# Patient Record
Sex: MALE | Race: OTHER | HISPANIC OR LATINO | Employment: FULL TIME | URBAN - METROPOLITAN AREA
[De-identification: names, ages, dates, MRNs, and addresses within clinical notes are randomized per-mention and may not be internally consistent; named-entity substitution may affect disease eponyms.]

---

## 2019-03-28 ENCOUNTER — EH NON-PROVIDER (OUTPATIENT)
Dept: OCCUPATIONAL MEDICINE | Facility: CLINIC | Age: 44
End: 2019-03-28

## 2019-03-28 ENCOUNTER — HOSPITAL ENCOUNTER (OUTPATIENT)
Facility: MEDICAL CENTER | Age: 44
End: 2019-03-28
Attending: FAMILY MEDICINE
Payer: COMMERCIAL

## 2019-03-28 ENCOUNTER — EMPLOYEE HEALTH (OUTPATIENT)
Dept: OCCUPATIONAL MEDICINE | Facility: CLINIC | Age: 44
End: 2019-03-28

## 2019-03-28 VITALS
SYSTOLIC BLOOD PRESSURE: 160 MMHG | OXYGEN SATURATION: 96 % | TEMPERATURE: 98.5 F | RESPIRATION RATE: 16 BRPM | BODY MASS INDEX: 34.07 KG/M2 | WEIGHT: 230 LBS | HEART RATE: 78 BPM | DIASTOLIC BLOOD PRESSURE: 96 MMHG | HEIGHT: 69 IN

## 2019-03-28 VITALS
DIASTOLIC BLOOD PRESSURE: 96 MMHG | RESPIRATION RATE: 16 BRPM | SYSTOLIC BLOOD PRESSURE: 160 MMHG | OXYGEN SATURATION: 96 % | HEART RATE: 78 BPM | BODY MASS INDEX: 34.07 KG/M2 | WEIGHT: 230 LBS | HEIGHT: 69 IN | TEMPERATURE: 98.5 F

## 2019-03-28 DIAGNOSIS — Z02.89 ENCOUNTER FOR OCCUPATIONAL HEALTH EXAMINATION: Primary | ICD-10-CM

## 2019-03-28 DIAGNOSIS — Z02.89 ENCOUNTER FOR OCCUPATIONAL HEALTH EXAMINATION: ICD-10-CM

## 2019-03-28 DIAGNOSIS — Z02.1 PRE-EMPLOYMENT DRUG SCREENING: ICD-10-CM

## 2019-03-28 LAB
AMP AMPHETAMINE: NORMAL
BAR BARBITURATES: NORMAL
BZO BENZODIAZEPINES: NORMAL
COC COCAINE: NORMAL
INT CON NEG: NORMAL
INT CON POS: NORMAL
MDMA ECSTASY: NORMAL
MET METHAMPHETAMINES: NORMAL
MTD METHADONE: NORMAL
OPI OPIATES: NORMAL
OXY OXYCODONE: NORMAL
PCP PHENCYCLIDINE: NORMAL
POC URINE DRUG SCREEN OCDRS: NEGATIVE
THC: NORMAL

## 2019-03-28 PROCEDURE — 8915 PR COMPREHENSIVE PHYSICAL: Performed by: FAMILY MEDICINE

## 2019-03-28 PROCEDURE — 90707 MMR VACCINE SC: CPT | Performed by: PREVENTIVE MEDICINE

## 2019-03-28 PROCEDURE — 80305 DRUG TEST PRSMV DIR OPT OBS: CPT | Performed by: FAMILY MEDICINE

## 2019-03-28 PROCEDURE — 86787 VARICELLA-ZOSTER ANTIBODY: CPT | Performed by: FAMILY MEDICINE

## 2019-03-28 PROCEDURE — 86480 TB TEST CELL IMMUN MEASURE: CPT | Performed by: FAMILY MEDICINE

## 2019-03-28 RX ORDER — ATORVASTATIN CALCIUM 40 MG/1
40 TABLET, FILM COATED ORAL NIGHTLY
COMMUNITY
End: 2019-07-11

## 2019-03-28 RX ORDER — LANSOPRAZOLE 30 MG/1
30 CAPSULE, DELAYED RELEASE ORAL DAILY
COMMUNITY
End: 2019-11-18 | Stop reason: SDUPTHER

## 2019-03-28 RX ORDER — FEXOFENADINE HCL 180 MG/1
180 TABLET ORAL DAILY
COMMUNITY

## 2019-03-28 RX ORDER — CLOPIDOGREL BISULFATE 75 MG/1
75 TABLET ORAL DAILY
COMMUNITY
End: 2019-11-18 | Stop reason: SDUPTHER

## 2019-03-28 NOTE — PROGRESS NOTES
See scanned preemployment history and physical examination.  BP elevated today and patient with past medical history of coronary artery stent.  He notes that blood pressure has been stable until today and will recheck on his own to follow-up with primary care if remains elevated

## 2019-03-29 LAB — VZV IGG SER IA-ACNC: 0.45

## 2019-03-30 LAB
GAMMA INTERFERON BACKGROUND BLD IA-ACNC: 0.08 IU/ML
M TB IFN-G BLD-IMP: NEGATIVE
M TB IFN-G CD4+ BCKGRND COR BLD-ACNC: -0.02 IU/ML
MITOGEN IGNF BCKGRD COR BLD-ACNC: >10 IU/ML
QFT TB2 - NIL TBQ2: -0.04 IU/ML

## 2019-04-08 ENCOUNTER — TELEPHONE (OUTPATIENT)
Dept: OCCUPATIONAL MEDICINE | Facility: CLINIC | Age: 44
End: 2019-04-08

## 2019-04-08 NOTE — TELEPHONE ENCOUNTER
Pt called to inform me that he will be receiving his first varicella vaccination on 4/9/19 in [place]. I provided the pt with my e-mail address in order to send me his proof of vaccination. Once I have received that, I will call to schedule his VZV #2 and clear him for work at [employer].

## 2019-04-17 ENCOUNTER — EH NON-PROVIDER (OUTPATIENT)
Dept: OCCUPATIONAL MEDICINE | Facility: CLINIC | Age: 44
End: 2019-04-17
Payer: COMMERCIAL

## 2019-04-17 DIAGNOSIS — Z71.85 IMMUNIZATION COUNSELING: ICD-10-CM

## 2019-04-17 NOTE — PROGRESS NOTES
Pre-employment medical surveillance reviewed and signed. Varicella vaccine series required. VZV #1 administered on 4/12/19. VZV #2 scheduled for 5/10/19. Quantiferon result documented in immunizations. Document returned to monthly assigned MA.

## 2019-05-10 ENCOUNTER — EH NON-PROVIDER (OUTPATIENT)
Dept: OCCUPATIONAL MEDICINE | Facility: CLINIC | Age: 44
End: 2019-05-10

## 2019-05-10 DIAGNOSIS — Z23 NEED FOR VARICELLA VACCINE: ICD-10-CM

## 2019-05-10 PROCEDURE — 90716 VAR VACCINE LIVE SUBQ: CPT | Performed by: PREVENTIVE MEDICINE

## 2019-05-16 ENCOUNTER — TELEPHONE (OUTPATIENT)
Dept: SCHEDULING | Facility: IMAGING CENTER | Age: 44
End: 2019-05-16

## 2019-06-25 ENCOUNTER — TELEPHONE (OUTPATIENT)
Dept: CARDIOLOGY | Facility: MEDICAL CENTER | Age: 44
End: 2019-06-25

## 2019-06-27 ENCOUNTER — TELEPHONE (OUTPATIENT)
Dept: CARDIOLOGY | Facility: MEDICAL CENTER | Age: 44
End: 2019-06-27

## 2019-06-27 NOTE — TELEPHONE ENCOUNTER
Received MR's from [facility] Records   Phone: [phone]  Fax: [phone]    Sent Records regional MR's to get scanned into chart.   Before apt. On 7/11/2019 with Dr. Power cardiologist

## 2019-07-06 ENCOUNTER — HOSPITAL ENCOUNTER (OUTPATIENT)
Dept: LAB | Facility: MEDICAL CENTER | Age: 44
End: 2019-07-06
Payer: COMMERCIAL

## 2019-07-06 LAB
BDY FAT % MEASURED: 25.4 %
BP DIAS: 71 MMHG
BP SYS: 142 MMHG
CHOLEST SERPL-MCNC: 140 MG/DL (ref 100–199)
DIABETES HTDIA: NO
EVENT NAME HTEVT: NORMAL
FASTING HTFAS: YES
GLUCOSE SERPL-MCNC: 117 MG/DL (ref 65–99)
HDLC SERPL-MCNC: 35 MG/DL
HYPERTENSION HTHYP: YES
LDLC SERPL CALC-MCNC: 83 MG/DL
SCREENING LOC CITY HTCIT: NORMAL
SCREENING LOC STATE HTSTA: NORMAL
SCREENING LOCATION HTLOC: NORMAL
SMOKING HTSMO: NO
SUBSCRIBER ID HTSID: NORMAL
TRIGL SERPL-MCNC: 108 MG/DL (ref 0–149)

## 2019-07-06 PROCEDURE — 36415 COLL VENOUS BLD VENIPUNCTURE: CPT

## 2019-07-06 PROCEDURE — 82947 ASSAY GLUCOSE BLOOD QUANT: CPT

## 2019-07-06 PROCEDURE — 80061 LIPID PANEL: CPT

## 2019-07-06 PROCEDURE — S5190 WELLNESS ASSESSMENT BY NONPH: HCPCS

## 2019-07-11 ENCOUNTER — OFFICE VISIT (OUTPATIENT)
Dept: CARDIOLOGY | Facility: MEDICAL CENTER | Age: 44
End: 2019-07-11
Payer: COMMERCIAL

## 2019-07-11 VITALS
HEART RATE: 76 BPM | SYSTOLIC BLOOD PRESSURE: 148 MMHG | WEIGHT: 235 LBS | HEIGHT: 69 IN | DIASTOLIC BLOOD PRESSURE: 84 MMHG | BODY MASS INDEX: 34.8 KG/M2 | OXYGEN SATURATION: 95 %

## 2019-07-11 DIAGNOSIS — I25.10 CORONARY ARTERY DISEASE INVOLVING NATIVE CORONARY ARTERY OF NATIVE HEART WITHOUT ANGINA PECTORIS: ICD-10-CM

## 2019-07-11 DIAGNOSIS — E78.00 PURE HYPERCHOLESTEROLEMIA: ICD-10-CM

## 2019-07-11 DIAGNOSIS — R03.0 PRE-HYPERTENSION: ICD-10-CM

## 2019-07-11 DIAGNOSIS — E78.6 LOW HDL (UNDER 40): ICD-10-CM

## 2019-07-11 PROCEDURE — 99204 OFFICE O/P NEW MOD 45 MIN: CPT | Performed by: INTERNAL MEDICINE

## 2019-07-11 RX ORDER — ATORVASTATIN CALCIUM 80 MG/1
80 TABLET, FILM COATED ORAL EVERY EVENING
Qty: 90 TAB | Refills: 3 | Status: SHIPPED | OUTPATIENT
Start: 2019-07-11 | End: 2019-11-18 | Stop reason: SDUPTHER

## 2019-07-11 ASSESSMENT — ENCOUNTER SYMPTOMS
FEVER: 0
CHILLS: 0
DIZZINESS: 1
PALPITATIONS: 0
BRUISES/BLEEDS EASILY: 0
DEPRESSION: 0
PND: 0
EYE DISCHARGE: 0
MYALGIAS: 0
COUGH: 0
HEADACHES: 0
HEARTBURN: 0
NERVOUS/ANXIOUS: 0
SHORTNESS OF BREATH: 0
NAUSEA: 0
BLURRED VISION: 0

## 2019-07-11 NOTE — PROGRESS NOTES
Chief Complaint   Patient presents with   • Coronary Artery Disease       Subjective:   Giuseppe Bhandari is a 43 y.o. male who presents today self-referred for follow-up of coronary artery    The patient had significant chest discomfort on before and on October 18, 2018.  He had mild elevation of troponins.  Coronary arteriography demonstrated a very tight stenosis of LAD that was treated with a 4.5 diameter drug-eluting stent.  There is minimal disease elsewhere.  Echocardiogram demonstrated a normal left ventricular systolic function with an LVEF of 60.  His pretreatment LDL was 131.  HDL tends to be 30.    His only complaint now is that he gets orthostatic lightheadedness and has a sense of soreness in the left parasternal region as mentioned by his cardiologist before he moved here.  He was in [place] at the time of the small non-ST elevation myocardial infarction.    He is doing heavy weightlifting.  He has no other specific complaints today.    Most recent blood pressure during health screening was 142 systolic.  Some home blood pressures in the last 6 months were discussed.  About a third of the blood pressures are greater than 130 systolic.  Most recent lipid panel demonstrates an LDL of 83 and a HDL of 30.  Triglycerides normal.    Family history: Father's history unknown.  Mother has recently diagnosed diabetes mellitus.  No coronary artery disease.    Social history: He has a girlfriend.  He is post divorce.  He has 3 daughters in town.  No tobacco ever.  No alcohol abuse.  No illicit drugs.    Records from [place] reviewed in detail.    His last EKG in December 2018 was normal.    History reviewed. No pertinent past medical history.  History reviewed. No pertinent surgical history.  History reviewed. No pertinent family history.  Social History     Social History   • Marital status: Single     Spouse name: N/A   • Number of children: N/A   • Years of education: N/A     Occupational History   • Not on  file.     Social History Main Topics   • Smoking status: Never Smoker   • Smokeless tobacco: Never Used   • Alcohol use Yes      Comment: occasional   • Drug use: No   • Sexual activity: Not on file     Other Topics Concern   • Not on file     Social History Narrative   • No narrative on file     Allergies   Allergen Reactions   • Iodine Rash and Itching     Pt. States he broke out from IV contrast    • Latex Swelling     Pt. States he also gets blisters     Outpatient Encounter Prescriptions as of 7/11/2019   Medication Sig Dispense Refill   • Multiple Vitamins-Minerals (MULTIVITAMIN ADULT PO) Take  by mouth.     • atorvastatin (LIPITOR) 80 MG tablet Take 1 Tab by mouth every evening. 90 Tab 3   • clopidogrel (PLAVIX) 75 MG Tab Take 75 mg by mouth every day.     • ADULT ASPIRIN LOW STRENGTH PO Take 81 mg by mouth.     • lansoprazole (PREVACID) 30 MG CAPSULE DELAYED RELEASE Take 30 mg by mouth every day.     • fexofenadine (ALLEGRA) 180 MG tablet Take 180 mg by mouth every day.     • [DISCONTINUED] atorvastatin (LIPITOR) 40 MG Tab Take 40 mg by mouth every evening.       No facility-administered encounter medications on file as of 7/11/2019.      Review of Systems   Constitutional: Negative for chills, fever and malaise/fatigue.   Eyes: Negative for blurred vision and discharge.   Respiratory: Negative for cough and shortness of breath.    Cardiovascular: Positive for chest pain. Negative for palpitations, leg swelling and PND.   Gastrointestinal: Negative for heartburn and nausea.   Genitourinary: Negative for dysuria and urgency.   Musculoskeletal: Negative for myalgias.   Skin: Negative for itching and rash.   Neurological: Positive for dizziness. Negative for headaches.   Endo/Heme/Allergies: Negative for environmental allergies. Does not bruise/bleed easily.   Psychiatric/Behavioral: Negative for depression. The patient is not nervous/anxious.         Objective:   /84 (BP Location: Left arm, Patient  "Position: Sitting, BP Cuff Size: Large adult)   Pulse 76   Ht 1.753 m (5' 9\")   Wt 106.6 kg (235 lb)   SpO2 95%   BMI 34.70 kg/m²     Physical Exam   Constitutional: He is oriented to person, place, and time.   Pleasant man    Neck: No JVD present.   Cardiovascular: Normal rate and regular rhythm.  Exam reveals no gallop and no friction rub.    No murmur heard.  Abdominal: Soft. There is no tenderness.   Musculoskeletal: He exhibits no edema.   Neurological: He is alert and oriented to person, place, and time.   Skin: Skin is warm and dry.       Assessment:     1. Coronary artery disease involving native coronary artery of native heart without angina pectoris     2. Pure hypercholesterolemia  Comp Metabolic Panel    LIPID PANEL    HEMOGLOBIN A1C (Glycohemoglobin GHB Total/A1C with MBG Estimate)   3. Low HDL (under 40)  Comp Metabolic Panel    LIPID PANEL    HEMOGLOBIN A1C (Glycohemoglobin GHB Total/A1C with MBG Estimate)   4. Pre-hypertension         Medical Decision Making:  Today's Assessment / Status / Plan:   Chronic complaint of orthostatic hypotension noted.  Recommend increase fluid intake.  Chronic complaint of left chest soreness noncardiac in etiology and may be related to his weight lifting program.  With respect to the possibility of hypertension, home blood pressures over the next month and return here with blood pressure record and home blood pressure device.  Increase Lipitor to 80 mg/day.  Lab work and return in 8 weeks.  Include glycohemoglobin because of a recent glucose of 117 and may have been fasting.  Follow-up with cardiologist in October November.  "

## 2019-08-16 ENCOUNTER — OFFICE VISIT (OUTPATIENT)
Dept: MEDICAL GROUP | Facility: MEDICAL CENTER | Age: 44
End: 2019-08-16
Payer: COMMERCIAL

## 2019-08-16 VITALS
SYSTOLIC BLOOD PRESSURE: 134 MMHG | WEIGHT: 238 LBS | OXYGEN SATURATION: 95 % | DIASTOLIC BLOOD PRESSURE: 78 MMHG | HEART RATE: 65 BPM | TEMPERATURE: 97.7 F | HEIGHT: 69 IN | BODY MASS INDEX: 35.25 KG/M2

## 2019-08-16 DIAGNOSIS — E78.00 PURE HYPERCHOLESTEROLEMIA: ICD-10-CM

## 2019-08-16 DIAGNOSIS — I25.10 CORONARY ARTERY DISEASE INVOLVING NATIVE CORONARY ARTERY OF NATIVE HEART WITHOUT ANGINA PECTORIS: ICD-10-CM

## 2019-08-16 DIAGNOSIS — E78.6 LOW HDL (UNDER 40): ICD-10-CM

## 2019-08-16 DIAGNOSIS — Z00.00 ANNUAL PHYSICAL EXAM: ICD-10-CM

## 2019-08-16 DIAGNOSIS — R73.01 ELEVATED FASTING BLOOD SUGAR: ICD-10-CM

## 2019-08-16 DIAGNOSIS — E66.9 OBESITY (BMI 30-39.9): ICD-10-CM

## 2019-08-16 DIAGNOSIS — K21.9 GASTROESOPHAGEAL REFLUX DISEASE, ESOPHAGITIS PRESENCE NOT SPECIFIED: ICD-10-CM

## 2019-08-16 PROCEDURE — 99386 PREV VISIT NEW AGE 40-64: CPT | Performed by: FAMILY MEDICINE

## 2019-08-16 SDOH — HEALTH STABILITY: MENTAL HEALTH: HOW MANY STANDARD DRINKS CONTAINING ALCOHOL DO YOU HAVE ON A TYPICAL DAY?: 1 OR 2

## 2019-08-16 SDOH — HEALTH STABILITY: MENTAL HEALTH: HOW OFTEN DO YOU HAVE 6 OR MORE DRINKS ON ONE OCCASION?: NEVER

## 2019-08-16 SDOH — HEALTH STABILITY: MENTAL HEALTH: HOW OFTEN DO YOU HAVE A DRINK CONTAINING ALCOHOL?: MONTHLY OR LESS

## 2019-08-16 ASSESSMENT — PATIENT HEALTH QUESTIONNAIRE - PHQ9: CLINICAL INTERPRETATION OF PHQ2 SCORE: 0

## 2019-08-16 NOTE — ASSESSMENT & PLAN NOTE
Diagnosed with GERD since age 19. Prevacid 30 mg daily is the only one that works well, has tried Nexium, Zantac, Prilosec without good results.

## 2019-08-16 NOTE — PROGRESS NOTES
Giuseppe Bhandari is a 43 y.o. male here for annual  HPI: Patient just moved from [place] and just  from his wife, going through divorce.    Patient had proximal LAD stent in October 2018, he was told that he had 99% stenosis at the time of stent. No known family history of heart disease but he does not know his father. He used to be morbidly obese, but has been exercising and eating healthier to lose weight. Being followed by cardiology and Atorvastatin was increased 1 month ago from 40 mg daily to now 80 mg daily.  He is also on Plavix and aspirin.    GERD (gastroesophageal reflux disease)  Diagnosed with GERD since age 19. Prevacid 30 mg daily is the only one that works well, has tried Nexium, Zantac, Prilosec without good results.    Current medicines (including changes today)  Current Outpatient Medications   Medication Sig Dispense Refill   • Multiple Vitamins-Minerals (MULTIVITAMIN ADULT PO) Take  by mouth.     • atorvastatin (LIPITOR) 80 MG tablet Take 1 Tab by mouth every evening. 90 Tab 3   • clopidogrel (PLAVIX) 75 MG Tab Take 75 mg by mouth every day.     • ADULT ASPIRIN LOW STRENGTH PO Take 81 mg by mouth.     • lansoprazole (PREVACID) 30 MG CAPSULE DELAYED RELEASE Take 30 mg by mouth every day.     • fexofenadine (ALLEGRA) 180 MG tablet Take 180 mg by mouth every day.       No current facility-administered medications for this visit.      He  has no past medical history on file.  He  has a past surgical history that includes lumbar fusion posterior (2004) and vasectomy (2007).  Social History     Tobacco Use   • Smoking status: Never Smoker   • Smokeless tobacco: Never Used   Substance Use Topics   • Alcohol use: Yes     Frequency: Monthly or less     Drinks per session: 1 or 2     Binge frequency: Never     Comment: occasional   • Drug use: No     Social History     Social History Narrative   • Not on file     Family History   Problem Relation Age of Onset   • Diabetes Mother      Family  "Status   Relation Name Status   • Mo  Alive   • Fa  Other        Unknown to patient   • Sis  Alive   • Bro     • Child  Alive   • Child  Alive   • Child  Alive   • Child  Alive   • Child  Alive         ROS  No chest pain.  All other systems reviewed and are negative     Objective:     /78 (BP Location: Right arm, Patient Position: Sitting)   Pulse 65   Temp 36.5 °C (97.7 °F)   Ht 1.753 m (5' 9\")   Wt 108 kg (238 lb)   SpO2 95%  Body mass index is 35.15 kg/m².  Physical Exam:    Constitutional: Alert, no distress.  Skin: Warm, dry, good turgor, no rashes in visible areas.  Eye: Equal, round and reactive, conjunctiva clear, lids normal.  ENMT: Lips without lesions, good dentition, oropharynx clear.  TMs pearly gray bilaterally.  Neck: Trachea midline, no masses, no thyromegaly. No cervical or supraclavicular lymphadenopathy.  Respiratory: Unlabored respiratory effort, lungs clear to auscultation, no wheezes, no ronchi.  Cardiovascular: Normal S1, S2, no murmur, no edema.  Abdomen: Soft, non-tender, no masses, no hepatosplenomegaly.  Psych: Alert and oriented x3, normal affect and mood.        Assessment and Plan:   The following treatment plan was discussed    1. Annual physical exam  Advised healthy lifestyle.  Check labs and call with results.  - Comp Metabolic Panel; Future  - HEMOGLOBIN A1C; Future  - CBC WITH DIFFERENTIAL; Future  - Lipid Profile; Future  - TSH WITH REFLEX TO FT4; Future    2. Coronary artery disease involving native coronary artery of native heart without angina pectoris  Strongly encouraged healthy lifestyle and to be compliant with medication.  Check labs and call with results.  Follow-up with cardiology as scheduled.    3. Low HDL (under 40)  Strongly encouraged healthy lifestyle and to be compliant with medication.  Check labs and call with results    4. Pure hypercholesterolemia  Strongly encouraged healthy lifestyle and to be compliant with medication.  Check labs and " call with results    5. Obesity (BMI 30-39.9)  - Patient identified as having weight management issue.  Appropriate orders and counseling given.    6. Elevated fasting blood sugar  Check A1c and call with results.    7. Gastroesophageal reflux disease, esophagitis presence not specified  Controlled.  Continue Prevacid.      Records requested.  Followup: Return in about 1 year (around 8/16/2020) for Annual.

## 2019-09-07 ENCOUNTER — HOSPITAL ENCOUNTER (OUTPATIENT)
Dept: LAB | Facility: MEDICAL CENTER | Age: 44
End: 2019-09-07
Attending: FAMILY MEDICINE
Payer: COMMERCIAL

## 2019-09-07 DIAGNOSIS — Z00.00 ANNUAL PHYSICAL EXAM: ICD-10-CM

## 2019-09-07 LAB
ALBUMIN SERPL BCP-MCNC: 4.6 G/DL (ref 3.2–4.9)
ALBUMIN/GLOB SERPL: 1.6 G/DL
ALP SERPL-CCNC: 74 U/L (ref 30–99)
ALT SERPL-CCNC: 49 U/L (ref 2–50)
ANION GAP SERPL CALC-SCNC: 11 MMOL/L (ref 0–11.9)
AST SERPL-CCNC: 38 U/L (ref 12–45)
BASOPHILS # BLD AUTO: 0.7 % (ref 0–1.8)
BASOPHILS # BLD: 0.06 K/UL (ref 0–0.12)
BILIRUB SERPL-MCNC: 0.8 MG/DL (ref 0.1–1.5)
BUN SERPL-MCNC: 12 MG/DL (ref 8–22)
CALCIUM SERPL-MCNC: 9.5 MG/DL (ref 8.5–10.5)
CHLORIDE SERPL-SCNC: 104 MMOL/L (ref 96–112)
CHOLEST SERPL-MCNC: 122 MG/DL (ref 100–199)
CO2 SERPL-SCNC: 26 MMOL/L (ref 20–33)
CREAT SERPL-MCNC: 0.98 MG/DL (ref 0.5–1.4)
EOSINOPHIL # BLD AUTO: 0.07 K/UL (ref 0–0.51)
EOSINOPHIL NFR BLD: 0.8 % (ref 0–6.9)
ERYTHROCYTE [DISTWIDTH] IN BLOOD BY AUTOMATED COUNT: 40.9 FL (ref 35.9–50)
EST. AVERAGE GLUCOSE BLD GHB EST-MCNC: 128 MG/DL
GLOBULIN SER CALC-MCNC: 2.8 G/DL (ref 1.9–3.5)
GLUCOSE SERPL-MCNC: 108 MG/DL (ref 65–99)
HBA1C MFR BLD: 6.1 % (ref 0–5.6)
HCT VFR BLD AUTO: 48 % (ref 42–52)
HDLC SERPL-MCNC: 39 MG/DL
HGB BLD-MCNC: 15.4 G/DL (ref 14–18)
IMM GRANULOCYTES # BLD AUTO: 0.03 K/UL (ref 0–0.11)
IMM GRANULOCYTES NFR BLD AUTO: 0.3 % (ref 0–0.9)
LDLC SERPL CALC-MCNC: 60 MG/DL
LYMPHOCYTES # BLD AUTO: 1.8 K/UL (ref 1–4.8)
LYMPHOCYTES NFR BLD: 20.5 % (ref 22–41)
MCH RBC QN AUTO: 28.7 PG (ref 27–33)
MCHC RBC AUTO-ENTMCNC: 32.1 G/DL (ref 33.7–35.3)
MCV RBC AUTO: 89.6 FL (ref 81.4–97.8)
MONOCYTES # BLD AUTO: 0.64 K/UL (ref 0–0.85)
MONOCYTES NFR BLD AUTO: 7.3 % (ref 0–13.4)
NEUTROPHILS # BLD AUTO: 6.17 K/UL (ref 1.82–7.42)
NEUTROPHILS NFR BLD: 70.4 % (ref 44–72)
NRBC # BLD AUTO: 0 K/UL
NRBC BLD-RTO: 0 /100 WBC
PLATELET # BLD AUTO: 217 K/UL (ref 164–446)
PMV BLD AUTO: 10.5 FL (ref 9–12.9)
POTASSIUM SERPL-SCNC: 4.1 MMOL/L (ref 3.6–5.5)
PROT SERPL-MCNC: 7.4 G/DL (ref 6–8.2)
RBC # BLD AUTO: 5.36 M/UL (ref 4.7–6.1)
SODIUM SERPL-SCNC: 141 MMOL/L (ref 135–145)
T4 FREE SERPL-MCNC: 0.83 NG/DL (ref 0.53–1.43)
TRIGL SERPL-MCNC: 116 MG/DL (ref 0–149)
TSH SERPL DL<=0.005 MIU/L-ACNC: 5.54 UIU/ML (ref 0.38–5.33)
WBC # BLD AUTO: 8.8 K/UL (ref 4.8–10.8)

## 2019-09-07 PROCEDURE — 80053 COMPREHEN METABOLIC PANEL: CPT

## 2019-09-07 PROCEDURE — 36415 COLL VENOUS BLD VENIPUNCTURE: CPT

## 2019-09-07 PROCEDURE — 80061 LIPID PANEL: CPT

## 2019-09-07 PROCEDURE — 84443 ASSAY THYROID STIM HORMONE: CPT

## 2019-09-07 PROCEDURE — 83036 HEMOGLOBIN GLYCOSYLATED A1C: CPT

## 2019-09-07 PROCEDURE — 84439 ASSAY OF FREE THYROXINE: CPT

## 2019-09-07 PROCEDURE — 85025 COMPLETE CBC W/AUTO DIFF WBC: CPT

## 2019-09-09 ENCOUNTER — TELEPHONE (OUTPATIENT)
Dept: MEDICAL GROUP | Facility: MEDICAL CENTER | Age: 44
End: 2019-09-09

## 2019-09-09 NOTE — TELEPHONE ENCOUNTER
----- Message from Mathieu Mccracken M.D. sent at 9/9/2019  7:33 AM PDT -----  Please have patient schedule appointment to discuss recent lab results. Not urgent or critical.  He is prediabetic and his thyroid function is borderline low functioning.  Mathieu Mccracken MD

## 2019-09-11 ENCOUNTER — OFFICE VISIT (OUTPATIENT)
Dept: MEDICAL GROUP | Facility: MEDICAL CENTER | Age: 44
End: 2019-09-11
Payer: COMMERCIAL

## 2019-09-11 VITALS
HEART RATE: 66 BPM | BODY MASS INDEX: 35.25 KG/M2 | WEIGHT: 238 LBS | OXYGEN SATURATION: 93 % | TEMPERATURE: 97.7 F | SYSTOLIC BLOOD PRESSURE: 134 MMHG | DIASTOLIC BLOOD PRESSURE: 80 MMHG | HEIGHT: 69 IN

## 2019-09-11 DIAGNOSIS — R73.03 PREDIABETES: ICD-10-CM

## 2019-09-11 DIAGNOSIS — E03.8 SUBCLINICAL HYPOTHYROIDISM: ICD-10-CM

## 2019-09-11 DIAGNOSIS — E78.00 PURE HYPERCHOLESTEROLEMIA: ICD-10-CM

## 2019-09-11 PROCEDURE — 99214 OFFICE O/P EST MOD 30 MIN: CPT | Performed by: FAMILY MEDICINE

## 2019-09-11 NOTE — ASSESSMENT & PLAN NOTE
Has never had thyroid problems in the past with previous test. Was doing a lot of working out, decreased food intake and energy drinks 1-2 weeks before labs.  No hypothyroid symptoms.    Results for SHELLY DUGAN (MRN [id number]) as of 9/11/2019 08:20   Ref. Range 9/7/2019 09:58   TSH Latest Ref Range: 0.380 - 5.330 uIU/mL 5.540 (H)   Free T-4 Latest Ref Range: 0.53 - 1.43 ng/dL 0.83

## 2019-09-11 NOTE — ASSESSMENT & PLAN NOTE
A1c of 6.1. He is exercising regularly. He has tried to increase vegetables.  Was eating out a lot because he did not have his house for the last 3 months, now eating much healthier.

## 2019-09-11 NOTE — PROGRESS NOTES
Subjective:   Giuseppe Dugan is a 43 y.o. male here today for prediabetes, hypercholesterolemia, abnormal thyroid labs.    Subclinical hypothyroidism  Has never had thyroid problems in the past with previous test. Was doing a lot of working out, decreased food intake and energy drinks 1-2 weeks before labs.  No hypothyroid symptoms.    Results for GIUSEPPE DUGAN (MRN [id number]) as of 9/11/2019 08:20   Ref. Range 9/7/2019 09:58   TSH Latest Ref Range: 0.380 - 5.330 uIU/mL 5.540 (H)   Free T-4 Latest Ref Range: 0.53 - 1.43 ng/dL 0.83       Prediabetes  A1c of 6.1. He is exercising regularly. He has tried to increase vegetables.  Was eating out a lot because he did not have his house for the last 3 months, now eating much healthier.    Pure hypercholesterolemia  Tolerating atorvastatin 80 mg daily, increased from 40 mg daily 2 months ago.    Results for GIUSEPPE DUGAN (MRN [id number]) as of 9/11/2019 08:20   Ref. Range 7/6/2019 07:39 9/7/2019 09:58   Cholesterol,Tot Latest Ref Range: 100 - 199 mg/dL 140 122   Triglycerides Latest Ref Range: 0 - 149 mg/dL 108 116   HDL Latest Ref Range: >=40 mg/dL 35 (A) 39 (A)   LDL Latest Ref Range: <100 mg/dL 83 60          Current medicines (including changes today)  Current Outpatient Medications   Medication Sig Dispense Refill   • Multiple Vitamins-Minerals (MULTIVITAMIN ADULT PO) Take  by mouth.     • atorvastatin (LIPITOR) 80 MG tablet Take 1 Tab by mouth every evening. 90 Tab 3   • clopidogrel (PLAVIX) 75 MG Tab Take 75 mg by mouth every day.     • ADULT ASPIRIN LOW STRENGTH PO Take 81 mg by mouth.     • lansoprazole (PREVACID) 30 MG CAPSULE DELAYED RELEASE Take 30 mg by mouth every day.     • fexofenadine (ALLEGRA) 180 MG tablet Take 180 mg by mouth every day.       No current facility-administered medications for this visit.      He  has no past medical history on file.    ROS   No chest pain, no shortness of breath       Objective:     /80 (BP Location:  "Right arm, Patient Position: Sitting)   Pulse 66   Temp 36.5 °C (97.7 °F)   Ht 1.753 m (5' 9\")   Wt 108 kg (238 lb)   SpO2 93%  Body mass index is 35.15 kg/m².   Physical Exam:  Constitutional: Alert, no distress.  Skin: Warm, dry, good turgor, no rashes in visible areas.  Eye: Equal, round and reactive, conjunctiva clear, lids normal.  Psych: Alert and oriented x3, normal affect and mood.        Assessment and Plan:   The following treatment plan was discussed    1. Subclinical hypothyroidism  Asymptomatic. Continue to monitor.    2. Prediabetes  Encourage patient continue on track with his healthy diet and regular exercise.  Follow up labs in 1 year for annual.    3. Pure hypercholesterolemia  Controlled LDL.  Continue exercising to try to improve HDL.  Continue atorvastatin 80 mg daily.      Followup: Return if symptoms worsen or fail to improve.         "

## 2019-09-11 NOTE — ASSESSMENT & PLAN NOTE
Tolerating atorvastatin 80 mg daily, increased from 40 mg daily 2 months ago.    Results for RITCHIE SHELLY STEELE (MRN [id number]) as of 9/11/2019 08:20   Ref. Range 7/6/2019 07:39 9/7/2019 09:58   Cholesterol,Tot Latest Ref Range: 100 - 199 mg/dL 140 122   Triglycerides Latest Ref Range: 0 - 149 mg/dL 108 116   HDL Latest Ref Range: >=40 mg/dL 35 (A) 39 (A)   LDL Latest Ref Range: <100 mg/dL 83 60

## 2019-09-19 ENCOUNTER — OFFICE VISIT (OUTPATIENT)
Dept: CARDIOLOGY | Facility: MEDICAL CENTER | Age: 44
End: 2019-09-19
Payer: COMMERCIAL

## 2019-09-19 VITALS
WEIGHT: 239 LBS | HEIGHT: 69 IN | DIASTOLIC BLOOD PRESSURE: 72 MMHG | BODY MASS INDEX: 35.4 KG/M2 | HEART RATE: 86 BPM | OXYGEN SATURATION: 96 % | SYSTOLIC BLOOD PRESSURE: 142 MMHG

## 2019-09-19 DIAGNOSIS — I20.89 STABLE ANGINA PECTORIS: ICD-10-CM

## 2019-09-19 DIAGNOSIS — I25.10 CORONARY ARTERY DISEASE INVOLVING NATIVE CORONARY ARTERY OF NATIVE HEART WITHOUT ANGINA PECTORIS: ICD-10-CM

## 2019-09-19 DIAGNOSIS — E78.6 LOW HDL (UNDER 40): ICD-10-CM

## 2019-09-19 DIAGNOSIS — E78.00 PURE HYPERCHOLESTEROLEMIA: ICD-10-CM

## 2019-09-19 DIAGNOSIS — R03.0 PRE-HYPERTENSION: ICD-10-CM

## 2019-09-19 PROCEDURE — 99214 OFFICE O/P EST MOD 30 MIN: CPT | Performed by: NURSE PRACTITIONER

## 2019-09-19 ASSESSMENT — ENCOUNTER SYMPTOMS
CHILLS: 0
DIZZINESS: 0
ORTHOPNEA: 0
HEADACHES: 0
COUGH: 0
SHORTNESS OF BREATH: 0
SPUTUM PRODUCTION: 0
NAUSEA: 0
PALPITATIONS: 0
FEVER: 0
HEMOPTYSIS: 0
PND: 0
CLAUDICATION: 0
WHEEZING: 0
VOMITING: 0

## 2019-09-19 NOTE — PROGRESS NOTES
No chief complaint on file.      Subjective:   Giuseppe Bhandari is a 44 y.o. male who presents today for CAD.  Patient had NSTEMI in which a 4.5 x 18 mm resolute Angelo GARRET was placed to the pLAD.  It was noted there was minimal disease elsewhere.  Echocardiogram showed normal LV function with EF 60%.  Patient's LDL was 131 and HDL 30 prior to treatment.    Recent labs indicate CBC relatively within normal limits, TSH 5.540, A1c 6.1, CMP within normal limits except for glucose of 108, and lipid panel indicates , , HDL 39, LDL 60.    Patient is compliant with atorvastatin 80 mg every evening, clopidogrel 75 mg daily, ASA 81 mg daily.    Patient reports blood pressures ranging from 117-140s/60s-80s.  He states he was not placed on a beta-blocker at the time of his stent placement due to lower blood pressures.    Patient reports having a sharp pain that comes on occasionally at his shoulder that goes down his left arm to his elbow and also radiates down his back to just below his scapula.  He lives weights on a regular basis and has had some massage therapy, which she states helps a little.  This pain is been going on for 1 year.  He is in the middle of a divorce and has a great deal of stress in his life currently.  Will obtain MRA of the chest to look for aortic dissection.      No past medical history on file.  Past Surgical History:   Procedure Laterality Date   • VASECTOMY  2007   • LUMBAR FUSION POSTERIOR  2004    L4-S1     Family History   Problem Relation Age of Onset   • Diabetes Mother      Social History     Socioeconomic History   • Marital status:      Spouse name: Not on file   • Number of children: Not on file   • Years of education: Not on file   • Highest education level: Not on file   Occupational History   • Not on file   Social Needs   • Financial resource strain: Not on file   • Food insecurity:     Worry: Not on file     Inability: Not on file   • Transportation needs:      Medical: Not on file     Non-medical: Not on file   Tobacco Use   • Smoking status: Never Smoker   • Smokeless tobacco: Never Used   Substance and Sexual Activity   • Alcohol use: Yes     Frequency: Monthly or less     Drinks per session: 1 or 2     Binge frequency: Never     Comment: occasional   • Drug use: No   • Sexual activity: Not Currently     Partners: Female   Lifestyle   • Physical activity:     Days per week: Not on file     Minutes per session: Not on file   • Stress: Not on file   Relationships   • Social connections:     Talks on phone: Not on file     Gets together: Not on file     Attends Synagogue service: Not on file     Active member of club or organization: Not on file     Attends meetings of clubs or organizations: Not on file     Relationship status: Not on file   • Intimate partner violence:     Fear of current or ex partner: Not on file     Emotionally abused: Not on file     Physically abused: Not on file     Forced sexual activity: Not on file   Other Topics Concern   • Not on file   Social History Narrative   • Not on file     Allergies   Allergen Reactions   • Iodine Rash and Itching     Pt. States he broke out from IV contrast    • Latex Swelling     Pt. States he also gets blisters   • Pineapple    • Shellfish Allergy      Outpatient Encounter Medications as of 9/19/2019   Medication Sig Dispense Refill   • Multiple Vitamins-Minerals (MULTIVITAMIN ADULT PO) Take  by mouth.     • atorvastatin (LIPITOR) 80 MG tablet Take 1 Tab by mouth every evening. 90 Tab 3   • clopidogrel (PLAVIX) 75 MG Tab Take 75 mg by mouth every day.     • ADULT ASPIRIN LOW STRENGTH PO Take 81 mg by mouth.     • lansoprazole (PREVACID) 30 MG CAPSULE DELAYED RELEASE Take 30 mg by mouth every day.     • fexofenadine (ALLEGRA) 180 MG tablet Take 180 mg by mouth every day.       No facility-administered encounter medications on file as of 9/19/2019.      Review of Systems   Constitutional: Negative for chills and  fever.   Respiratory: Negative for cough, hemoptysis, sputum production, shortness of breath and wheezing.    Cardiovascular: Negative for chest pain, palpitations, orthopnea, claudication, leg swelling and PND.   Gastrointestinal: Negative for nausea and vomiting.   Neurological: Negative for dizziness and headaches.   All other systems reviewed and are negative.       Objective:   There were no vitals taken for this visit.    Physical Exam   Constitutional: He appears well-developed and well-nourished.   Eyes: EOM are normal.   Neck: Neck supple. No JVD present.   Cardiovascular: Normal rate, regular rhythm and normal heart sounds.   No murmur heard.  Pulmonary/Chest: Effort normal and breath sounds normal.   Abdominal: Soft.   Neurological:   CN II-XII grossly intact   Skin: Skin is warm and dry.   Psychiatric: He has a normal mood and affect. His behavior is normal. Judgment and thought content normal.   Nursing note and vitals reviewed.      Assessment:     1. Coronary artery disease involving native coronary artery of native heart without angina pectoris     2. Low HDL (under 40)     3. Pure hypercholesterolemia     4. Pre-hypertension         Medical Decision Making:  Today's Assessment / Status / Plan:   1. CAD  -Continue ASA 81 mg daily, atorvastatin 80 mg every evening, clopidogrel 75 mg daily  - Patient having shoulder and left arm pain will obtain MRA to rule out dissection    2.  Low HDL  -Most recent HDL 39  -On max atorvastatin    3.  Pure hypercholesterolemia  -Continue ASA and atorvastatin    4.  Pre-hypertension  - Patient is to obtain blood pressure cuff and record BP and HR    Collaborating MD is Dr. Gee.  Follow-up visit with Dr. Gee.  In October as already scheduled    Please note that this dictation was created using voice recognition software.  I have made every reasonable attempt to correct obvious errors, but it is possible there are errors of grammar or possibly content that I  did not discover before finalizing the note.

## 2019-10-15 ENCOUNTER — HOSPITAL ENCOUNTER (OUTPATIENT)
Dept: RADIOLOGY | Facility: MEDICAL CENTER | Age: 44
End: 2019-10-15
Attending: NURSE PRACTITIONER
Payer: COMMERCIAL

## 2019-10-15 DIAGNOSIS — I20.89 STABLE ANGINA PECTORIS: ICD-10-CM

## 2019-10-15 PROCEDURE — 700117 HCHG RX CONTRAST REV CODE 255: Performed by: NURSE PRACTITIONER

## 2019-10-15 PROCEDURE — A9576 INJ PROHANCE MULTIPACK: HCPCS | Performed by: NURSE PRACTITIONER

## 2019-10-15 PROCEDURE — C8911 MRA W/O FOL W/CONT, CHEST: HCPCS

## 2019-10-15 RX ADMIN — GADOTERIDOL 15 ML: 279.3 INJECTION, SOLUTION INTRAVENOUS at 15:33

## 2019-10-15 RX ADMIN — GADOTERIDOL 7 ML: 279.3 INJECTION, SOLUTION INTRAVENOUS at 15:34

## 2019-11-01 ENCOUNTER — TELEPHONE (OUTPATIENT)
Dept: CARDIOLOGY | Facility: MEDICAL CENTER | Age: 44
End: 2019-11-01

## 2019-11-01 NOTE — TELEPHONE ENCOUNTER
Called and left message on 11/01/2019 for patient to remind him that he has an upcoming appointment with Jovanna Gee on 11/18/2019 at 9:00AM and he needs to do his labs before his appointment.

## 2019-11-08 ENCOUNTER — NON-PROVIDER VISIT (OUTPATIENT)
Dept: URGENT CARE | Facility: PHYSICIAN GROUP | Age: 44
End: 2019-11-08

## 2019-11-08 DIAGNOSIS — Z23 FLU VACCINE NEED: Primary | ICD-10-CM

## 2019-11-08 PROCEDURE — 90686 IIV4 VACC NO PRSV 0.5 ML IM: CPT | Performed by: NURSE PRACTITIONER

## 2019-11-09 NOTE — NON-PROVIDER
"Giuseppe Bhandari is a 44 y.o. male here for a non-provider visit for:   FLU    Reason for immunization: Annual Flu Vaccine  Immunization records indicate need for vaccine: Yes, confirmed with Epic  Minimum interval has been met for this vaccine: Yes  ABN completed: Not Indicated    Order and dose verified by: Monica ROMERO Dated  8/15/19 was given to patient: Yes  All IAC Questionnaire questions were answered \"No.\"    Patient tolerated injection and no adverse effects were observed or reported: Yes    Pt scheduled for next dose in series: Not Indicated     "

## 2019-11-17 ENCOUNTER — PATIENT MESSAGE (OUTPATIENT)
Dept: MEDICAL GROUP | Facility: MEDICAL CENTER | Age: 44
End: 2019-11-17

## 2019-11-18 ENCOUNTER — OFFICE VISIT (OUTPATIENT)
Dept: CARDIOLOGY | Facility: MEDICAL CENTER | Age: 44
End: 2019-11-18
Payer: COMMERCIAL

## 2019-11-18 VITALS
HEART RATE: 70 BPM | DIASTOLIC BLOOD PRESSURE: 80 MMHG | OXYGEN SATURATION: 96 % | BODY MASS INDEX: 35.84 KG/M2 | SYSTOLIC BLOOD PRESSURE: 124 MMHG | WEIGHT: 241.96 LBS | HEIGHT: 69 IN

## 2019-11-18 DIAGNOSIS — I25.10 CORONARY ARTERY DISEASE INVOLVING NATIVE CORONARY ARTERY OF NATIVE HEART WITHOUT ANGINA PECTORIS: ICD-10-CM

## 2019-11-18 DIAGNOSIS — Z95.5 STENTED CORONARY ARTERY: ICD-10-CM

## 2019-11-18 DIAGNOSIS — E78.2 MIXED HYPERLIPIDEMIA: ICD-10-CM

## 2019-11-18 DIAGNOSIS — R07.89 OTHER CHEST PAIN: ICD-10-CM

## 2019-11-18 DIAGNOSIS — K21.9 GASTROESOPHAGEAL REFLUX DISEASE WITHOUT ESOPHAGITIS: ICD-10-CM

## 2019-11-18 DIAGNOSIS — R03.0 PRE-HYPERTENSION: ICD-10-CM

## 2019-11-18 DIAGNOSIS — R73.01 ELEVATED FASTING BLOOD SUGAR: ICD-10-CM

## 2019-11-18 DIAGNOSIS — R73.03 PREDIABETES: ICD-10-CM

## 2019-11-18 PROCEDURE — 99214 OFFICE O/P EST MOD 30 MIN: CPT | Performed by: INTERNAL MEDICINE

## 2019-11-18 RX ORDER — ATORVASTATIN CALCIUM 80 MG/1
80 TABLET, FILM COATED ORAL EVERY EVENING
Qty: 90 TAB | Refills: 3 | Status: SHIPPED | OUTPATIENT
Start: 2019-11-18 | End: 2020-08-21 | Stop reason: SDUPTHER

## 2019-11-18 RX ORDER — CLOPIDOGREL BISULFATE 75 MG/1
75 TABLET ORAL DAILY
Qty: 90 TAB | Refills: 3 | Status: SHIPPED | OUTPATIENT
Start: 2019-11-18 | End: 2019-11-18

## 2019-11-18 RX ORDER — LANSOPRAZOLE 30 MG/1
30 CAPSULE, DELAYED RELEASE ORAL DAILY
Qty: 90 CAP | Refills: 3 | Status: SHIPPED | OUTPATIENT
Start: 2019-11-18 | End: 2020-08-21 | Stop reason: SDUPTHER

## 2019-11-18 NOTE — PROGRESS NOTES
Subjective:   Chief Complaint:   Chief Complaint   Patient presents with   • Coronary Artery Disease       Giuseppe Bhandari is a 44 y.o. male who returns for CAD.  He was working out in the gym, had been having chest discomfort he thought was related to lifting weights, would get tired, lightheaded, fatigued, nauseous.     The month before his MI, he thought he had the flu. Was visiting here from [place].  His cardio was being limiting by sign shortness of breath. Was at the gym, struggling, thought he was going to dry home but somehow drove to the ED, he does not really recall making the decision to go to the ED, vaguely recalls being in the ED, they reported he was sweating heavily.    Had NSTEMI October 3, 2018 in [place], [facility] 95% prox LAD that was treated with a 4.5 x 18 mm drug-eluting stent.  There is minimal disease elsewhere.    Echocardiogram demonstrated a normal left ventricular systolic function with an LVEF of 60. Sounds like he may have had a Nuc or CT scan.  Had hives.  DAPT for one year.  His pretreatment LDL was 131.      Has HLP, on lipitor 80 mg, ldl 60, hdl low.  Has IFG.    Had orthostatic lightheadedness so no BB.    He is not limited by chest pain, pressure or tightness with activity.   He does get some resting CP at times which is different. Does not last long.  Reports stress from divorce.  Pain behind left shoulder blade, had MRA to r/o TAA because of friend's spouse who  from this.    Reports mild dyspnea on exertion with cardio exercise.  No orthopnea or lower extremity swelling.   No significant palpitations.  Has had lightheaded spells but not prolonged.  No  presyncope/syncope.   No symptoms of leg claudication.   No stroke/TIA like symptoms.    Fathers HX not known.  No family history of premature coronary artery disease on mother's side.  No prior smoking history.  No history of hypertension.  No history of autoimmune disease such as lupus or rheumatoid arthritis.  No chronic kidney  disease.    DATA REVIEWED by me:  ECG 10-2-18 [place]  Sinus, 98     Echo 10-3-18 [place]  EF 60%, normal wall motion    LHC 10-3-18 [place]  95% prox LAD, Resolute Angelo GARRET, 4.5x18 mm, LVEDP 15 mmHg    MRA chest 10-15-19  MRA of the thoracic aorta within normal limits. No aneurysmal dilatation or evidence of dissection.    Most recent labs:     9-7-19 h 15.4, p 217, ha 141, k 4.1, cr 0.98, lfts normal, a1c 6.1. ldl 60, hdl 39, tg 116, tc 122    History reviewed. No pertinent past medical history.  Past Surgical History:   Procedure Laterality Date   • VASECTOMY  2007   • LUMBAR FUSION POSTERIOR  2004    L4-S1     Family History   Problem Relation Age of Onset   • Diabetes Mother    • No Known Problems Father      Social History     Socioeconomic History   • Marital status:      Spouse name: Not on file   • Number of children: Not on file   • Years of education: Not on file   • Highest education level: Not on file   Occupational History   • Not on file   Social Needs   • Financial resource strain: Not on file   • Food insecurity:     Worry: Not on file     Inability: Not on file   • Transportation needs:     Medical: Not on file     Non-medical: Not on file   Tobacco Use   • Smoking status: Never Smoker   • Smokeless tobacco: Never Used   Substance and Sexual Activity   • Alcohol use: Yes     Frequency: Monthly or less     Drinks per session: 1 or 2     Binge frequency: Never     Comment: occasional, rare   • Drug use: No   • Sexual activity: Not Currently     Partners: Female   Lifestyle   • Physical activity:     Days per week: Not on file     Minutes per session: Not on file   • Stress: Not on file   Relationships   • Social connections:     Talks on phone: Not on file     Gets together: Not on file     Attends Alevism service: Not on file     Active member of club or organization: Not on file     Attends meetings of clubs or organizations: Not on file     Relationship status: Not on file   • Intimate partner  "violence:     Fear of current or ex partner: Not on file     Emotionally abused: Not on file     Physically abused: Not on file     Forced sexual activity: Not on file   Other Topics Concern   • Not on file   Social History Narrative   • Not on file     Allergies   Allergen Reactions   • Iodine Rash and Itching     Pt. States he broke out from IV contrast    • Latex Swelling     Pt. States he also gets blisters   • Pineapple    • Shellfish Allergy        Current Outpatient Medications   Medication Sig Dispense Refill   • atorvastatin (LIPITOR) 80 MG tablet Take 1 Tab by mouth every evening. 90 Tab 3   • lansoprazole (PREVACID) 30 MG CAPSULE DELAYED RELEASE Take 1 Cap by mouth every day. 90 Cap 3   • Multiple Vitamins-Minerals (MULTIVITAMIN ADULT PO) Take  by mouth.     • ADULT ASPIRIN LOW STRENGTH PO Take 81 mg by mouth.     • fexofenadine (ALLEGRA) 180 MG tablet Take 180 mg by mouth every day.       No current facility-administered medications for this visit.        ROS  All others systems reviewed and negative.     Objective:     /80 (BP Location: Left arm, Patient Position: Sitting, BP Cuff Size: Adult)   Pulse 70   Ht 1.753 m (5' 9\")   Wt 109.8 kg (241 lb 15.3 oz)   SpO2 96%  Body mass index is 35.73 kg/m².    Physical Exam   General: No acute distress. Well nourished.  HEENT: EOM grossly intact, no scleral icterus, no pharyngeal erythema.   Neck:  No JVD, no bruits, trachea midline  CVS: RRR. Normal S1, S2. No M/R/G. No LE edema.  2+ radial pulses, 2+ PT pulses  Resp: CTAB. No wheezing or crackles/rhonchi. Normal respiratory effort.  Abdomen: Soft, NT, no solo hepatomegaly.  MSK/Ext: No clubbing or cyanosis.  Skin: Warm and dry, no rashes.  Neurological: CN III-XII grossly intact. No focal deficits.   Psych: A&O x 3, appropriate affect, good judgement      Assessment:     1. Coronary artery disease involving native coronary artery of native heart without angina pectoris     2. Pre-hypertension     3. " Prediabetes     4. Gastroesophageal reflux disease without esophagitis     5. Elevated fasting blood sugar     6. Stented coronary artery     7. Mixed hyperlipidemia     8. Other chest pain         Medical Decision Making:  Today's Assessment / Status / Plan:     -Cont aggressive secondary prevention.  -Consider retrial with BB if BP ever is elevated.  -His current CP is different and atypical  -We discussed nitro PRN to test symptoms, he is not sure, will have available at any time  -cont to exercise  -Working with dietician now  -He likes to lift weights, I discussed that is not ideal, focus on cardio, lighter weights, more reps  -RTC 6 months, then yearly, blood work 1 year with us or PCP    Return in about 6 months (around 5/18/2020).    It is my pleasure to participate in the care of Mr. Bhandari.  Please do not hesitate to contact me with questions or concerns.    Jovanna Gee MD, [facility]  Cardiologist [facility]    Please note that this dictation was created using voice recognition software. I have made every reasonable attempt to correct obvious errors, but it is possible there are errors of grammar and possibly content that I did not discover before finalizing the note.

## 2019-11-18 NOTE — TELEPHONE ENCOUNTER
From: Giuseppe Bhandari  To: Mathieu Mccracken M.D.  Sent: 2019 7:48 PM PST  Subject: Prescription Question    My Rx from my previous PCP from [place] has  and I need a refill from Dr. Mccracken now since he is my PCP now. I am completely out and I thought I had another refill.

## 2019-11-18 NOTE — LETTER
[facility]   [address]  [place] [address]  Phone: [phone]  Fax: [phone]              Giuseppe Bhandari  1975    Encounter Date: 2019    Jovanna Gee M.D.          PROGRESS NOTE:  Subjective:   Chief Complaint:   Chief Complaint   Patient presents with   • Coronary Artery Disease       Giuseppe Bhandari is a 44 y.o. male who returns for CAD.  He was working out in the gym, had been having chest discomfort he thought was related to lifting weights, would get tired, lightheaded, fatigued, nauseous.     The month before his MI, he thought he had the flu. Was visiting here from [place].  His cardio was being limiting by sign shortness of breath. Was at the gym, struggling, thought he was going to dry home but somehow drove to the ED, he does not really recall making the decision to go to the ED, vaguely recalls being in the ED, they reported he was sweating heavily.    Had NSTEMI October 3, 2018 in [place], [facility] 95% prox LAD that was treated with a 4.5 x 18 mm drug-eluting stent.  There is minimal disease elsewhere.    Echocardiogram demonstrated a normal left ventricular systolic function with an LVEF of 60. Sounds like he may have had a Nuc or CT scan.  Had hives.  DAPT for one year.  His pretreatment LDL was 131.      Has HLP, on lipitor 80 mg, ldl 60, hdl low.  Has IFG.    Had orthostatic lightheadedness so no BB.    He is not limited by chest pain, pressure or tightness with activity.   He does get some resting CP at times which is different. Does not last long.  Reports stress from divorce.  Pain behind left shoulder blade, had MRA to r/o TAA because of friend's spouse who  from this.    Reports mild dyspnea on exertion with cardio exercise.  No orthopnea or lower extremity swelling.   No significant palpitations.  Has had lightheaded spells but not prolonged.  No  presyncope/syncope.   No symptoms of leg claudication.   No  stroke/TIA like symptoms.    Fathers HX not known.  No family history of premature coronary artery disease on mother's side.  No prior smoking history.  No history of hypertension.  No history of autoimmune disease such as lupus or rheumatoid arthritis.  No chronic kidney disease.    DATA REVIEWED by me:  ECG 10-2-18 [place]  Sinus, 98     [place]cho 10-3-18 [place]  EF 60%, normal wall motion    LHC 10-3-18 [place]  95% prox LAD, Resolute Angelo GARRET, 4.5x18 mm, LVEDP 15 mmHg    MRA chest 10-15-19  MRA of the thoracic aorta within normal limits. No aneurysmal dilatation or evidence of dissection.    Most recent labs:     9-7-19 h 15.4, p 217, ha 141, k 4.1, cr 0.98, lfts normal, a1c 6.1. ldl 60, hdl 39, tg 116, tc 122    History reviewed. No pertinent past medical history.  Past Surgical History:   Procedure Laterality Date   • VASECTOMY  2007   • LUMBAR FUSION POSTERIOR  2004    L4-S1     Family History   Problem Relation Age of Onset   • Diabetes Mother    • No Known Problems Father      Social History     Socioeconomic History   • Marital status:      Spouse name: Not on file   • Number of children: Not on file   • Years of education: Not on file   • Highest education level: Not on file   Occupational History   • Not on file   Social Needs   • Financial resource strain: Not on file   • Food insecurity:     Worry: Not on file     Inability: Not on file   • Transportation needs:     Medical: Not on file     Non-medical: Not on file   Tobacco Use   • Smoking status: Never Smoker   • Smokeless tobacco: Never Used   Substance and Sexual Activity   • Alcohol use: Yes     Frequency: Monthly or less     Drinks per session: 1 or 2     Binge frequency: Never     Comment: occasional, rare   • Drug use: No   • Sexual activity: Not Currently     Partners: Female   Lifestyle   • Physical activity:     Days per week: Not on file     Minutes per session: Not on file   • Stress: Not on file   Relationships   • Social connections:     Talks  "on phone: Not on file     Gets together: Not on file     Attends Scientology service: Not on file     Active member of club or organization: Not on file     Attends meetings of clubs or organizations: Not on file     Relationship status: Not on file   • Intimate partner violence:     Fear of current or ex partner: Not on file     Emotionally abused: Not on file     Physically abused: Not on file     Forced sexual activity: Not on file   Other Topics Concern   • Not on file   Social History Narrative   • Not on file     Allergies   Allergen Reactions   • Iodine Rash and Itching     Pt. States he broke out from IV contrast    • Latex Swelling     Pt. States he also gets blisters   • Pineapple    • Shellfish Allergy        Current Outpatient Medications   Medication Sig Dispense Refill   • atorvastatin (LIPITOR) 80 MG tablet Take 1 Tab by mouth every evening. 90 Tab 3   • lansoprazole (PREVACID) 30 MG CAPSULE DELAYED RELEASE Take 1 Cap by mouth every day. 90 Cap 3   • Multiple Vitamins-Minerals (MULTIVITAMIN ADULT PO) Take  by mouth.     • ADULT ASPIRIN LOW STRENGTH PO Take 81 mg by mouth.     • fexofenadine (ALLEGRA) 180 MG tablet Take 180 mg by mouth every day.       No current facility-administered medications for this visit.        ROS  All others systems reviewed and negative.     Objective:     /80 (BP Location: Left arm, Patient Position: Sitting, BP Cuff Size: Adult)   Pulse 70   Ht 1.753 m (5' 9\")   Wt 109.8 kg (241 lb 15.3 oz)   SpO2 96%  Body mass index is 35.73 kg/m².    Physical Exam   General: No acute distress. Well nourished.  HEENT: EOM grossly intact, no scleral icterus, no pharyngeal erythema.   Neck:  No JVD, no bruits, trachea midline  CVS: RRR. Normal S1, S2. No M/R/G. No LE edema.  2+ radial pulses, 2+ PT pulses  Resp: CTAB. No wheezing or crackles/rhonchi. Normal respiratory effort.  Abdomen: Soft, NT, no solo hepatomegaly.  MSK/Ext: No clubbing or cyanosis.  Skin: Warm and dry, no " sadi.  Neurological: CN III-XII grossly intact. No focal deficits.   Psych: A&O x 3, appropriate affect, good judgement      Assessment:     1. Coronary artery disease involving native coronary artery of native heart without angina pectoris     2. Pre-hypertension     3. Prediabetes     4. Gastroesophageal reflux disease without esophagitis     5. Elevated fasting blood sugar     6. Stented coronary artery     7. Mixed hyperlipidemia     8. Other chest pain         Medical Decision Making:  Today's Assessment / Status / Plan:     -Cont aggressive secondary prevention.  -Consider retrial with BB if BP ever is elevated.  -His current CP is different and atypical  -We discussed nitro PRN to test symptoms, he is not sure, will have available at any time  -cont to exercise  -Working with dietician now  -He likes to lift weights, I discussed that is not ideal, focus on cardio, lighter weights, more reps  -RTC 6 months, then yearly, blood work 1 year with us or PCP    Return in about 6 months (around 5/18/2020).    It is my pleasure to participate in the care of Mr. Bhandari.  Please do not hesitate to contact me with questions or concerns.    Jovanna Gee MD, [facility]  Cardiologist [facility]    Please note that this dictation was created using voice recognition software. I have made every reasonable attempt to correct obvious errors, but it is possible there are errors of grammar and possibly content that I did not discover before finalizing the note.      Mathieu Mccracken M.D.  [id number]  [id number]  [facility] [address]  VIA In Basket

## 2020-01-17 ENCOUNTER — HOSPITAL ENCOUNTER (OUTPATIENT)
Dept: RADIOLOGY | Facility: MEDICAL CENTER | Age: 45
End: 2020-01-17
Attending: FAMILY MEDICINE
Payer: COMMERCIAL

## 2020-01-17 DIAGNOSIS — R07.89 CHEST DISCOMFORT: ICD-10-CM

## 2020-01-17 PROCEDURE — 71046 X-RAY EXAM CHEST 2 VIEWS: CPT

## 2020-03-04 ENCOUNTER — OFFICE VISIT (OUTPATIENT)
Dept: URGENT CARE | Facility: PHYSICIAN GROUP | Age: 45
End: 2020-03-04
Payer: COMMERCIAL

## 2020-03-04 VITALS
WEIGHT: 240.2 LBS | OXYGEN SATURATION: 96 % | HEIGHT: 69 IN | SYSTOLIC BLOOD PRESSURE: 162 MMHG | HEART RATE: 76 BPM | RESPIRATION RATE: 16 BRPM | TEMPERATURE: 98 F | BODY MASS INDEX: 35.58 KG/M2 | DIASTOLIC BLOOD PRESSURE: 94 MMHG

## 2020-03-04 DIAGNOSIS — B34.9 ACUTE VIRAL SYNDROME: ICD-10-CM

## 2020-03-04 DIAGNOSIS — R03.0 ELEVATED BLOOD PRESSURE READING IN OFFICE WITHOUT DIAGNOSIS OF HYPERTENSION: ICD-10-CM

## 2020-03-04 DIAGNOSIS — H66.003 ACUTE SUPPURATIVE OTITIS MEDIA OF BOTH EARS WITHOUT SPONTANEOUS RUPTURE OF TYMPANIC MEMBRANES, RECURRENCE NOT SPECIFIED: ICD-10-CM

## 2020-03-04 LAB
FLUAV+FLUBV AG SPEC QL IA: NORMAL
INT CON NEG: NEGATIVE
INT CON POS: POSITIVE

## 2020-03-04 PROCEDURE — 99214 OFFICE O/P EST MOD 30 MIN: CPT | Performed by: PHYSICIAN ASSISTANT

## 2020-03-04 PROCEDURE — 87804 INFLUENZA ASSAY W/OPTIC: CPT | Performed by: PHYSICIAN ASSISTANT

## 2020-03-04 RX ORDER — AMOXICILLIN AND CLAVULANATE POTASSIUM 875; 125 MG/1; MG/1
1 TABLET, FILM COATED ORAL 2 TIMES DAILY
Qty: 20 TAB | Refills: 0 | Status: SHIPPED | OUTPATIENT
Start: 2020-03-04 | End: 2020-03-14

## 2020-03-04 RX ORDER — BENZONATATE 100 MG/1
200 CAPSULE ORAL 3 TIMES DAILY PRN
Qty: 60 CAP | Refills: 0 | Status: SHIPPED | OUTPATIENT
Start: 2020-03-04 | End: 2020-06-04

## 2020-03-04 ASSESSMENT — ENCOUNTER SYMPTOMS
SHORTNESS OF BREATH: 0
MYALGIAS: 1
COUGH: 0
SORE THROAT: 1
SPUTUM PRODUCTION: 0
HEADACHES: 1
CHILLS: 1
FEVER: 0

## 2020-03-04 ASSESSMENT — FIBROSIS 4 INDEX: FIB4 SCORE: 1.1

## 2020-03-04 NOTE — LETTER
March 4, 2020         Patient: Giuseppe Bhandari   YOB: 1975   Date of Visit: 3/4/2020           To Whom it May Concern:    Giuseppe Bhandari was seen in my clinic on 3/4/2020. He may return to work on 3/7/2020 or next regular previously arranged work day..    If you have any questions or concerns, please don't hesitate to call.        Sincerely,           Myranda Navarro P.A.-C.  Electronically Signed

## 2020-03-04 NOTE — PROGRESS NOTES
Subjective:   Giuseppe Bhandari  is a 44 y.o. male who presents for Sinusitis (sinus pressure,headache,runny nose,congestion,lungs hurt,ribs hurt from sneezing,started yesterday)    This is a new problem.  Patient presents to urgent care with 2-day history of nasal congestion with facial pain and pressure, headache, runny nose, lungs hurting and ribs hurting from sneezing however he has had no cough or shortness of breath.  Patient denies any fever.  He does admit to some generalized body aches and chills.  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    Patient has been taking over-the-counter multisymptom cold medication including decongestants and believes this is likely the cause of his elevated blood pressure in clinic today.  Patient has diagnosis of pre-hypertension but no diagnosis of hypertension.      Sinusitis   Associated symptoms include chills, congestion, ear pain, headaches and a sore throat. Pertinent negatives include no coughing or shortness of breath.     Review of Systems   Constitutional: Positive for chills and malaise/fatigue. Negative for fever.   HENT: Positive for congestion, ear pain and sore throat.    Respiratory: Negative for cough, sputum production and shortness of breath.    Musculoskeletal: Positive for myalgias.   Neurological: Positive for headaches.   All other systems reviewed and are negative.    Allergies   Allergen Reactions   • Iodine Rash and Itching     Pt. States he broke out from IV contrast    • Latex Swelling     Pt. States he also gets blisters   • Pineapple    • Shellfish Allergy      Reviewed past medical, surgical , social and family history.  Reviewed prescription and over-the-counter medications with patient and electronic health record today.     Objective:   /92 (BP Location: Left arm, Patient Position: Sitting, BP Cuff Size: Adult)   Pulse 76   Temp 36.7 °C (98 °F) (Temporal)    "Resp 16   Ht 1.753 m (5' 9\")   Wt 109 kg (240 lb 3.2 oz)   SpO2 96%   BMI 35.47 kg/m²   Physical Exam  Vitals signs reviewed.   Constitutional:       Appearance: He is well-developed. He is ill-appearing. He is not toxic-appearing.   HENT:      Head: Normocephalic and atraumatic.      Right Ear: Ear canal and external ear normal. A middle ear effusion is present. Tympanic membrane is erythematous. Tympanic membrane is not bulging.      Left Ear: Ear canal and external ear normal. A middle ear effusion is present. Tympanic membrane is erythematous. Tympanic membrane is not bulging.      Nose: Mucosal edema, congestion and rhinorrhea present.      Right Turbinates: Swollen.      Left Turbinates: Swollen.      Right Sinus: Maxillary sinus tenderness and frontal sinus tenderness present.      Left Sinus: Maxillary sinus tenderness and frontal sinus tenderness present.      Mouth/Throat:      Lips: Pink.      Mouth: Mucous membranes are moist.      Pharynx: Uvula midline. Posterior oropharyngeal erythema present. No oropharyngeal exudate.      Tonsils: No tonsillar exudate. 1+ on the right. 1+ on the left.   Eyes:      Conjunctiva/sclera: Conjunctivae normal.      Pupils: Pupils are equal, round, and reactive to light.   Neck:      Musculoskeletal: Normal range of motion and neck supple.   Cardiovascular:      Rate and Rhythm: Normal rate and regular rhythm.      Heart sounds: Normal heart sounds. No murmur. No friction rub.   Pulmonary:      Effort: Pulmonary effort is normal. No respiratory distress.      Breath sounds: Normal breath sounds.   Abdominal:      General: Bowel sounds are normal.      Palpations: Abdomen is soft.      Tenderness: There is no abdominal tenderness.   Musculoskeletal: Normal range of motion.   Lymphadenopathy:      Head:      Right side of head: No submental, submandibular or tonsillar adenopathy.      Left side of head: No submental, submandibular or tonsillar adenopathy.      Cervical: " No cervical adenopathy.      Upper Body:      Right upper body: No supraclavicular adenopathy.      Left upper body: No supraclavicular adenopathy.   Skin:     General: Skin is warm and dry.      Findings: No rash.   Neurological:      Mental Status: He is alert and oriented to person, place, and time.      Cranial Nerves: Cranial nerves are intact. No cranial nerve deficit.      Sensory: Sensation is intact. No sensory deficit.      Motor: Motor function is intact.      Coordination: Coordination is intact. Coordination normal.      Gait: Gait is intact.   Psychiatric:         Attention and Perception: Attention normal.         Mood and Affect: Mood normal.         Speech: Speech normal.         Behavior: Behavior normal.         Thought Content: Thought content normal.         Judgment: Judgment normal.           Assessment/Plan:   1. Acute viral syndrome  - POCT Influenza A/B  - benzonatate (TESSALON) 100 MG Cap; Take 2 Caps by mouth 3 times a day as needed.  Dispense: 60 Cap; Refill: 0    2. Acute suppurative otitis media of both ears without spontaneous rupture of tympanic membranes, recurrence not specified  - amoxicillin-clavulanate (AUGMENTIN) 875-125 MG Tab; Take 1 Tab by mouth 2 times a day for 10 days.  Dispense: 20 Tab; Refill: 0    3. Elevated blood pressure reading in office without diagnosis of hypertension    Results for orders placed or performed in visit on 03/04/20   POCT Influenza A/B   Result Value Ref Range    Rapid Influenza A-B Negtaive     Internal Control Positive Positive     Internal Control Negative Negative        Patient will be treated with Augmentin for bilateral otitis media.  He is also given Tessalon Perles as needed for cough.  Symptomatic, supportive care.  Increase fluids, rest.  Viral symptom relief handout is given.  Recommend avoidance of medications containing decongestants as this can certainly elevate blood pressure.  Encouraged nasal saline rinse and over-the-counter  Flonase nasal spray for congestion.    Differential diagnosis, natural history, supportive care, and indications for immediate follow-up discussed.     Red flag warning symptoms and strict ER/follow-up precautions given.  The patient demonstrated a good understanding and agreed with the treatment plan.  Please note that this note was created using voice recognition speech to text software. Every effort has been made to correct obvious errors.  However, I expect there are errors of grammar and possibly context that were not discovered prior to finalizing the note  CANDICE Navarro PA-C

## 2020-03-20 ENCOUNTER — EH NON-PROVIDER (OUTPATIENT)
Dept: OCCUPATIONAL MEDICINE | Facility: CLINIC | Age: 45
End: 2020-03-20

## 2020-03-20 DIAGNOSIS — Z02.89 ENCOUNTER FOR OCCUPATIONAL HEALTH EXAMINATION INVOLVING RESPIRATOR: ICD-10-CM

## 2020-03-20 PROCEDURE — 94375 RESPIRATORY FLOW VOLUME LOOP: CPT | Performed by: NURSE PRACTITIONER

## 2020-03-26 ENCOUNTER — TELEPHONE (OUTPATIENT)
Dept: CARDIOLOGY | Facility: MEDICAL CENTER | Age: 45
End: 2020-03-26

## 2020-05-31 NOTE — PROGRESS NOTES
Subjective:   Chief Complaint:   Chief Complaint   Patient presents with   • Coronary Artery Disease     Giuseppe Bhandari is a 44 y.o. male who returns for CAD/MI/stent.      He was working out in the gym, had been having chest discomfort he thought was related to lifting weights, would get tired, lightheaded, fatigued, nauseous.     The month before his MI, he thought he had the flu. Was visiting here from [place].  His cardio was being limiting by sign shortness of breath. Was at the gym, struggling, thought he was going to dry home but somehow drove to the ED, he does not really recall making the decision to go to the ED, vaguely recalls being in the ED, they reported he was sweating heavily.    Had NSTEMI October 3, 2018 in [place], [facility] 95% prox LAD that was treated with a 4.5 x 18 mm drug-eluting stent.  There is minimal disease elsewhere.      Echocardiogram demonstrated a normal left ventricular systolic function with an LVEF of 60. Sounds like he may have had a Nuc or CT scan.  Had hives.  DAPT for one year.  His pretreatment LDL was 131.      Has HLP, on lipitor 80 mg, ldl 60, hdl low.  Has IFG.    Borderline HTN, has been on meds in the past. Reports cough on lisinopril.    Had orthostatic lightheadedness so no BB.    He is not limited by chest pain, pressure or tightness with activity.   He does get some resting CP at times which is different. Does not last long. This went away.  Reports stress from divorce.  Pain behind left shoulder blade, had MRA to r/o TAA because of friend's spouse who  from this.    Reports mild dyspnea on exertion with cardio exercise.  No orthopnea or lower extremity swelling.   No significant palpitations.  Has had lightheaded spells but not prolonged.  No  presyncope/syncope.   No symptoms of leg claudication.   No stroke/TIA like symptoms.    Fathers HX not known.  No family history of premature coronary artery disease on mother's side.  No prior smoking history.  No history of  hypertension.  No history of autoimmune disease such as lupus or rheumatoid arthritis.  No chronic kidney disease.    Recently , has 3 kids with her.  Works as MA at [employer], [place]    DATA REVIEWED by me:  ECG 10-2-18 [place]  Sinus, 98     Echo 10-3-18 [place]  EF 60%, normal wall motion    LHC 10-3-18 [place]  95% prox LAD, Resolute Angelo GARRET, 4.5x18 mm, LVEDP 15 mmHg    MRA chest 10-15-19  MRA of the thoracic aorta within normal limits. No aneurysmal dilatation or evidence of dissection.    Most recent labs:     Lab Results   Component Value Date/Time    HEMOGLOBIN 15.4 09/07/2019 09:58 AM    HEMATOCRIT 48.0 09/07/2019 09:58 AM    MCV 89.6 09/07/2019 09:58 AM    INR 0.93 01/26/2006 01:23 PM      Lab Results   Component Value Date/Time    SODIUM 141 09/07/2019 09:58 AM    POTASSIUM 4.1 09/07/2019 09:58 AM    CHLORIDE 104 09/07/2019 09:58 AM    CO2 26 09/07/2019 09:58 AM    GLUCOSE 108 (H) 09/07/2019 09:58 AM    BUN 12 09/07/2019 09:58 AM    CREATININE 0.98 09/07/2019 09:58 AM    CREATININE 1.0 01/26/2006 01:23 PM      Lab Results   Component Value Date/Time    ASTSGOT 38 09/07/2019 09:58 AM    ALTSGPT 49 09/07/2019 09:58 AM    ALBUMIN 4.6 09/07/2019 09:58 AM      Lab Results   Component Value Date/Time    CHOLSTRLTOT 122 09/07/2019 09:58 AM    LDL 60 09/07/2019 09:58 AM    HDL 39 (A) 09/07/2019 09:58 AM    TRIGLYCERIDE 116 09/07/2019 09:58 AM       9-7-19 h 15.4, p 217, ha 141, k 4.1, cr 0.98, lfts normal, a1c 6.1. ldl 60, hdl 39, tg 116, tc 122    History reviewed. No pertinent past medical history.  Past Surgical History:   Procedure Laterality Date   • VASECTOMY  2007   • LUMBAR FUSION POSTERIOR  2004    L4-S1     Family History   Problem Relation Age of Onset   • Diabetes Mother    • No Known Problems Father      Social History     Socioeconomic History   • Marital status:      Spouse name: Not on file   • Number of children: Not on file   • Years of education: Not on file   • Highest  education level: Not on file   Occupational History   • Not on file   Social Needs   • Financial resource strain: Not on file   • Food insecurity     Worry: Not on file     Inability: Not on file   • Transportation needs     Medical: Not on file     Non-medical: Not on file   Tobacco Use   • Smoking status: Never Smoker   • Smokeless tobacco: Never Used   Substance and Sexual Activity   • Alcohol use: Yes     Frequency: Monthly or less     Drinks per session: 1 or 2     Binge frequency: Never     Comment: occasional, rare   • Drug use: No   • Sexual activity: Not Currently     Partners: Female   Lifestyle   • Physical activity     Days per week: Not on file     Minutes per session: Not on file   • Stress: Not on file   Relationships   • Social connections     Talks on phone: Not on file     Gets together: Not on file     Attends Advent service: Not on file     Active member of club or organization: Not on file     Attends meetings of clubs or organizations: Not on file     Relationship status: Not on file   • Intimate partner violence     Fear of current or ex partner: Not on file     Emotionally abused: Not on file     Physically abused: Not on file     Forced sexual activity: Not on file   Other Topics Concern   • Not on file   Social History Narrative   • Not on file     Allergies   Allergen Reactions   • Iodine Rash and Itching     Pt. States he broke out from IV contrast    • Latex Swelling     Pt. States he also gets blisters   • Lisinopril      Cough   • Pineapple    • Shellfish Allergy        Current Outpatient Medications   Medication Sig Dispense Refill   • metoprolol SR (TOPROL XL) 25 MG TABLET SR 24 HR Take 0.5 Tabs by mouth every day. 50 Tab 3   • atorvastatin (LIPITOR) 80 MG tablet Take 1 Tab by mouth every evening. 90 Tab 3   • lansoprazole (PREVACID) 30 MG CAPSULE DELAYED RELEASE Take 1 Cap by mouth every day. 90 Cap 3   • Multiple Vitamins-Minerals (MULTIVITAMIN ADULT PO) Take  by mouth.     •  "ADULT ASPIRIN LOW STRENGTH PO Take 81 mg by mouth.     • fexofenadine (ALLEGRA) 180 MG tablet Take 180 mg by mouth every day.       No current facility-administered medications for this visit.        ROS  All others systems reviewed and negative.     Objective:     /90 (BP Location: Left arm, Patient Position: Sitting, BP Cuff Size: Adult)   Pulse 65   Resp 12   Ht 1.753 m (5' 9\")   Wt 110.9 kg (244 lb 7.8 oz)   SpO2 95%  Body mass index is 36.1 kg/m².    Physical Exam   General: No acute distress. Well nourished.  HEENT: EOM grossly intact, no scleral icterus, no pharyngeal erythema.   Neck:  No JVD, no bruits, trachea midline  CVS: RRR. Normal S1, S2. No M/R/G. No LE edema.  2+ radial pulses, 2+ PT pulses  Resp: CTAB. No wheezing or crackles/rhonchi. Normal respiratory effort.  Abdomen: Soft, NT, no solo hepatomegaly.  MSK/Ext: No clubbing or cyanosis.  Skin: Warm and dry, no rashes.  Neurological: CN III-XII grossly intact. No focal deficits.   Psych: A&O x 3, appropriate affect, good judgement    Physical exam performed today and unchanged, except what is noted, compared to 11-18-19        Assessment:     1. Coronary artery disease involving native coronary artery of native heart without angina pectoris     2. Pre-hypertension     3. Prediabetes     4. Gastroesophageal reflux disease without esophagitis     5. Elevated fasting blood sugar     6. Stented coronary artery     7. Mixed hyperlipidemia     8. Other chest pain     9. Stable angina pectoris (HCC)         Medical Decision Making:  Today's Assessment / Status / Plan:     -Cont aggressive secondary prevention.  -Retrial BB, very low dose.  -His current CP is different and atypical  -We discussed nitro PRN to test symptoms, he is not sure, will have available at any time  -cont to exercise  -Trying to lose weight, could not work out with HIP due to schedule.  -He likes to lift weights, I discussed that is not ideal, focus on cardio, lighter " weights, more reps  -RTC 1 year    Written instructions given today:    -Start metoprolol succinate 12.5 mg once daily, call for any side effects.    Please look into the following diets:    Mediterranean diet.  [facility]  DASH DIET - American Heart Association (particularly for hypertension)  Ornish Diet   Vegan diet (proven to reverse vascular disease)    Resources to learn more:  American Heart Association   Www.Cardiosmart.org, sponsored by the American College of cardiology.    Dr. Pillo Baltazar for weight loss      Return in about 1 year (around 6/4/2021).    It is my pleasure to participate in the care of Mr. Bhandari.  Please do not hesitate to contact me with questions or concerns.    Jovanna Gee MD, [facility]  Cardiologist [facility]    Please note that this dictation was created using voice recognition software. I have made every reasonable attempt to correct obvious errors, but it is possible there are errors of grammar and possibly content that I did not discover before finalizing the note.

## 2020-06-04 ENCOUNTER — OFFICE VISIT (OUTPATIENT)
Dept: CARDIOLOGY | Facility: MEDICAL CENTER | Age: 45
End: 2020-06-04
Payer: COMMERCIAL

## 2020-06-04 VITALS
HEIGHT: 69 IN | BODY MASS INDEX: 36.21 KG/M2 | WEIGHT: 244.49 LBS | OXYGEN SATURATION: 95 % | HEART RATE: 65 BPM | RESPIRATION RATE: 12 BRPM | DIASTOLIC BLOOD PRESSURE: 90 MMHG | SYSTOLIC BLOOD PRESSURE: 140 MMHG

## 2020-06-04 DIAGNOSIS — R73.03 PREDIABETES: ICD-10-CM

## 2020-06-04 DIAGNOSIS — Z95.5 STENTED CORONARY ARTERY: ICD-10-CM

## 2020-06-04 DIAGNOSIS — I25.10 CORONARY ARTERY DISEASE INVOLVING NATIVE CORONARY ARTERY OF NATIVE HEART WITHOUT ANGINA PECTORIS: ICD-10-CM

## 2020-06-04 DIAGNOSIS — K21.9 GASTROESOPHAGEAL REFLUX DISEASE WITHOUT ESOPHAGITIS: ICD-10-CM

## 2020-06-04 DIAGNOSIS — R07.89 OTHER CHEST PAIN: ICD-10-CM

## 2020-06-04 DIAGNOSIS — R73.01 ELEVATED FASTING BLOOD SUGAR: ICD-10-CM

## 2020-06-04 DIAGNOSIS — R03.0 PRE-HYPERTENSION: ICD-10-CM

## 2020-06-04 DIAGNOSIS — I20.89 STABLE ANGINA PECTORIS (HCC): ICD-10-CM

## 2020-06-04 DIAGNOSIS — E78.2 MIXED HYPERLIPIDEMIA: ICD-10-CM

## 2020-06-04 PROCEDURE — 99214 OFFICE O/P EST MOD 30 MIN: CPT | Performed by: INTERNAL MEDICINE

## 2020-06-04 RX ORDER — METOPROLOL SUCCINATE 25 MG/1
12.5 TABLET, EXTENDED RELEASE ORAL DAILY
Qty: 50 TAB | Refills: 3 | Status: SHIPPED | OUTPATIENT
Start: 2020-06-04 | End: 2021-05-28 | Stop reason: SDUPTHER

## 2020-06-04 ASSESSMENT — FIBROSIS 4 INDEX: FIB4 SCORE: 1.1

## 2020-06-04 NOTE — LETTER
[facility]   [address], [address]  [place] [address]  Phone: [phone]  Fax: [phone]              Giuseppe Bhandari  1975    Encounter Date: 2020    Jovanna Gee M.D.          PROGRESS NOTE:  Subjective:   Chief Complaint:   Chief Complaint   Patient presents with   • Coronary Artery Disease     Giuseppe Bhandari is a 44 y.o. male who returns for CAD/MI/stent.      He was working out in the gym, had been having chest discomfort he thought was related to lifting weights, would get tired, lightheaded, fatigued, nauseous.     The month before his MI, he thought he had the flu. Was visiting here from [place].  His cardio was being limiting by sign shortness of breath. Was at the gym, struggling, thought he was going to dry home but somehow drove to the ED, he does not really recall making the decision to go to the ED, vaguely recalls being in the ED, they reported he was sweating heavily.    Had NSTEMI October 3, 2018 in [facility] 95% prox LAD that was treated with a 4.5 x 18 mm drug-eluting stent.  There is minimal disease elsewhere.      Echocardiogram demonstrated a normal left ventricular systolic function with an LVEF of 60. Sounds like he may have had a Nuc or CT scan.  Had hives.  DAPT for one year.  His pretreatment LDL was 131.      Has HLP, on lipitor 80 mg, ldl 60, hdl low.  Has IFG.    Borderline HTN, has been on meds in the past. Reports cough on lisinopril.    Had orthostatic lightheadedness so no BB.    He is not limited by chest pain, pressure or tightness with activity.   He does get some resting CP at times which is different. Does not last long. This went away.  Reports stress from divorce.  Pain behind left shoulder blade, had MRA to r/o TAA because of friend's spouse who  from this.    Reports mild dyspnea on exertion with cardio exercise.  No orthopnea or lower extremity swelling.   No significant palpitations.  Has had  lightheaded spells but not prolonged.  No  presyncope/syncope.   No symptoms of leg claudication.   No stroke/TIA like symptoms.    Fathers HX not known.  No family history of premature coronary artery disease on mother's side.  No prior smoking history.  No history of hypertension.  No history of autoimmune disease such as lupus or rheumatoid arthritis.  No chronic kidney disease.    Recently , has 3 kids with her.  Works as MA at [employer], [place]    DATA REVIEWED by me:  ECG 10-2-18 [place]  Sinus, 98     Echo 10-3-18 [place]  EF 60%, normal wall motion    LHC 10-3-18 [place]  95% prox LAD, Resolute [place] GARRET, 4.5x18 mm, LVEDP 15 mmHg    MRA chest 10-15-19  MRA of the thoracic aorta within normal limits. No aneurysmal dilatation or evidence of dissection.    Most recent labs:     Lab Results   Component Value Date/Time    HEMOGLOBIN 15.4 09/07/2019 09:58 AM    HEMATOCRIT 48.0 09/07/2019 09:58 AM    MCV 89.6 09/07/2019 09:58 AM    INR 0.93 01/26/2006 01:23 PM      Lab Results   Component Value Date/Time    SODIUM 141 09/07/2019 09:58 AM    POTASSIUM 4.1 09/07/2019 09:58 AM    CHLORIDE 104 09/07/2019 09:58 AM    CO2 26 09/07/2019 09:58 AM    GLUCOSE 108 (H) 09/07/2019 09:58 AM    BUN 12 09/07/2019 09:58 AM    CREATININE 0.98 09/07/2019 09:58 AM    CREATININE 1.0 01/26/2006 01:23 PM      Lab Results   Component Value Date/Time    ASTSGOT 38 09/07/2019 09:58 AM    ALTSGPT 49 09/07/2019 09:58 AM    ALBUMIN 4.6 09/07/2019 09:58 AM      Lab Results   Component Value Date/Time    CHOLSTRLTOT 122 09/07/2019 09:58 AM    LDL 60 09/07/2019 09:58 AM    HDL 39 (A) 09/07/2019 09:58 AM    TRIGLYCERIDE 116 09/07/2019 09:58 AM       9-7-19 h 15.4, p 217, ha 141, k 4.1, cr 0.98, lfts normal, a1c 6.1. ldl 60, hdl 39, tg 116, tc 122    History reviewed. No pertinent past medical history.  Past Surgical History:   Procedure Laterality Date   • VASECTOMY  2007   • LUMBAR FUSION POSTERIOR  2004    L4-S1     Family History      Problem Relation Age of Onset   • Diabetes Mother    • No Known Problems Father      Social History     Socioeconomic History   • Marital status:      Spouse name: Not on file   • Number of children: Not on file   • Years of education: Not on file   • Highest education level: Not on file   Occupational History   • Not on file   Social Needs   • Financial resource strain: Not on file   • Food insecurity     Worry: Not on file     Inability: Not on file   • Transportation needs     Medical: Not on file     Non-medical: Not on file   Tobacco Use   • Smoking status: Never Smoker   • Smokeless tobacco: Never Used   Substance and Sexual Activity   • Alcohol use: Yes     Frequency: Monthly or less     Drinks per session: 1 or 2     Binge frequency: Never     Comment: occasional, rare   • Drug use: No   • Sexual activity: Not Currently     Partners: Female   Lifestyle   • Physical activity     Days per week: Not on file     Minutes per session: Not on file   • Stress: Not on file   Relationships   • Social connections     Talks on phone: Not on file     Gets together: Not on file     Attends Hindu service: Not on file     Active member of club or organization: Not on file     Attends meetings of clubs or organizations: Not on file     Relationship status: Not on file   • Intimate partner violence     Fear of current or ex partner: Not on file     Emotionally abused: Not on file     Physically abused: Not on file     Forced sexual activity: Not on file   Other Topics Concern   • Not on file   Social History Narrative   • Not on file     Allergies   Allergen Reactions   • Iodine Rash and Itching     Pt. States he broke out from IV contrast    • Latex Swelling     Pt. States he also gets blisters   • Lisinopril      Cough   • Pineapple    • Shellfish Allergy        Current Outpatient Medications   Medication Sig Dispense Refill   • metoprolol SR (TOPROL XL) 25 MG TABLET SR 24 HR Take 0.5 Tabs by mouth every day.  "50 Tab 3   • atorvastatin (LIPITOR) 80 MG tablet Take 1 Tab by mouth every evening. 90 Tab 3   • lansoprazole (PREVACID) 30 MG CAPSULE DELAYED RELEASE Take 1 Cap by mouth every day. 90 Cap 3   • Multiple Vitamins-Minerals (MULTIVITAMIN ADULT PO) Take  by mouth.     • ADULT ASPIRIN LOW STRENGTH PO Take 81 mg by mouth.     • fexofenadine (ALLEGRA) 180 MG tablet Take 180 mg by mouth every day.       No current facility-administered medications for this visit.        ROS  All others systems reviewed and negative.     Objective:     /90 (BP Location: Left arm, Patient Position: Sitting, BP Cuff Size: Adult)   Pulse 65   Resp 12   Ht 1.753 m (5' 9\")   Wt 110.9 kg (244 lb 7.8 oz)   SpO2 95%  Body mass index is 36.1 kg/m².    Physical Exam   General: No acute distress. Well nourished.  HEENT: EOM grossly intact, no scleral icterus, no pharyngeal erythema.   Neck:  No JVD, no bruits, trachea midline  CVS: RRR. Normal S1, S2. No M/R/G. No LE edema.  2+ radial pulses, 2+ PT pulses  Resp: CTAB. No wheezing or crackles/rhonchi. Normal respiratory effort.  Abdomen: Soft, NT, no solo hepatomegaly.  MSK/Ext: No clubbing or cyanosis.  Skin: Warm and dry, no rashes.  Neurological: CN III-XII grossly intact. No focal deficits.   Psych: A&O x 3, appropriate affect, good judgement    Physical exam performed today and unchanged, except what is noted, compared to 11-18-19        Assessment:     1. Coronary artery disease involving native coronary artery of native heart without angina pectoris     2. Pre-hypertension     3. Prediabetes     4. Gastroesophageal reflux disease without esophagitis     5. Elevated fasting blood sugar     6. Stented coronary artery     7. Mixed hyperlipidemia     8. Other chest pain     9. Stable angina pectoris (HCC)         Medical Decision Making:  Today's Assessment / Status / Plan:     -Cont aggressive secondary prevention.  -Retrial BB, very low dose.  -His current CP is different and " atypical  -We discussed nitro PRN to test symptoms, he is not sure, will have available at any time  -cont to exercise  -Trying to lose weight, could not work out with HIP due to schedule.  -He likes to lift weights, I discussed that is not ideal, focus on cardio, lighter weights, more reps  -RTC 1 year    Written instructions given today:    -Start metoprolol succinate 12.5 mg once daily, call for any side effects.    Please look into the following diets:    Mediterranean diet.  [facility]  DASH DIET - American Heart Association (particularly for hypertension)  Ornish Diet   Vegan diet (proven to reverse vascular disease)    Resources to learn more:  American Heart Association   Www.Cardiosmart.org, sponsored by the American College of cardiology.    Dr. Pillo Baltazar for weight loss      Return in about 1 year (around 6/4/2021).    It is my pleasure to participate in the care of Mr. Bhandari.  Please do not hesitate to contact me with questions or concerns.    Jovanna Gee MD, [facility]  Cardiologist [facility]    Please note that this dictation was created using voice recognition software. I have made every reasonable attempt to correct obvious errors, but it is possible there are errors of grammar and possibly content that I did not discover before finalizing the note.      Mathieu Mccracken M.D.  [address]  [id number]  [place] [address]  VIA In Basket

## 2020-06-04 NOTE — PATIENT INSTRUCTIONS
-Start metoprolol succinate 12.5 mg once daily, call for any side effects.    Please look into the following diets:    Mediterranean diet.  [place] [facility]  DASH DIET - American Heart Association (particularly for hypertension)  Ornish Diet   Vegan diet (proven to reverse vascular disease)    Resources to learn more:  American Heart Association   Www.Cardiosmart.org, sponsored by the American College of cardiology.    Dr. Pillo Baltazar for weight loss

## 2020-06-30 ENCOUNTER — PATIENT MESSAGE (OUTPATIENT)
Dept: MEDICAL GROUP | Facility: MEDICAL CENTER | Age: 45
End: 2020-06-30

## 2020-06-30 DIAGNOSIS — Z00.00 ANNUAL PHYSICAL EXAM: ICD-10-CM

## 2020-08-14 ENCOUNTER — HOSPITAL ENCOUNTER (OUTPATIENT)
Dept: LAB | Facility: MEDICAL CENTER | Age: 45
End: 2020-08-14
Payer: COMMERCIAL

## 2020-08-14 ENCOUNTER — HOSPITAL ENCOUNTER (OUTPATIENT)
Dept: LAB | Facility: MEDICAL CENTER | Age: 45
End: 2020-08-14
Attending: FAMILY MEDICINE
Payer: COMMERCIAL

## 2020-08-14 DIAGNOSIS — Z00.00 ANNUAL PHYSICAL EXAM: ICD-10-CM

## 2020-08-14 LAB
ALBUMIN SERPL BCP-MCNC: 4.5 G/DL (ref 3.2–4.9)
ALBUMIN/GLOB SERPL: 1.6 G/DL
ALP SERPL-CCNC: 91 U/L (ref 30–99)
ALT SERPL-CCNC: 41 U/L (ref 2–50)
ANION GAP SERPL CALC-SCNC: 13 MMOL/L (ref 7–16)
AST SERPL-CCNC: 27 U/L (ref 12–45)
BASOPHILS # BLD AUTO: 0.7 % (ref 0–1.8)
BASOPHILS # BLD: 0.05 K/UL (ref 0–0.12)
BDY FAT % MEASURED: 26.1 %
BILIRUB SERPL-MCNC: 0.7 MG/DL (ref 0.1–1.5)
BP DIAS: 92 MMHG
BP SYS: 153 MMHG
BUN SERPL-MCNC: 15 MG/DL (ref 8–22)
CALCIUM SERPL-MCNC: 9.5 MG/DL (ref 8.5–10.5)
CHLORIDE SERPL-SCNC: 99 MMOL/L (ref 96–112)
CHOLEST SERPL-MCNC: 137 MG/DL (ref 100–199)
CHOLEST SERPL-MCNC: 137 MG/DL (ref 100–199)
CO2 SERPL-SCNC: 22 MMOL/L (ref 20–33)
CREAT SERPL-MCNC: 0.81 MG/DL (ref 0.5–1.4)
DIABETES HTDIA: NO
EOSINOPHIL # BLD AUTO: 0.07 K/UL (ref 0–0.51)
EOSINOPHIL NFR BLD: 1 % (ref 0–6.9)
ERYTHROCYTE [DISTWIDTH] IN BLOOD BY AUTOMATED COUNT: 40.6 FL (ref 35.9–50)
EST. AVERAGE GLUCOSE BLD GHB EST-MCNC: 146 MG/DL
EVENT NAME HTEVT: NORMAL
FASTING HTFAS: YES
FASTING STATUS PATIENT QL REPORTED: NORMAL
GLOBULIN SER CALC-MCNC: 2.9 G/DL (ref 1.9–3.5)
GLUCOSE SERPL-MCNC: 115 MG/DL (ref 65–99)
GLUCOSE SERPL-MCNC: 115 MG/DL (ref 65–99)
HBA1C MFR BLD: 6.7 % (ref 0–5.6)
HCT VFR BLD AUTO: 48.3 % (ref 42–52)
HDLC SERPL-MCNC: 32 MG/DL
HDLC SERPL-MCNC: 32 MG/DL
HGB BLD-MCNC: 16.3 G/DL (ref 14–18)
HYPERTENSION HTHYP: YES
IMM GRANULOCYTES # BLD AUTO: 0.01 K/UL (ref 0–0.11)
IMM GRANULOCYTES NFR BLD AUTO: 0.1 % (ref 0–0.9)
LDLC SERPL CALC-MCNC: 84 MG/DL
LDLC SERPL CALC-MCNC: 84 MG/DL
LYMPHOCYTES # BLD AUTO: 2.15 K/UL (ref 1–4.8)
LYMPHOCYTES NFR BLD: 30.2 % (ref 22–41)
MCH RBC QN AUTO: 30.1 PG (ref 27–33)
MCHC RBC AUTO-ENTMCNC: 33.7 G/DL (ref 33.7–35.3)
MCV RBC AUTO: 89.1 FL (ref 81.4–97.8)
MONOCYTES # BLD AUTO: 0.53 K/UL (ref 0–0.85)
MONOCYTES NFR BLD AUTO: 7.4 % (ref 0–13.4)
NEUTROPHILS # BLD AUTO: 4.32 K/UL (ref 1.82–7.42)
NEUTROPHILS NFR BLD: 60.6 % (ref 44–72)
NRBC # BLD AUTO: 0 K/UL
NRBC BLD-RTO: 0 /100 WBC
PLATELET # BLD AUTO: 224 K/UL (ref 164–446)
PMV BLD AUTO: 10.3 FL (ref 9–12.9)
POTASSIUM SERPL-SCNC: 3.8 MMOL/L (ref 3.6–5.5)
PROT SERPL-MCNC: 7.4 G/DL (ref 6–8.2)
RBC # BLD AUTO: 5.42 M/UL (ref 4.7–6.1)
SCREENING LOC CITY HTCIT: NORMAL
SCREENING LOC STATE HTSTA: NORMAL
SCREENING LOCATION HTLOC: NORMAL
SMOKING HTSMO: NO
SODIUM SERPL-SCNC: 134 MMOL/L (ref 135–145)
SUBSCRIBER ID HTSID: NORMAL
T4 FREE SERPL-MCNC: 1.22 NG/DL (ref 0.93–1.7)
TRIGL SERPL-MCNC: 104 MG/DL (ref 0–149)
TRIGL SERPL-MCNC: 105 MG/DL (ref 0–149)
TSH SERPL DL<=0.005 MIU/L-ACNC: 5.69 UIU/ML (ref 0.38–5.33)
WBC # BLD AUTO: 7.1 K/UL (ref 4.8–10.8)

## 2020-08-14 PROCEDURE — 84443 ASSAY THYROID STIM HORMONE: CPT

## 2020-08-14 PROCEDURE — 80053 COMPREHEN METABOLIC PANEL: CPT

## 2020-08-14 PROCEDURE — S5190 WELLNESS ASSESSMENT BY NONPH: HCPCS

## 2020-08-14 PROCEDURE — 36415 COLL VENOUS BLD VENIPUNCTURE: CPT

## 2020-08-14 PROCEDURE — 80061 LIPID PANEL: CPT

## 2020-08-14 PROCEDURE — 82947 ASSAY GLUCOSE BLOOD QUANT: CPT

## 2020-08-14 PROCEDURE — 84439 ASSAY OF FREE THYROXINE: CPT

## 2020-08-14 PROCEDURE — 80061 LIPID PANEL: CPT | Mod: 91

## 2020-08-14 PROCEDURE — 83036 HEMOGLOBIN GLYCOSYLATED A1C: CPT

## 2020-08-14 PROCEDURE — 85025 COMPLETE CBC W/AUTO DIFF WBC: CPT

## 2020-08-21 ENCOUNTER — OFFICE VISIT (OUTPATIENT)
Dept: MEDICAL GROUP | Facility: MEDICAL CENTER | Age: 45
End: 2020-08-21
Payer: COMMERCIAL

## 2020-08-21 VITALS
DIASTOLIC BLOOD PRESSURE: 80 MMHG | BODY MASS INDEX: 36.29 KG/M2 | SYSTOLIC BLOOD PRESSURE: 134 MMHG | HEIGHT: 69 IN | OXYGEN SATURATION: 95 % | HEART RATE: 63 BPM | WEIGHT: 245 LBS | TEMPERATURE: 96.6 F

## 2020-08-21 DIAGNOSIS — K21.9 GASTROESOPHAGEAL REFLUX DISEASE, ESOPHAGITIS PRESENCE NOT SPECIFIED: ICD-10-CM

## 2020-08-21 DIAGNOSIS — I25.10 CORONARY ARTERY DISEASE INVOLVING NATIVE CORONARY ARTERY OF NATIVE HEART WITHOUT ANGINA PECTORIS: ICD-10-CM

## 2020-08-21 DIAGNOSIS — R73.03 PREDIABETES: ICD-10-CM

## 2020-08-21 DIAGNOSIS — E03.8 SUBCLINICAL HYPOTHYROIDISM: ICD-10-CM

## 2020-08-21 DIAGNOSIS — Z00.00 ANNUAL PHYSICAL EXAM: ICD-10-CM

## 2020-08-21 DIAGNOSIS — E78.00 PURE HYPERCHOLESTEROLEMIA: ICD-10-CM

## 2020-08-21 DIAGNOSIS — R73.01 ELEVATED FASTING BLOOD SUGAR: ICD-10-CM

## 2020-08-21 PROCEDURE — 99396 PREV VISIT EST AGE 40-64: CPT | Performed by: FAMILY MEDICINE

## 2020-08-21 RX ORDER — ATORVASTATIN CALCIUM 80 MG/1
80 TABLET, FILM COATED ORAL EVERY EVENING
Qty: 90 TAB | Refills: 3 | Status: ON HOLD | OUTPATIENT
Start: 2020-08-21 | End: 2021-02-22 | Stop reason: SDUPTHER

## 2020-08-21 RX ORDER — LANSOPRAZOLE 30 MG/1
30 CAPSULE, DELAYED RELEASE ORAL DAILY
Qty: 90 CAP | Refills: 3 | Status: SHIPPED | OUTPATIENT
Start: 2020-08-21 | End: 2021-05-25 | Stop reason: SDUPTHER

## 2020-08-21 ASSESSMENT — PATIENT HEALTH QUESTIONNAIRE - PHQ9: CLINICAL INTERPRETATION OF PHQ2 SCORE: 0

## 2020-08-21 ASSESSMENT — FIBROSIS 4 INDEX: FIB4 SCORE: 0.83

## 2020-08-21 NOTE — ASSESSMENT & PLAN NOTE
Patient denies any chest pain or shortness of breath.  He is exercising regularly and trying to follow a healthy diet.  He has been started metoprolol SR 12.5 mg daily by his cardiologist.  He is also on atorvastatin 80 mg daily and aspirin 81 mg daily.

## 2020-08-21 NOTE — ASSESSMENT & PLAN NOTE
Patient denies any significant hypo-or hyperthyroid symptoms.  TSH is slightly elevated at 5.7 with a normal free T4.

## 2020-08-21 NOTE — PROGRESS NOTES
"Subjective:   Giuseppe Swift is a 44 y.o. male here today for annual    GERD (gastroesophageal reflux disease)  With increased caffeine intake he has noticed increased GERD at night.    Coronary artery disease involving native coronary artery of native heart without angina pectoris  Patient denies any chest pain or shortness of breath.  He is exercising regularly and trying to follow a healthy diet.  He has been started metoprolol SR 12.5 mg daily by his cardiologist.  He is also on atorvastatin 80 mg daily and aspirin 81 mg daily.    Elevated fasting blood sugar  Reviewed lab work with patient.  A1c went up from 6.1 to now 6.7.  After he saw the results he made dietary changes. He admits that he was drinking a lot of sugary energy drinks, which he has now stopped.     Subclinical hypothyroidism  Patient denies any significant hypo-or hyperthyroid symptoms.  TSH is slightly elevated at 5.7 with a normal free T4.         Current medicines (including changes today)  Current Outpatient Medications   Medication Sig Dispense Refill   • atorvastatin (LIPITOR) 80 MG tablet Take 1 Tab by mouth every evening. 90 Tab 3   • lansoprazole (PREVACID) 30 MG CAPSULE DELAYED RELEASE Take 1 Cap by mouth every day. 90 Cap 3   • metoprolol SR (TOPROL XL) 25 MG TABLET SR 24 HR Take 0.5 Tabs by mouth every day. 50 Tab 3   • Multiple Vitamins-Minerals (MULTIVITAMIN ADULT PO) Take  by mouth.     • ADULT ASPIRIN LOW STRENGTH PO Take 81 mg by mouth.     • fexofenadine (ALLEGRA) 180 MG tablet Take 180 mg by mouth every day.       No current facility-administered medications for this visit.      He  has no past medical history on file.    ROS   No chest pain, no shortness of breath       Objective:     /80 (BP Location: Right arm, Patient Position: Sitting)   Pulse 63   Temp 35.9 °C (96.6 °F) (Temporal)   Ht 1.753 m (5' 9\")   Wt 111.1 kg (245 lb)   SpO2 95%  Body mass index is 36.18 kg/m².   Physical Exam:  Constitutional: " Alert, no distress.  Skin: Warm, dry, good turgor, no rashes in visible areas.  Eye: Equal, round and reactive, conjunctiva clear, lids normal.  Respiratory: Unlabored respiratory effort, lungs clear to auscultation, no wheezes, no ronchi.  Cardiovascular: Normal S1, S2, no murmur, no edema.  Psych: Alert and oriented x3, normal affect and mood.        Assessment and Plan:   The following treatment plan was discussed    1. Annual physical exam  Encouraged healthy lifestyle.    2. Pure hypercholesterolemia  Controlled.  Continue atorvastatin 80 mg daily.  - atorvastatin (LIPITOR) 80 MG tablet; Take 1 Tab by mouth every evening.  Dispense: 90 Tab; Refill: 3    3. Elevated fasting blood sugar  Patient has made dietary changes and would like to do a 3-month trial of dietary changes before considering starting medication.  Check A1c in 3 months.  If no significant improvement and he is still diabetic then we should start metformin 500 mg twice daily.  If his A1c improves then we will monitor A1c annually.  - HEMOGLOBIN A1C; Future    4. Subclinical hypothyroidism  Asymptomatic.  Continue to monitor.    5. Prediabetes  - HEMOGLOBIN A1C; Future    6. Gastroesophageal reflux disease, esophagitis presence not specified  Continue Prevacid.  Advised dietary changes.  - lansoprazole (PREVACID) 30 MG CAPSULE DELAYED RELEASE; Take 1 Cap by mouth every day.  Dispense: 90 Cap; Refill: 3    7. Coronary artery disease involving native coronary artery of native heart without angina pectoris  Doing well.  Continue current medications and following with cardiology.      Followup: Return in about 1 year (around 8/21/2021) for Annual.

## 2020-08-21 NOTE — ASSESSMENT & PLAN NOTE
Reviewed lab work with patient.  A1c went up from 6.1 to now 6.7.  After he saw the results he made dietary changes. He admits that he was drinking a lot of sugary energy drinks, which he has now stopped.

## 2020-09-08 RX ORDER — LOSARTAN POTASSIUM 50 MG/1
50 TABLET ORAL DAILY
Qty: 90 TAB | Refills: 3 | Status: SHIPPED | OUTPATIENT
Start: 2020-09-08 | End: 2021-09-07 | Stop reason: SDUPTHER

## 2020-09-08 NOTE — PROGRESS NOTES
New prescription  Received: Today  Message Contents   Jovanna Gee M.D.  Mellisa Tyler RRUSLAN.             Please send new prescription for losartan 50 mg taken once daily, 90 with 3 refills.  Thank you.      Rx sent per MD request.

## 2020-10-01 ENCOUNTER — EH NON-PROVIDER (OUTPATIENT)
Dept: OCCUPATIONAL MEDICINE | Facility: CLINIC | Age: 45
End: 2020-10-01

## 2020-10-01 ENCOUNTER — OFFICE VISIT (OUTPATIENT)
Dept: MEDICAL GROUP | Facility: MEDICAL CENTER | Age: 45
End: 2020-10-01
Payer: COMMERCIAL

## 2020-10-01 VITALS
DIASTOLIC BLOOD PRESSURE: 96 MMHG | TEMPERATURE: 97.9 F | HEIGHT: 69 IN | BODY MASS INDEX: 35.7 KG/M2 | WEIGHT: 241 LBS | SYSTOLIC BLOOD PRESSURE: 140 MMHG | HEART RATE: 68 BPM | OXYGEN SATURATION: 96 %

## 2020-10-01 DIAGNOSIS — Z23 NEED FOR VACCINATION: Primary | ICD-10-CM

## 2020-10-01 DIAGNOSIS — K21.9 GASTROESOPHAGEAL REFLUX DISEASE, UNSPECIFIED WHETHER ESOPHAGITIS PRESENT: ICD-10-CM

## 2020-10-01 DIAGNOSIS — R10.32 LLQ ABDOMINAL PAIN: ICD-10-CM

## 2020-10-01 DIAGNOSIS — R19.4 CHANGE IN BOWEL HABITS: ICD-10-CM

## 2020-10-01 PROCEDURE — 99214 OFFICE O/P EST MOD 30 MIN: CPT | Performed by: FAMILY MEDICINE

## 2020-10-01 PROCEDURE — 90686 IIV4 VACC NO PRSV 0.5 ML IM: CPT | Performed by: NURSE PRACTITIONER

## 2020-10-01 ASSESSMENT — FIBROSIS 4 INDEX: FIB4 SCORE: 0.85

## 2020-10-01 NOTE — ASSESSMENT & PLAN NOTE
He has been having LLQ abdominal pain for the last 3 weeks and it is occurring with GERD in the middle of the night. He admits to significant constipation, which he treats with stool softeners but that is not helping fully. Usually does not have constipation. He is getting hemorrhoids. He has gone 2-3 days without bowel movement and when he has a BM it is small amount.  Appetite is reduced.  Had normal colonoscopy about 5-6 years ago.

## 2020-10-01 NOTE — ASSESSMENT & PLAN NOTE
He has been monitoring diet, decreased acidic food and spicy food.  He is having significant GERD in the middle of the night that wakes him up and he has to sleep sitting up.  He has tried Prevacid, omeprazole and Nexium without improvement. He needs Pepto at night, but sometimes that does not help either.  Had endoscopy about 5-6 years ago that was normal.

## 2020-10-01 NOTE — PROGRESS NOTES
"Subjective:   Giuseppe Swift is a 45 y.o. male here today for GERD and LLQ abdominal pain    GERD (gastroesophageal reflux disease)  He has been monitoring diet, decreased acidic food and spicy food.  He is having significant GERD in the middle of the night that wakes him up and he has to sleep sitting up.  He has tried Prevacid, omeprazole and Nexium without improvement. He needs Pepto at night, but sometimes that does not help either.  Had endoscopy about 5-6 years ago that was normal.    LLQ abdominal pain  He has been having LLQ abdominal pain for the last 3 weeks and it is occurring with GERD in the middle of the night. He admits to significant constipation, which he treats with stool softeners but that is not helping fully. Usually does not have constipation. He is getting hemorrhoids. He has gone 2-3 days without bowel movement and when he has a BM it is small amount.  Appetite is reduced.  Had normal colonoscopy about 5-6 years ago.         Current medicines (including changes today)  Current Outpatient Medications   Medication Sig Dispense Refill   • losartan (COZAAR) 50 MG Tab Take 1 Tab by mouth every day. 90 Tab 3   • atorvastatin (LIPITOR) 80 MG tablet Take 1 Tab by mouth every evening. 90 Tab 3   • lansoprazole (PREVACID) 30 MG CAPSULE DELAYED RELEASE Take 1 Cap by mouth every day. 90 Cap 3   • metoprolol SR (TOPROL XL) 25 MG TABLET SR 24 HR Take 0.5 Tabs by mouth every day. 50 Tab 3   • Multiple Vitamins-Minerals (MULTIVITAMIN ADULT PO) Take  by mouth.     • ADULT ASPIRIN LOW STRENGTH PO Take 81 mg by mouth.     • fexofenadine (ALLEGRA) 180 MG tablet Take 180 mg by mouth every day.       No current facility-administered medications for this visit.      He  has no past medical history on file.    ROS   No chest pain, no shortness of breath       Objective:     /96   Pulse 68   Temp 36.6 °C (97.9 °F) (Temporal)   Ht 1.753 m (5' 9\")   Wt 109.3 kg (241 lb)   SpO2 96%  Body mass index " is 35.59 kg/m².   Physical Exam:  Constitutional: Alert, no distress.  Skin: Warm, dry, good turgor, no rashes in visible areas.  Eye: Equal, round and reactive, conjunctiva clear, lids normal.  Psych: Alert and oriented x3, normal affect and mood.        Assessment and Plan:   The following treatment plan was discussed    1. Gastroesophageal reflux disease, unspecified whether esophagitis present  Uncontrolled. Most likely made worse from constipation. We will attempt to treat constipation and monitor symptoms.  Continue Prevacid for now.  If no improvement consider Carafate at night and schedule with GI.  - REFERRAL TO GASTROENTEROLOGY    2. LLQ abdominal pain  New problem.  Most likely related to constipation.  Discussed MiraLAX cleanout and using MiraLAX daily.  If no improvement consider scheduling with GI.  - REFERRAL TO GASTROENTEROLOGY    3. Change in bowel habits  Consider scheduling with GI especially if there is no improvement with constipation and GERD.  - REFERRAL TO GASTROENTEROLOGY      Followup: Return if symptoms worsen or fail to improve.

## 2020-11-12 ENCOUNTER — HOSPITAL ENCOUNTER (OUTPATIENT)
Dept: LAB | Facility: MEDICAL CENTER | Age: 45
End: 2020-11-12
Attending: FAMILY MEDICINE
Payer: COMMERCIAL

## 2020-11-12 ENCOUNTER — HOSPITAL ENCOUNTER (OUTPATIENT)
Dept: LAB | Facility: MEDICAL CENTER | Age: 45
End: 2020-11-12
Attending: INTERNAL MEDICINE
Payer: COMMERCIAL

## 2020-11-12 DIAGNOSIS — R73.01 ELEVATED FASTING BLOOD SUGAR: ICD-10-CM

## 2020-11-12 DIAGNOSIS — R73.03 PREDIABETES: ICD-10-CM

## 2020-11-12 LAB
ALBUMIN SERPL BCP-MCNC: 4.3 G/DL (ref 3.2–4.9)
ALBUMIN/GLOB SERPL: 1.7 G/DL
ALP SERPL-CCNC: 104 U/L (ref 30–99)
ALT SERPL-CCNC: 44 U/L (ref 2–50)
ANION GAP SERPL CALC-SCNC: 9 MMOL/L (ref 7–16)
APTT PPP: 32.5 SEC (ref 24.7–36)
AST SERPL-CCNC: 21 U/L (ref 12–45)
BASOPHILS # BLD AUTO: 0.8 % (ref 0–1.8)
BASOPHILS # BLD: 0.06 K/UL (ref 0–0.12)
BILIRUB SERPL-MCNC: 0.4 MG/DL (ref 0.1–1.5)
BUN SERPL-MCNC: 16 MG/DL (ref 8–22)
CALCIUM SERPL-MCNC: 9.1 MG/DL (ref 8.5–10.5)
CHLORIDE SERPL-SCNC: 103 MMOL/L (ref 96–112)
CO2 SERPL-SCNC: 26 MMOL/L (ref 20–33)
CREAT SERPL-MCNC: 0.92 MG/DL (ref 0.5–1.4)
EOSINOPHIL # BLD AUTO: 0.1 K/UL (ref 0–0.51)
EOSINOPHIL NFR BLD: 1.3 % (ref 0–6.9)
ERYTHROCYTE [DISTWIDTH] IN BLOOD BY AUTOMATED COUNT: 43 FL (ref 35.9–50)
EST. AVERAGE GLUCOSE BLD GHB EST-MCNC: 140 MG/DL
FERRITIN SERPL-MCNC: 259 NG/ML (ref 22–322)
GLOBULIN SER CALC-MCNC: 2.6 G/DL (ref 1.9–3.5)
GLUCOSE SERPL-MCNC: 124 MG/DL (ref 65–99)
HAV IGM SERPL QL IA: NORMAL
HBA1C MFR BLD: 6.5 % (ref 0–5.6)
HBV CORE AB SERPL QL IA: NONREACTIVE
HBV SURFACE AB SERPL IA-ACNC: 26.9 MIU/ML (ref 0–10)
HBV SURFACE AG SER QL: NORMAL
HCT VFR BLD AUTO: 47.8 % (ref 42–52)
HGB BLD-MCNC: 15.3 G/DL (ref 14–18)
IMM GRANULOCYTES # BLD AUTO: 0.02 K/UL (ref 0–0.11)
IMM GRANULOCYTES NFR BLD AUTO: 0.3 % (ref 0–0.9)
INR PPP: 0.95 (ref 0.87–1.13)
IRON SATN MFR SERPL: 30 % (ref 15–55)
IRON SERPL-MCNC: 79 UG/DL (ref 50–180)
LYMPHOCYTES # BLD AUTO: 2.52 K/UL (ref 1–4.8)
LYMPHOCYTES NFR BLD: 33.6 % (ref 22–41)
MCH RBC QN AUTO: 29.6 PG (ref 27–33)
MCHC RBC AUTO-ENTMCNC: 32 G/DL (ref 33.7–35.3)
MCV RBC AUTO: 92.5 FL (ref 81.4–97.8)
MONOCYTES # BLD AUTO: 0.53 K/UL (ref 0–0.85)
MONOCYTES NFR BLD AUTO: 7.1 % (ref 0–13.4)
NEUTROPHILS # BLD AUTO: 4.27 K/UL (ref 1.82–7.42)
NEUTROPHILS NFR BLD: 56.9 % (ref 44–72)
NRBC # BLD AUTO: 0 K/UL
NRBC BLD-RTO: 0 /100 WBC
PLATELET # BLD AUTO: 219 K/UL (ref 164–446)
PMV BLD AUTO: 10.9 FL (ref 9–12.9)
POTASSIUM SERPL-SCNC: 3.7 MMOL/L (ref 3.6–5.5)
PROT SERPL-MCNC: 6.9 G/DL (ref 6–8.2)
PROTHROMBIN TIME: 13 SEC (ref 12–14.6)
RBC # BLD AUTO: 5.17 M/UL (ref 4.7–6.1)
SODIUM SERPL-SCNC: 138 MMOL/L (ref 135–145)
TIBC SERPL-MCNC: 260 UG/DL (ref 250–450)
UIBC SERPL-MCNC: 181 UG/DL (ref 110–370)
WBC # BLD AUTO: 7.5 K/UL (ref 4.8–10.8)

## 2020-11-12 PROCEDURE — 86708 HEPATITIS A ANTIBODY: CPT

## 2020-11-12 PROCEDURE — 80053 COMPREHEN METABOLIC PANEL: CPT

## 2020-11-12 PROCEDURE — 83540 ASSAY OF IRON: CPT

## 2020-11-12 PROCEDURE — 87340 HEPATITIS B SURFACE AG IA: CPT

## 2020-11-12 PROCEDURE — 86038 ANTINUCLEAR ANTIBODIES: CPT

## 2020-11-12 PROCEDURE — 82728 ASSAY OF FERRITIN: CPT

## 2020-11-12 PROCEDURE — 83036 HEMOGLOBIN GLYCOSYLATED A1C: CPT

## 2020-11-12 PROCEDURE — 83516 IMMUNOASSAY NONANTIBODY: CPT

## 2020-11-12 PROCEDURE — 85610 PROTHROMBIN TIME: CPT

## 2020-11-12 PROCEDURE — 83550 IRON BINDING TEST: CPT

## 2020-11-12 PROCEDURE — 86709 HEPATITIS A IGM ANTIBODY: CPT

## 2020-11-12 PROCEDURE — 86704 HEP B CORE ANTIBODY TOTAL: CPT

## 2020-11-12 PROCEDURE — 36415 COLL VENOUS BLD VENIPUNCTURE: CPT

## 2020-11-12 PROCEDURE — 85730 THROMBOPLASTIN TIME PARTIAL: CPT

## 2020-11-12 PROCEDURE — 82390 ASSAY OF CERULOPLASMIN: CPT

## 2020-11-12 PROCEDURE — 85025 COMPLETE CBC W/AUTO DIFF WBC: CPT

## 2020-11-12 PROCEDURE — 86706 HEP B SURFACE ANTIBODY: CPT

## 2020-11-14 LAB
CERULOPLASMIN SERPL-MCNC: 26 MG/DL (ref 17–54)
HAV AB SER QL IA: POSITIVE
NUCLEAR IGG SER QL IA: NORMAL
SMA IGG SER-ACNC: 6 UNITS (ref 0–19)

## 2020-11-17 ENCOUNTER — HOSPITAL ENCOUNTER (OUTPATIENT)
Dept: RADIOLOGY | Facility: MEDICAL CENTER | Age: 45
End: 2020-11-17
Attending: INTERNAL MEDICINE
Payer: COMMERCIAL

## 2020-11-17 DIAGNOSIS — K59.00 CONSTIPATION, UNSPECIFIED CONSTIPATION TYPE: ICD-10-CM

## 2020-11-17 DIAGNOSIS — K92.1 BLOOD IN STOOL: ICD-10-CM

## 2020-11-17 DIAGNOSIS — K21.00 GASTROESOPHAGEAL REFLUX DISEASE WITH ESOPHAGITIS, UNSPECIFIED WHETHER HEMORRHAGE: ICD-10-CM

## 2020-11-17 DIAGNOSIS — R74.8 ACID PHOSPHATASE ELEVATED: ICD-10-CM

## 2020-11-17 PROCEDURE — 76700 US EXAM ABDOM COMPLETE: CPT

## 2020-11-20 ENCOUNTER — PATIENT MESSAGE (OUTPATIENT)
Dept: MEDICAL GROUP | Facility: MEDICAL CENTER | Age: 45
End: 2020-11-20

## 2020-11-20 RX ORDER — ACYCLOVIR 400 MG/1
400 TABLET ORAL 2 TIMES DAILY
Qty: 90 TAB | Refills: 3 | Status: SHIPPED | OUTPATIENT
Start: 2020-11-20 | End: 2021-05-25 | Stop reason: SDUPTHER

## 2020-11-20 NOTE — TELEPHONE ENCOUNTER
From: Giuseppe Swift  To: Mathieu Mccracken M.D.  Sent: 11/20/2020 7:26 AM PST  Subject: Prescription Question    Hi Dr Mccracken. I was wondering if I could get a refill on my Acyclovir 400mg. I am breaking out with cold sores on my lips. It's been a long time since I had to take them.

## 2020-12-05 ENCOUNTER — OFFICE VISIT (OUTPATIENT)
Dept: URGENT CARE | Facility: PHYSICIAN GROUP | Age: 45
End: 2020-12-05
Payer: COMMERCIAL

## 2020-12-05 ENCOUNTER — HOSPITAL ENCOUNTER (OUTPATIENT)
Dept: RADIOLOGY | Facility: MEDICAL CENTER | Age: 45
End: 2020-12-05
Attending: PHYSICIAN ASSISTANT
Payer: COMMERCIAL

## 2020-12-05 VITALS
SYSTOLIC BLOOD PRESSURE: 120 MMHG | TEMPERATURE: 97.2 F | OXYGEN SATURATION: 96 % | BODY MASS INDEX: 35.1 KG/M2 | DIASTOLIC BLOOD PRESSURE: 76 MMHG | HEART RATE: 64 BPM | RESPIRATION RATE: 16 BRPM | WEIGHT: 237 LBS | HEIGHT: 69 IN

## 2020-12-05 DIAGNOSIS — R07.9 CHEST PAIN, UNSPECIFIED TYPE: ICD-10-CM

## 2020-12-05 DIAGNOSIS — K21.9 GASTROESOPHAGEAL REFLUX DISEASE, UNSPECIFIED WHETHER ESOPHAGITIS PRESENT: ICD-10-CM

## 2020-12-05 PROCEDURE — 93000 ELECTROCARDIOGRAM COMPLETE: CPT | Performed by: PHYSICIAN ASSISTANT

## 2020-12-05 PROCEDURE — 71046 X-RAY EXAM CHEST 2 VIEWS: CPT

## 2020-12-05 PROCEDURE — 99214 OFFICE O/P EST MOD 30 MIN: CPT | Performed by: PHYSICIAN ASSISTANT

## 2020-12-05 ASSESSMENT — ENCOUNTER SYMPTOMS
ABDOMINAL PAIN: 0
CHILLS: 0
NAUSEA: 0
CONSTIPATION: 0
DIARRHEA: 0
VOMITING: 0
HEARTBURN: 1
SHORTNESS OF BREATH: 0
FEVER: 0
COUGH: 0

## 2020-12-05 ASSESSMENT — FIBROSIS 4 INDEX: FIB4 SCORE: 0.65

## 2020-12-05 NOTE — PROGRESS NOTES
Subjective:   Giuseppe Swift is a 45 y.o. male who presents for Chest Burn (upper center of chest burning started yesterday)        HPI   Patient presents to urgent care today for burning sensation in chest.  This has been present for 1 day.  He denies shortness of breath, nausea, vomiting.  No weakness or tingling in extremities.  No fever, chills.  No diaphoresis, dizziness.  He does have a history of GERD treated with Prevacid.  He does note he had pizza last night unsure if this is contributing to his symptoms. He has been taking prevacid BID for the past 3 weeks. No other aggravating or alleviating factors.    Review of Systems   Constitutional: Negative for chills, fever and malaise/fatigue.   Respiratory: Negative for cough and shortness of breath.    Cardiovascular: Positive for chest pain (chest burning ).   Gastrointestinal: Positive for heartburn. Negative for abdominal pain, constipation, diarrhea, nausea and vomiting.   All other systems reviewed and are negative.      PMH:  has no past medical history on file.  MEDS:   Current Outpatient Medications:   •  acyclovir (ZOVIRAX) 400 MG tablet, Take 1 Tab by mouth 2 times a day., Disp: 90 Tab, Rfl: 3  •  losartan (COZAAR) 50 MG Tab, Take 1 Tab by mouth every day., Disp: 90 Tab, Rfl: 3  •  atorvastatin (LIPITOR) 80 MG tablet, Take 1 Tab by mouth every evening., Disp: 90 Tab, Rfl: 3  •  lansoprazole (PREVACID) 30 MG CAPSULE DELAYED RELEASE, Take 1 Cap by mouth every day. (Patient taking differently: Take 30 mg by mouth 2 Times a Day.), Disp: 90 Cap, Rfl: 3  •  metoprolol SR (TOPROL XL) 25 MG TABLET SR 24 HR, Take 0.5 Tabs by mouth every day., Disp: 50 Tab, Rfl: 3  •  Multiple Vitamins-Minerals (MULTIVITAMIN ADULT PO), Take  by mouth., Disp: , Rfl:   •  ADULT ASPIRIN LOW STRENGTH PO, Take 81 mg by mouth., Disp: , Rfl:   •  fexofenadine (ALLEGRA) 180 MG tablet, Take 180 mg by mouth every day., Disp: , Rfl:   ALLERGIES:   Allergies   Allergen Reactions  "  • Iodine Rash and Itching     Pt. States he broke out from IV contrast    • Latex Swelling     Pt. States he also gets blisters   • Lisinopril      Cough   • Pineapple    • Shellfish Allergy      SURGHX:   Past Surgical History:   Procedure Laterality Date   • VASECTOMY  2007   • LUMBAR FUSION POSTERIOR  2004    L4-S1     SOCHX:  reports that he has never smoked. He has never used smokeless tobacco. He reports current alcohol use. He reports that he does not use drugs.  Family History   Problem Relation Age of Onset   • Diabetes Mother    • No Known Problems Father         Objective:   /76 (BP Location: Left arm, Patient Position: Sitting, BP Cuff Size: Large adult)   Pulse 64   Temp 36.2 °C (97.2 °F) (Temporal)   Resp 16   Ht 1.753 m (5' 9\")   Wt 107.5 kg (237 lb)   SpO2 96%   BMI 35.00 kg/m²     Physical Exam  Vitals signs reviewed.   Constitutional:       General: He is not in acute distress.     Appearance: He is well-developed.   HENT:      Head: Normocephalic and atraumatic.      Right Ear: External ear normal.      Left Ear: External ear normal.      Nose: Nose normal.      Mouth/Throat:      Mouth: Mucous membranes are moist.   Eyes:      Conjunctiva/sclera: Conjunctivae normal.      Pupils: Pupils are equal, round, and reactive to light.   Neck:      Musculoskeletal: Normal range of motion and neck supple.      Trachea: No tracheal deviation.   Cardiovascular:      Rate and Rhythm: Normal rate and regular rhythm.   Pulmonary:      Effort: Pulmonary effort is normal.      Breath sounds: Normal breath sounds.   Skin:     General: Skin is warm and dry.      Capillary Refill: Capillary refill takes less than 2 seconds.   Neurological:      General: No focal deficit present.      Mental Status: He is alert and oriented to person, place, and time.   Psychiatric:         Mood and Affect: Mood normal.         Behavior: Behavior normal.              Assessment/Plan:     1. Chest pain, unspecified type "  EKG    DX-CHEST-2 VIEWS   2. Gastroesophageal reflux disease, unspecified whether esophagitis present       CXR IMPRESSION: Negative two views of the chest.    EKG: Sinus rhythm, no significant ST changes.  No previous EKG to compare.    Patient given dose of Maalox in clinic sx resolved shortly after.     Follow-up with GI as scheduled next week.  If symptoms worsen or persist patient can return to clinic for reevaluation.  Red flags and strict emergency room precautions discussed. Patient confirmed understanding of information.    Please note that this dictation was created using voice recognition software. I have made every reasonable attempt to correct obvious errors, but I expect that there are errors of grammar and possibly content that I did not discover before finalizing the note.

## 2020-12-16 DIAGNOSIS — Z23 NEED FOR VACCINATION: ICD-10-CM

## 2020-12-18 ENCOUNTER — TELEPHONE (OUTPATIENT)
Dept: URGENT CARE | Facility: PHYSICIAN GROUP | Age: 45
End: 2020-12-18

## 2020-12-18 DIAGNOSIS — H10.31 ACUTE BACTERIAL CONJUNCTIVITIS OF RIGHT EYE: ICD-10-CM

## 2020-12-18 RX ORDER — POLYMYXIN B SULFATE AND TRIMETHOPRIM 1; 10000 MG/ML; [USP'U]/ML
SOLUTION OPHTHALMIC
Qty: 10 ML | Refills: 0 | Status: SHIPPED | OUTPATIENT
Start: 2020-12-18 | End: 2021-05-05

## 2020-12-31 ENCOUNTER — IMMUNIZATION (OUTPATIENT)
Dept: FAMILY PLANNING/WOMEN'S HEALTH CLINIC | Facility: IMMUNIZATION CENTER | Age: 45
End: 2020-12-31
Attending: FAMILY MEDICINE
Payer: COMMERCIAL

## 2020-12-31 DIAGNOSIS — Z23 NEED FOR VACCINATION: ICD-10-CM

## 2020-12-31 DIAGNOSIS — Z23 ENCOUNTER FOR VACCINATION: Primary | ICD-10-CM

## 2020-12-31 PROCEDURE — 91301 MODERNA SARS-COV-2 VACCINE: CPT

## 2020-12-31 PROCEDURE — 0011A MODERNA SARS-COV-2 VACCINE: CPT

## 2021-01-21 ENCOUNTER — TELEPHONE (OUTPATIENT)
Dept: URGENT CARE | Facility: PHYSICIAN GROUP | Age: 46
End: 2021-01-21

## 2021-01-21 DIAGNOSIS — I25.10 CORONARY ARTERY DISEASE INVOLVING NATIVE CORONARY ARTERY OF NATIVE HEART WITHOUT ANGINA PECTORIS: ICD-10-CM

## 2021-01-21 DIAGNOSIS — E78.00 PURE HYPERCHOLESTEROLEMIA: ICD-10-CM

## 2021-01-21 RX ORDER — ATORVASTATIN CALCIUM 80 MG/1
80 TABLET, FILM COATED ORAL EVERY EVENING
Qty: 30 TAB | Refills: 0 | Status: SHIPPED | OUTPATIENT
Start: 2021-01-21 | End: 2021-01-25

## 2021-01-22 NOTE — TELEPHONE ENCOUNTER
Patient is out of his statin. His PCP just left the practice and Giuseppe is establishing with a new doc next week. Rx refilled

## 2021-01-25 ENCOUNTER — OFFICE VISIT (OUTPATIENT)
Dept: MEDICAL GROUP | Facility: PHYSICIAN GROUP | Age: 46
End: 2021-01-25
Payer: COMMERCIAL

## 2021-01-25 VITALS
HEIGHT: 69 IN | TEMPERATURE: 97.5 F | BODY MASS INDEX: 34.96 KG/M2 | OXYGEN SATURATION: 95 % | SYSTOLIC BLOOD PRESSURE: 122 MMHG | HEART RATE: 74 BPM | DIASTOLIC BLOOD PRESSURE: 80 MMHG | WEIGHT: 236 LBS

## 2021-01-25 DIAGNOSIS — F43.9 STRESS: ICD-10-CM

## 2021-01-25 DIAGNOSIS — I25.10 CORONARY ARTERY DISEASE INVOLVING NATIVE CORONARY ARTERY OF NATIVE HEART WITHOUT ANGINA PECTORIS: ICD-10-CM

## 2021-01-25 DIAGNOSIS — R10.32 LLQ ABDOMINAL PAIN: ICD-10-CM

## 2021-01-25 DIAGNOSIS — R73.03 PREDIABETES: ICD-10-CM

## 2021-01-25 DIAGNOSIS — E03.8 SUBCLINICAL HYPOTHYROIDISM: ICD-10-CM

## 2021-01-25 PROCEDURE — 99213 OFFICE O/P EST LOW 20 MIN: CPT | Performed by: FAMILY MEDICINE

## 2021-01-25 RX ORDER — SULFAMETHOXAZOLE AND TRIMETHOPRIM 800; 160 MG/1; MG/1
TABLET ORAL
COMMUNITY
Start: 2020-12-12 | End: 2021-01-10

## 2021-01-25 SDOH — ECONOMIC STABILITY: HOUSING INSECURITY
IN THE LAST 12 MONTHS, WAS THERE A TIME WHEN YOU DID NOT HAVE A STEADY PLACE TO SLEEP OR SLEPT IN A SHELTER (INCLUDING NOW)?: NO

## 2021-01-25 SDOH — ECONOMIC STABILITY: INCOME INSECURITY: IN THE LAST 12 MONTHS, WAS THERE A TIME WHEN YOU WERE NOT ABLE TO PAY THE MORTGAGE OR RENT ON TIME?: NO

## 2021-01-25 SDOH — SOCIAL STABILITY: SOCIAL NETWORK
DO YOU BELONG TO ANY CLUBS OR ORGANIZATIONS SUCH AS CHURCH GROUPS UNIONS, FRATERNAL OR ATHLETIC GROUPS, OR SCHOOL GROUPS?: YES

## 2021-01-25 SDOH — SOCIAL STABILITY: SOCIAL NETWORK: HOW OFTEN DO YOU ATTEND CHURCH OR RELIGIOUS SERVICES?: MORE THAN 4 TIMES PER YEAR

## 2021-01-25 SDOH — HEALTH STABILITY: PHYSICAL HEALTH: ON AVERAGE, HOW MANY DAYS PER WEEK DO YOU ENGAGE IN MODERATE TO STRENUOUS EXERCISE (LIKE A BRISK WALK)?: 4 DAYS

## 2021-01-25 SDOH — SOCIAL STABILITY: SOCIAL NETWORK: ARE YOU MARRIED, WIDOWED, DIVORCED, SEPARATED, NEVER MARRIED, OR LIVING WITH A PARTNER?: DIVORCED

## 2021-01-25 SDOH — SOCIAL STABILITY: SOCIAL NETWORK: HOW OFTEN DO YOU ATTENT MEETINGS OF THE CLUB OR ORGANIZATION YOU BELONG TO?: MORE THAN 4 TIMES PER YEAR

## 2021-01-25 SDOH — SOCIAL STABILITY: SOCIAL NETWORK: HOW OFTEN DO YOU GET TOGETHER WITH FRIENDS OR RELATIVES?: ONCE A WEEK

## 2021-01-25 SDOH — ECONOMIC STABILITY: TRANSPORTATION INSECURITY
IN THE PAST 12 MONTHS, HAS THE LACK OF TRANSPORTATION KEPT YOU FROM MEDICAL APPOINTMENTS OR FROM GETTING MEDICATIONS?: NO

## 2021-01-25 SDOH — HEALTH STABILITY: MENTAL HEALTH
STRESS IS WHEN SOMEONE FEELS TENSE, NERVOUS, ANXIOUS, OR CAN'T SLEEP AT NIGHT BECAUSE THEIR MIND IS TROUBLED. HOW STRESSED ARE YOU?: TO SOME EXTENT

## 2021-01-25 SDOH — ECONOMIC STABILITY: FOOD INSECURITY: WITHIN THE PAST 12 MONTHS, THE FOOD YOU BOUGHT JUST DIDN'T LAST AND YOU DIDN'T HAVE MONEY TO GET MORE.: NEVER TRUE

## 2021-01-25 SDOH — ECONOMIC STABILITY: HOUSING INSECURITY

## 2021-01-25 SDOH — ECONOMIC STABILITY: FOOD INSECURITY: WITHIN THE PAST 12 MONTHS, YOU WORRIED THAT YOUR FOOD WOULD RUN OUT BEFORE YOU GOT MONEY TO BUY MORE.: NEVER TRUE

## 2021-01-25 SDOH — HEALTH STABILITY: PHYSICAL HEALTH: ON AVERAGE, HOW MANY MINUTES DO YOU ENGAGE IN EXERCISE AT THIS LEVEL?: 120 MIN

## 2021-01-25 SDOH — ECONOMIC STABILITY: TRANSPORTATION INSECURITY
IN THE PAST 12 MONTHS, HAS LACK OF RELIABLE TRANSPORTATION KEPT YOU FROM MEDICAL APPOINTMENTS, MEETINGS, WORK OR FROM GETTING THINGS NEEDED FOR DAILY LIVING?: NO

## 2021-01-25 SDOH — HEALTH STABILITY: PHYSICAL HEALTH: ON AVERAGE, HOW MANY MINUTES DO YOU ENGAGE IN EXERCISE AT THIS LEVEL?: 120 MINUTES

## 2021-01-25 SDOH — ECONOMIC STABILITY: TRANSPORTATION INSECURITY
IN THE PAST 12 MONTHS, HAS LACK OF TRANSPORTATION KEPT YOU FROM MEETINGS, WORK, OR FROM GETTING THINGS NEEDED FOR DAILY LIVING?: NO

## 2021-01-25 SDOH — ECONOMIC STABILITY: INCOME INSECURITY: HOW HARD IS IT FOR YOU TO PAY FOR THE VERY BASICS LIKE FOOD, HOUSING, MEDICAL CARE, AND HEATING?: NOT HARD AT ALL

## 2021-01-25 SDOH — SOCIAL STABILITY: SOCIAL NETWORK
IN A TYPICAL WEEK, HOW MANY TIMES DO YOU TALK ON THE PHONE WITH FAMILY, FRIENDS, OR NEIGHBORS?: MORE THAN THREE TIMES A WEEK

## 2021-01-25 ASSESSMENT — FIBROSIS 4 INDEX: FIB4 SCORE: 0.65

## 2021-01-25 ASSESSMENT — SOCIAL DETERMINANTS OF HEALTH (SDOH)
WITHIN THE PAST 12 MONTHS, YOU WORRIED THAT YOUR FOOD WOULD RUN OUT BEFORE YOU GOT THE MONEY TO BUY MORE: NEVER TRUE
HOW OFTEN DO YOU HAVE SIX OR MORE DRINKS ON ONE OCCASION: NEVER
HOW OFTEN DO YOU GET TOGETHER WITH FRIENDS OR RELATIVES?: ONCE A WEEK
HOW HARD IS IT FOR YOU TO PAY FOR THE VERY BASICS LIKE FOOD, HOUSING, MEDICAL CARE, AND HEATING?: NOT HARD AT ALL
HOW OFTEN DO YOU ATTENT MEETINGS OF THE CLUB OR ORGANIZATION YOU BELONG TO?: MORE THAN 4 TIMES PER YEAR
WITHIN THE PAST 12 MONTHS, THE FOOD YOU BOUGHT JUST DIDN'T LAST AND YOU DIDN'T HAVE MONEY TO GET MORE: NEVER TRUE
IN A TYPICAL WEEK, HOW MANY TIMES DO YOU TALK ON THE PHONE WITH FAMILY, FRIENDS, OR NEIGHBORS?: MORE THAN THREE TIMES A WEEK
HOW OFTEN DO YOU ATTEND CHURCH OR RELIGIOUS SERVICES?: MORE THAN 4 TIMES PER YEAR
HOW OFTEN DO YOU HAVE A DRINK CONTAINING ALCOHOL: MONTHLY OR LESS
HOW MANY DRINKS CONTAINING ALCOHOL DO YOU HAVE ON A TYPICAL DAY WHEN YOU ARE DRINKING: 1 OR 2
DO YOU BELONG TO ANY CLUBS OR ORGANIZATIONS SUCH AS CHURCH GROUPS UNIONS, FRATERNAL OR ATHLETIC GROUPS, OR SCHOOL GROUPS?: YES

## 2021-01-25 ASSESSMENT — PATIENT HEALTH QUESTIONNAIRE - PHQ9: CLINICAL INTERPRETATION OF PHQ2 SCORE: 0

## 2021-01-26 NOTE — ASSESSMENT & PLAN NOTE
This is a chronic problem.  Patient is under stress at the current time due to a nurse he had over a year ago.  He still having undergo legal issues with that plus her children involved.  He is in another relationship now and much happier.  Also has stress at work and stress over the abdominal issues with abnormal colonoscopy.  States that he has changed his diet and lost a little weight.  That does not seem to make any difference in the vague abdominal discomfort has been getting.

## 2021-01-26 NOTE — PROGRESS NOTES
Subjective:     CC: Here to establish care.    HPI:   Giuseppe presents today with the following medical issues:    Coronary artery disease involving native coronary artery of native heart without angina pectoris  This is a chronic problem.  Patient is followed up by cardiology regularly. he is watching his diet and exercises on a regular basis.    Subclinical hypothyroidism  This is a recent problem.  Patient has had mild elevation of his TSH with normal T3 and T4.  He is thought to have subclinical hypothyroidism.    Prediabetes  This is a chronic problem.  Patient has had borderline hemoglobin A1c elevations and is followed on a regular basis.    LLQ abdominal pain  This is a chronic problem.  Patient is being worked up by GI.  He states he had an EGD and colonoscopy.  The EGD was normal but the colonoscopy had some type of submucosal lesion.  He supposed to go back and have additional biopsies done or what sounds like an ultrasound procedure done.    Stress  This is a chronic problem.  Patient is under stress at the current time due to a nurse he had over a year ago.  He still having undergo legal issues with that plus her children involved.  He is in another relationship now and much happier.  Also has stress at work and stress over the abdominal issues with abnormal colonoscopy.  States that he has changed his diet and lost a little weight.  That does not seem to make any difference in the vague abdominal discomfort has been getting.      History reviewed. No pertinent past medical history.    Social History     Tobacco Use   • Smoking status: Never Smoker   • Smokeless tobacco: Never Used   Substance Use Topics   • Alcohol use: Yes     Frequency: Monthly or less     Drinks per session: 1 or 2     Binge frequency: Never     Comment: occasional, rare   • Drug use: No       Current Outpatient Medications Ordered in Epic   Medication Sig Dispense Refill   • polymixin-trimethoprim (POLYTRIM) 80292-6.1 UNIT/ML-%  "Solution 1 drop to the affected eye every 4 hours while awake for 7 days 10 mL 0   • acyclovir (ZOVIRAX) 400 MG tablet Take 1 Tab by mouth 2 times a day. 90 Tab 3   • losartan (COZAAR) 50 MG Tab Take 1 Tab by mouth every day. 90 Tab 3   • atorvastatin (LIPITOR) 80 MG tablet Take 1 Tab by mouth every evening. 90 Tab 3   • lansoprazole (PREVACID) 30 MG CAPSULE DELAYED RELEASE Take 1 Cap by mouth every day. (Patient taking differently: Take 30 mg by mouth 2 Times a Day.) 90 Cap 3   • metoprolol SR (TOPROL XL) 25 MG TABLET SR 24 HR Take 0.5 Tabs by mouth every day. 50 Tab 3   • Multiple Vitamins-Minerals (MULTIVITAMIN ADULT PO) Take  by mouth.     • ADULT ASPIRIN LOW STRENGTH PO Take 81 mg by mouth.     • fexofenadine (ALLEGRA) 180 MG tablet Take 180 mg by mouth every day.       No current Epic-ordered facility-administered medications on file.        Allergies:  Iodine, Latex, Cat hair extract, Lisinopril, Pineapple, and Shellfish allergy    Health Maintenance: Completed    ROS:  Gen: no fevers/chills, no changes in weight  Eyes: no changes in vision  ENT: no sore throat, no hearing loss, no bloody nose  Pulm: no sob, no cough  CV: no chest pain, no palpitations  GI: no nausea/vomiting, no diarrhea  : no dysuria  MSk: no myalgias  Skin: no rash  Neuro: no headaches, no numbness/tingling  Heme/Lymph: no easy bruising      Objective:       Exam:  /80 (BP Location: Left arm, Patient Position: Sitting, BP Cuff Size: Large adult)   Pulse 74   Temp 36.4 °C (97.5 °F) (Temporal)   Ht 1.753 m (5' 9\")   Wt 107 kg (236 lb)   SpO2 95%   BMI 34.85 kg/m²  Body mass index is 34.85 kg/m².    Gen: Alert and oriented, No apparent distress.  Psych: Patient appears alert and oriented x3.  He makes good eye contact.  He does not appear to be stressed.  No unusual thought processes expressed.        Labs: Previous lab results in the chart reviewed.    Assessment & Plan:     45 y.o. male with the following -     1. LLQ " abdominal pain  This is a chronic problem.  Continue follow-up with gastroenterology.  Patient was told not to worry about the abnormal lesion he found to tell her something to worry about.  Hopefully will turn out to be something benign.  He was told of all the work-up is negative he might could be tried on a SSRI to see if that makes a difference.    2. Prediabetes  This is a chronic condition.  We will recheck his labs in a month.  - HEMOGLOBIN A1C; Future    3. Subclinical hypothyroidism  Is a new problem.  We will recheck his lab in a month.  - TSH WITH REFLEX TO FT4; Future    4. Stress  This is a chronic problem.  Continue to follow.    5. Coronary artery disease involving native coronary artery of native heart without angina pectoris  This is a chronic condition.  Continue with current cardiology recommendations.      Return in about 1 year (around 1/25/2022) for annual exam.    Please note that this dictation was created using voice recognition software. I have made every reasonable attempt to correct obvious errors, but I expect that there are errors of grammar and possibly content that I did not discover before finalizing the note.

## 2021-01-26 NOTE — ASSESSMENT & PLAN NOTE
This is a chronic problem.  Patient has had borderline hemoglobin A1c elevations and is followed on a regular basis.

## 2021-01-26 NOTE — ASSESSMENT & PLAN NOTE
This is a chronic problem.  Patient is being worked up by GI.  He states he had an EGD and colonoscopy.  The EGD was normal but the colonoscopy had some type of submucosal lesion.  He supposed to go back and have additional biopsies done or what sounds like an ultrasound procedure done.

## 2021-01-26 NOTE — ASSESSMENT & PLAN NOTE
This is a chronic problem.  Patient is followed up by cardiology regularly. he is watching his diet and exercises on a regular basis.

## 2021-01-26 NOTE — ASSESSMENT & PLAN NOTE
This is a recent problem.  Patient has had mild elevation of his TSH with normal T3 and T4.  He is thought to have subclinical hypothyroidism.

## 2021-01-30 PROCEDURE — 0012A MODERNA SARS-COV-2 VACCINE: CPT

## 2021-01-30 PROCEDURE — 91301 MODERNA SARS-COV-2 VACCINE: CPT

## 2021-01-31 ENCOUNTER — IMMUNIZATION (OUTPATIENT)
Dept: FAMILY PLANNING/WOMEN'S HEALTH CLINIC | Facility: IMMUNIZATION CENTER | Age: 46
End: 2021-01-31
Payer: COMMERCIAL

## 2021-01-31 DIAGNOSIS — Z23 ENCOUNTER FOR VACCINATION: Primary | ICD-10-CM

## 2021-02-03 ENCOUNTER — OFFICE VISIT (OUTPATIENT)
Dept: URGENT CARE | Facility: PHYSICIAN GROUP | Age: 46
End: 2021-02-03
Payer: COMMERCIAL

## 2021-02-03 VITALS
DIASTOLIC BLOOD PRESSURE: 78 MMHG | WEIGHT: 240 LBS | OXYGEN SATURATION: 95 % | SYSTOLIC BLOOD PRESSURE: 158 MMHG | RESPIRATION RATE: 14 BRPM | BODY MASS INDEX: 35.55 KG/M2 | TEMPERATURE: 98.7 F | HEIGHT: 69 IN | HEART RATE: 61 BPM

## 2021-02-03 DIAGNOSIS — R11.0 NAUSEA: ICD-10-CM

## 2021-02-03 DIAGNOSIS — R52 BODY ACHES: ICD-10-CM

## 2021-02-03 DIAGNOSIS — R51.9 NONINTRACTABLE HEADACHE, UNSPECIFIED CHRONICITY PATTERN, UNSPECIFIED HEADACHE TYPE: ICD-10-CM

## 2021-02-03 LAB — GLUCOSE BLD-MCNC: 116 MG/DL (ref 70–100)

## 2021-02-03 PROCEDURE — 99213 OFFICE O/P EST LOW 20 MIN: CPT | Performed by: PHYSICIAN ASSISTANT

## 2021-02-03 PROCEDURE — 82962 GLUCOSE BLOOD TEST: CPT | Performed by: PHYSICIAN ASSISTANT

## 2021-02-03 RX ORDER — ONDANSETRON 4 MG/1
4 TABLET, ORALLY DISINTEGRATING ORAL ONCE
Status: COMPLETED | OUTPATIENT
Start: 2021-02-03 | End: 2021-02-03

## 2021-02-03 RX ORDER — ONDANSETRON 4 MG/1
4 TABLET, FILM COATED ORAL EVERY 6 HOURS PRN
Qty: 20 TAB | Refills: 1 | Status: SHIPPED | OUTPATIENT
Start: 2021-02-03 | End: 2021-05-05

## 2021-02-03 RX ADMIN — ONDANSETRON 4 MG: 4 TABLET, ORALLY DISINTEGRATING ORAL at 18:11

## 2021-02-03 ASSESSMENT — ENCOUNTER SYMPTOMS
SHORTNESS OF BREATH: 0
ABDOMINAL PAIN: 0
WHEEZING: 0
SORE THROAT: 0
CHILLS: 0
BLOOD IN STOOL: 0
COUGH: 0
NAUSEA: 1
FEVER: 0
SPUTUM PRODUCTION: 0
VOMITING: 0
DIZZINESS: 1
HEADACHES: 1
DIARRHEA: 1

## 2021-02-03 ASSESSMENT — FIBROSIS 4 INDEX: FIB4 SCORE: 0.65

## 2021-02-03 NOTE — LETTER
February 3, 2021       Patient: Giuseppe Swift   YOB: 1975   Date of Visit: 2/3/2021         To Whom It May Concern:    In my medical opinion, I recommend that Giuseppe Swift should be excused from work for Thursday, Friday and Saturday (2/3, 2/4, 2/5) and permitted to return to normal duty thereafter.        If you have any questions or concerns, please don't hesitate to call [phone]          Sincerely,          Hector Culver P.A.-C.  Electronically Signed

## 2021-02-04 NOTE — PROGRESS NOTES
Subjective:   Giuseppe Swift  is a 45 y.o. male who presents for Nausea (nasuea, light headed, dizzy, dieareha, SOB, headache( 5x day))      Nausea  Associated symptoms include headaches and nausea. Pertinent negatives include no abdominal pain, chest pain, chills, congestion, coughing, fever, rash, sore throat or vomiting.   Patient comes clinic complaining of symptoms that began after receiving his second dose of modafinil vaccine 4 days ago.  He complains of generalized sensation of fatigue and dizziness.  He has had headache and feeling as though he needs to take deep breaths to to get adequate air.  He denies cough stridor or wheeze.  Denies sensation of swelling to throat.  Denies history of anaphylaxis or angioedema.  Denies reaction at site of injection/vaccination.  Has tried OTC Aleve and Tylenol with minimal improved symptoms.  Denies past medical history of reaction to vaccines.  Patient states he did feel achy and fatigued after the first injection but denies having had since.  He denies vomiting but notes nausea that is waxed and waned since receiving vaccine.  He reports a few episodes of diarrhea throughout as well.    Review of Systems   Constitutional: Negative for chills and fever.   HENT: Negative for congestion, ear pain and sore throat.    Respiratory: Negative for cough, sputum production, shortness of breath and wheezing.         Need to take deep breaths   Cardiovascular: Negative for chest pain and leg swelling.   Gastrointestinal: Positive for diarrhea and nausea. Negative for abdominal pain, blood in stool and vomiting.   Skin: Negative for rash.   Neurological: Positive for dizziness and headaches.       Allergies   Allergen Reactions   • Iodine Rash and Itching     Pt. States he broke out from IV contrast    • Latex Swelling     Pt. States he also gets blisters   • Cat Hair Extract Anxiety, Hives, Itching, Rash, Runny Nose, Shortness of Breath and Swelling   • Lisinopril       "Cough   • Pineapple    • Shellfish Allergy         Objective:   /78 (BP Location: Left arm, Patient Position: Sitting, BP Cuff Size: Adult)   Pulse 61   Temp 37.1 °C (98.7 °F) (Temporal)   Resp 14   Ht 1.753 m (5' 9\") Comment: per pt  Wt 109 kg (240 lb)   SpO2 95%   BMI 35.44 kg/m²     Physical Exam  Vitals signs and nursing note reviewed.   Constitutional:       General: He is not in acute distress.     Appearance: He is well-developed. He is not diaphoretic.   HENT:      Head: Normocephalic and atraumatic.      Right Ear: Tympanic membrane, ear canal and external ear normal.      Left Ear: Tympanic membrane, ear canal and external ear normal.      Nose: Nose normal.      Mouth/Throat:      Pharynx: Uvula midline. Posterior oropharyngeal erythema ( mild PND) present. No oropharyngeal exudate.      Tonsils: No tonsillar abscesses.   Eyes:      General: Lids are normal. No scleral icterus.        Right eye: No discharge.         Left eye: No discharge.      Conjunctiva/sclera: Conjunctivae normal.      Right eye: Right conjunctiva is not injected. No exudate or hemorrhage.     Left eye: Left conjunctiva is not injected. No exudate or hemorrhage.     Pupils: Pupils are equal, round, and reactive to light.   Neck:      Musculoskeletal: Neck supple.   Pulmonary:      Effort: Pulmonary effort is normal. No respiratory distress.      Breath sounds: No decreased breath sounds, wheezing, rhonchi or rales.   Musculoskeletal: Normal range of motion.   Lymphadenopathy:      Cervical: Cervical adenopathy ( mild bilat) present.   Skin:     General: Skin is warm and dry.      Coloration: Skin is not pale.   Neurological:      Mental Status: He is alert and oriented to person, place, and time. He is not disoriented.      GCS: GCS eye subscore is 4. GCS verbal subscore is 5. GCS motor subscore is 6.      Cranial Nerves: No cranial nerve deficit.      Sensory: No sensory deficit.      Coordination: Coordination normal. "      Gait: Gait normal.       Results for orders placed or performed in visit on 02/03/21   POCT Glucose   Result Value Ref Range    Glucose - Accu-Ck 116 (A) 70 - 100 mg/dL     Zofran 4 mg ODT-tolerates well    Assessment/Plan:   1. Nausea  - POCT Glucose  - ondansetron (ZOFRAN ODT) dispertab 4 mg    2. Body aches    3. Nonintractable headache, unspecified chronicity pattern, unspecified headache type  Supportive care is reviewed with patient/caregiver - recommend to continue with fluids, sent Rx Zofran, patient largely reassured by exam and normal vitals, discussed likely immune reaction  Return to clinic with lack of resolution or progression of symptoms.  ER precautions with any worsening symptoms are reviewed with patient/caregiver and they do express understanding    I have worn an N95 mask, gloves and eye protection for the entire encounter with this patient.     Differential diagnosis, natural history, supportive care, and indications for immediate follow-up discussed.

## 2021-02-09 ENCOUNTER — TELEPHONE (OUTPATIENT)
Dept: MEDICAL GROUP | Facility: PHYSICIAN GROUP | Age: 46
End: 2021-02-09

## 2021-02-09 NOTE — TELEPHONE ENCOUNTER
----- Message from Giuseppe Swift sent at 2/8/2021  6:39 PM PST -----  Regarding: Prescription Question  Contact: [phone]  Hi Dr Moyer,  I recently had a colonoscopy/endoscopy with results of internal hemorrhoids. Is there something I can use OTC/Rx for the swelling, itching, burning? For the last three days I've had some discomfort.

## 2021-02-16 ENCOUNTER — PRE-ADMISSION TESTING (OUTPATIENT)
Dept: ADMISSIONS | Facility: MEDICAL CENTER | Age: 46
End: 2021-02-16
Attending: INTERNAL MEDICINE
Payer: COMMERCIAL

## 2021-02-16 DIAGNOSIS — Z01.812 PRE-OPERATIVE LABORATORY EXAMINATION: ICD-10-CM

## 2021-02-16 LAB
ANION GAP SERPL CALC-SCNC: 12 MMOL/L (ref 7–16)
BUN SERPL-MCNC: 15 MG/DL (ref 8–22)
CALCIUM SERPL-MCNC: 9.3 MG/DL (ref 8.4–10.2)
CHLORIDE SERPL-SCNC: 102 MMOL/L (ref 96–112)
CO2 SERPL-SCNC: 24 MMOL/L (ref 20–33)
CREAT SERPL-MCNC: 0.85 MG/DL (ref 0.5–1.4)
GLUCOSE SERPL-MCNC: 88 MG/DL (ref 65–99)
POTASSIUM SERPL-SCNC: 4 MMOL/L (ref 3.6–5.5)
SODIUM SERPL-SCNC: 138 MMOL/L (ref 135–145)

## 2021-02-16 PROCEDURE — 36415 COLL VENOUS BLD VENIPUNCTURE: CPT

## 2021-02-16 PROCEDURE — U0005 INFEC AGEN DETEC AMPLI PROBE: HCPCS

## 2021-02-16 PROCEDURE — 80048 BASIC METABOLIC PNL TOTAL CA: CPT

## 2021-02-16 PROCEDURE — U0003 INFECTIOUS AGENT DETECTION BY NUCLEIC ACID (DNA OR RNA); SEVERE ACUTE RESPIRATORY SYNDROME CORONAVIRUS 2 (SARS-COV-2) (CORONAVIRUS DISEASE [COVID-19]), AMPLIFIED PROBE TECHNIQUE, MAKING USE OF HIGH THROUGHPUT TECHNOLOGIES AS DESCRIBED BY CMS-2020-01-R: HCPCS

## 2021-02-16 PROCEDURE — C9803 HOPD COVID-19 SPEC COLLECT: HCPCS

## 2021-02-16 ASSESSMENT — FIBROSIS 4 INDEX: FIB4 SCORE: 0.65

## 2021-02-16 NOTE — PREPROCEDURE INSTRUCTIONS
" Reviewed \"Preparing for your procedure\" verbally and copy given to pt. Also given \"fasting\", Pain management\", \"Patient Safety\", \"Speak-up\" handouts. Stated no further questions for surgeon on consent and stated procedure listed on consent.   "

## 2021-02-17 LAB
SARS-COV-2 RNA RESP QL NAA+PROBE: NOTDETECTED
SPECIMEN SOURCE: NORMAL

## 2021-02-22 ENCOUNTER — HOSPITAL ENCOUNTER (OUTPATIENT)
Facility: MEDICAL CENTER | Age: 46
End: 2021-02-22
Attending: INTERNAL MEDICINE | Admitting: INTERNAL MEDICINE
Payer: COMMERCIAL

## 2021-02-22 ENCOUNTER — ANESTHESIA (OUTPATIENT)
Dept: SURGERY | Facility: MEDICAL CENTER | Age: 46
End: 2021-02-22
Payer: COMMERCIAL

## 2021-02-22 ENCOUNTER — APPOINTMENT (OUTPATIENT)
Dept: RADIOLOGY | Facility: MEDICAL CENTER | Age: 46
End: 2021-02-22
Attending: INTERNAL MEDICINE
Payer: COMMERCIAL

## 2021-02-22 ENCOUNTER — ANESTHESIA EVENT (OUTPATIENT)
Dept: SURGERY | Facility: MEDICAL CENTER | Age: 46
End: 2021-02-22
Payer: COMMERCIAL

## 2021-02-22 VITALS
SYSTOLIC BLOOD PRESSURE: 153 MMHG | TEMPERATURE: 96.8 F | DIASTOLIC BLOOD PRESSURE: 89 MMHG | RESPIRATION RATE: 16 BRPM | HEART RATE: 59 BPM | BODY MASS INDEX: 35.56 KG/M2 | HEIGHT: 69 IN | OXYGEN SATURATION: 94 % | WEIGHT: 240.08 LBS

## 2021-02-22 DIAGNOSIS — E78.00 PURE HYPERCHOLESTEROLEMIA: ICD-10-CM

## 2021-02-22 LAB — EKG IMPRESSION: NORMAL

## 2021-02-22 PROCEDURE — 160046 HCHG PACU - 1ST 60 MINS PHASE II: Performed by: INTERNAL MEDICINE

## 2021-02-22 PROCEDURE — 160002 HCHG RECOVERY MINUTES (STAT): Performed by: INTERNAL MEDICINE

## 2021-02-22 PROCEDURE — 160009 HCHG ANES TIME/MIN: Performed by: INTERNAL MEDICINE

## 2021-02-22 PROCEDURE — 700105 HCHG RX REV CODE 258: Performed by: INTERNAL MEDICINE

## 2021-02-22 PROCEDURE — 160036 HCHG PACU - EA ADDL 30 MINS PHASE I: Performed by: INTERNAL MEDICINE

## 2021-02-22 PROCEDURE — 700111 HCHG RX REV CODE 636 W/ 250 OVERRIDE (IP): Performed by: ANESTHESIOLOGY

## 2021-02-22 PROCEDURE — 160203 HCHG ENDO MINUTES - 1ST 30 MINS LEVEL 4: Performed by: INTERNAL MEDICINE

## 2021-02-22 PROCEDURE — 160048 HCHG OR STATISTICAL LEVEL 1-5: Performed by: INTERNAL MEDICINE

## 2021-02-22 PROCEDURE — 93010 ELECTROCARDIOGRAM REPORT: CPT | Performed by: INTERNAL MEDICINE

## 2021-02-22 PROCEDURE — 700101 HCHG RX REV CODE 250: Performed by: ANESTHESIOLOGY

## 2021-02-22 PROCEDURE — 160035 HCHG PACU - 1ST 60 MINS PHASE I: Performed by: INTERNAL MEDICINE

## 2021-02-22 PROCEDURE — 160025 RECOVERY II MINUTES (STATS): Performed by: INTERNAL MEDICINE

## 2021-02-22 PROCEDURE — 160208 HCHG ENDO MINUTES - EA ADDL 1 MIN LEVEL 4: Performed by: INTERNAL MEDICINE

## 2021-02-22 PROCEDURE — 500066 HCHG BITE BLOCK, ECT: Performed by: INTERNAL MEDICINE

## 2021-02-22 PROCEDURE — 93005 ELECTROCARDIOGRAM TRACING: CPT | Performed by: INTERNAL MEDICINE

## 2021-02-22 RX ORDER — ONDANSETRON 2 MG/ML
INJECTION INTRAMUSCULAR; INTRAVENOUS PRN
Status: DISCONTINUED | OUTPATIENT
Start: 2021-02-22 | End: 2021-02-22 | Stop reason: SURG

## 2021-02-22 RX ORDER — LIDOCAINE HYDROCHLORIDE 20 MG/ML
INJECTION, SOLUTION EPIDURAL; INFILTRATION; INTRACAUDAL; PERINEURAL PRN
Status: DISCONTINUED | OUTPATIENT
Start: 2021-02-22 | End: 2021-02-22 | Stop reason: SURG

## 2021-02-22 RX ORDER — SODIUM CHLORIDE, SODIUM LACTATE, POTASSIUM CHLORIDE, CALCIUM CHLORIDE 600; 310; 30; 20 MG/100ML; MG/100ML; MG/100ML; MG/100ML
INJECTION, SOLUTION INTRAVENOUS CONTINUOUS
Status: DISCONTINUED | OUTPATIENT
Start: 2021-02-22 | End: 2021-02-22 | Stop reason: HOSPADM

## 2021-02-22 RX ORDER — SUCCINYLCHOLINE/SOD CL,ISO/PF 200MG/10ML
SYRINGE (ML) INTRAVENOUS PRN
Status: DISCONTINUED | OUTPATIENT
Start: 2021-02-22 | End: 2021-02-22 | Stop reason: SURG

## 2021-02-22 RX ORDER — ATORVASTATIN CALCIUM 80 MG/1
80 TABLET, FILM COATED ORAL EVERY EVENING
Qty: 90 TABLET | Refills: 3 | Status: SHIPPED | OUTPATIENT
Start: 2021-02-22 | End: 2021-10-22 | Stop reason: SDUPTHER

## 2021-02-22 RX ORDER — DIPHENHYDRAMINE HYDROCHLORIDE 50 MG/ML
12.5 INJECTION INTRAMUSCULAR; INTRAVENOUS
Status: DISCONTINUED | OUTPATIENT
Start: 2021-02-22 | End: 2021-02-22 | Stop reason: HOSPADM

## 2021-02-22 RX ORDER — ONDANSETRON 2 MG/ML
4 INJECTION INTRAMUSCULAR; INTRAVENOUS
Status: DISCONTINUED | OUTPATIENT
Start: 2021-02-22 | End: 2021-02-22 | Stop reason: HOSPADM

## 2021-02-22 RX ORDER — HALOPERIDOL 5 MG/ML
1 INJECTION INTRAMUSCULAR
Status: DISCONTINUED | OUTPATIENT
Start: 2021-02-22 | End: 2021-02-22 | Stop reason: HOSPADM

## 2021-02-22 RX ADMIN — LIDOCAINE HYDROCHLORIDE 50 MG: 20 INJECTION, SOLUTION EPIDURAL; INFILTRATION; INTRACAUDAL; PERINEURAL at 10:10

## 2021-02-22 RX ADMIN — PROPOFOL 200 MG: 10 INJECTION, EMULSION INTRAVENOUS at 10:10

## 2021-02-22 RX ADMIN — ONDANSETRON 4 MG: 2 INJECTION INTRAMUSCULAR; INTRAVENOUS at 10:10

## 2021-02-22 RX ADMIN — SODIUM CHLORIDE, POTASSIUM CHLORIDE, SODIUM LACTATE AND CALCIUM CHLORIDE: 600; 310; 30; 20 INJECTION, SOLUTION INTRAVENOUS at 08:44

## 2021-02-22 RX ADMIN — Medication 100 MG: at 10:10

## 2021-02-22 ASSESSMENT — FIBROSIS 4 INDEX: FIB4 SCORE: 0.65

## 2021-02-22 ASSESSMENT — PAIN SCALES - GENERAL: PAIN_LEVEL: 0

## 2021-02-22 NOTE — OR NURSING
Pt allergies and NPO status verified, home meds reconcilled. Belongings secured. Pt verbalizes understanding of the pain scale, expected course of stay, and plan of care.  Procedure verified with pt.  IV access established.  ECG completed and placed in chart.

## 2021-02-22 NOTE — PROGRESS NOTES
Indication: Duodenal lesion.   Risks, benefits, and alternatives were discussed with consenting person(s). Consenting person(s) were given an opportunity to ask questions and discuss other options. Risks including but not limited to failed or incomplete EGD (with possible side viewing endoscope)/EUS/FNA/EMR, ineffective therapy, pancreatitis (with potential future complications), perforation, infection, bleeding, missed lesion(s), missed diagnosis, cardiac and/or pulmonary event, aspiration, stroke, possible need for surgery, hospitalization possibly prolonged, discomfort, unsuccessful and/or incomplete procedure, possible need for repeat procedures and/or additional testings, damage to adjacent organs and/or vascular structures, medication reaction, disability, death, and other adverse events possibly life-threatening. Discussion was undertaken with Layman's terms. Consenting persons stated understanding and acceptance of these risks, and wished to proceed. Consent was given in clear state of mind.

## 2021-02-22 NOTE — DISCHARGE INSTRUCTIONS
ENDOSCOPY HOME CARE INSTRUCTIONS    GASTROSCOPY OR ERCP  1. Don't eat or drink anything for about an hour after the test. You can then resume your regular diet.  2. Don't drive or drink alcohol for 24 hours. The medication you received will make you too drowsy.  3. Don't take any coffee, tea, or aspirin products until after you see your doctor. These can harm the lining of your stomach.  4. If you begin to vomit bloody material, or develop black or bloody stools, call your doctor as soon as possible.  5. If you have any neck, chest, abdominal pain or temp of 100 degrees, call your doctor.    Recommendations:   1.  Follow-up with referring physician    You should call 911 if you develop problems with breathing or chest pain.  If any questions arise, call your doctor. If your doctor is not available, please feel free to call [phone].   You can also call the [facility] HOTLINE open 24 hours/day, 7 days/week and speak to a nurse at [phone], or toll free [phone].      Discharge Education for patients on BALTAZAR (Obstructive Sleep Apnea) Protocol    Prior to receiving sedation or anesthesia, we screen all patients for Obstructive Sleep Apnea.  During your screening, you were identified as having suspected, but not confirmed Obstructive Sleep Apnea(BALTAZAR).    What is Obstructive Sleep Apnea?  Sleep apnea (AP-ne-ah) is a common disorder which involves breathing pauses that occur during sleep.  These can last from 10 seconds to a minute or longer.  Normal breathing resumes often with a loud snort or choking sound.    Sleep apnea occurs in all age groups and both genders but is more common in men and people over 40 years of age.  It has been estimated that as many as 18 million Americans have sleep apnea.  Most people who have sleep apnea don’t know they have it because it only occurs during sleep.  A family member and/or bed partner may first notice the signs of sleep apnea.  Sleep apnea is a chronic (ongoing)  condition that disrupts the quality and quantity of your sleep repeatedly throughout the night.  This often results in excessive daytime sleepiness or fatigue during the day.  It may also contribute to high blood pressure, heart problems, and complications following medications used for surgery and procedures.    To establish a definitive diagnosis, further testing from a specialist would be needed.  We recommend that you follow up with your primary care physician.    We recommend that you should be with an adult observer for at least 24 hours after your sedation/anesthesia.  If you have a CPAP machine, you should wear it during any sleep period (day or night) for the week following your procedure.  We encourage you to sleep on your side or in a sitting position, even with napping.  Lying flat on your back increases the risk of apnea and airway obstruction during your post procedure recovery period.    It is important to prevent over-sedation that could increase your risk for apnea.  Please take all pain medication as directed by your physician.  If you are not getting pain relief, please contact your physician to discuss possible approaches to relieving pain while minimizing medications that can affect your breathing and oxygen levels.    Depression / Suicide Risk    As you are discharged from this Critical access hospital facility, it is important to learn how to keep safe from harming yourself.    Recognize the warning signs:  · Abrupt changes in personality, positive or negative- including increase in energy   · Giving away possessions  · Change in eating patterns- significant weight changes-  positive or negative  · Change in sleeping patterns- unable to sleep or sleeping all the time   · Unwillingness or inability to communicate  · Depression  · Unusual sadness, discouragement and loneliness  · Talk of wanting to die  · Neglect of personal appearance   · Rebelliousness- reckless behavior  · Withdrawal from  people/activities they love  · Confusion- inability to concentrate     If you or a loved one observes any of these behaviors or has concerns about self-harm, here's what you can do:  · Talk about it- your feelings and reasons for harming yourself  · Remove any means that you might use to hurt yourself (examples: pills, rope, extension cords, firearm)  · Get professional help from the community (Mental Health, Substance Abuse, psychological counseling)  · Do not be alone:Call your Safe Contact- someone whom you trust who will be there for you.  · Call your local CRISIS HOTLINE 634-9643 or 454-264-1922  · Call your local Children's Mobile Crisis Response Team [place] (060) 389-5144 or www.[employer]  · Call the toll free National Suicide Prevention Hotlines   · National Suicide Prevention Lifeline 672-484-ZTWC (2336)  · National Hope Line Network 800-SUICIDE (989-5577)    I acknowledge receipt and understanding of these Home Care Instructions.

## 2021-02-22 NOTE — ANESTHESIA POSTPROCEDURE EVALUATION
Patient: Giuseppe Swift    Procedure Summary     Date: 02/22/21 Room / Location:  ENDOSCOPIC ULTRASOUND ROOM / SURGERY [facility]    Anesthesia Start: 1010 Anesthesia Stop:     Procedures:       GASTROSCOPY      EGD, WITH ENDOSCOPIC US - UPPER RADIAL LINEAR      EGD, WITH ASPIRATION BIOPSY - POSSIBLE Diagnosis:       Disorder of duodenum      (DISORDER OF DUODENUM)    Surgeons: Jcarlos Thomas M.D. Responsible Provider: Justin Lincoln M.D.    Anesthesia Type: general ASA Status: 3          Final Anesthesia Type: general  Last vitals  BP   Blood Pressure: (P) 149/84    Temp   (P) 36.2 °C (97.2 °F)    Pulse   (P) 70   Resp   (P) 16    SpO2   (P) 99 %      Anesthesia Post Evaluation    Patient location during evaluation: PACU  Patient participation: complete - patient participated  Level of consciousness: awake and alert  Pain score: 0    Airway patency: patent  Anesthetic complications: no  Cardiovascular status: hemodynamically stable  Respiratory status: acceptable  Hydration status: euvolemic    PONV: none          No complications documented.     Nurse Pain Score: 0 (NPRS)

## 2021-02-22 NOTE — OR NURSING
1100 To PACU from endo via vonnie s/p gastroscopy. Pt sleeping, respirations spontaneous and non-labored via OPA. Abdomen soft.    1109 Pt rouses to verbal stimuli, OPA removed.    1115 Pt resting quietly with his eyes closed.    1130 No changes. Dr Thomas at the bedside to discuss the procedure.    1140 Pt's significant other updated.    1145 No changes.    1200 No changes. Report to discharge REDD Burroughs.

## 2021-02-22 NOTE — ANESTHESIA PREPROCEDURE EVALUATION
Relevant Problems   CARDIAC   (+) Coronary artery disease involving native coronary artery of native heart without angina pectoris      GI   (+) GERD (gastroesophageal reflux disease)      ENDO   (+) Subclinical hypothyroidism       Physical Exam    Airway   Mallampati: II  TM distance: >3 FB  Neck ROM: full       Cardiovascular - normal exam  Rhythm: regular  Rate: normal  (-) murmur     Dental - normal exam           Pulmonary - normal exam  Breath sounds clear to auscultation     Abdominal    Neurological - normal exam                 Anesthesia Plan    ASA 3   ASA physical status 3 criteria: CAD/stents (> 3 months)    Plan - general       Airway plan will be ETT          Induction: intravenous    Postoperative Plan: Postoperative administration of opioids is intended.    Pertinent diagnostic labs and testing reviewed    Informed Consent:    Anesthetic plan and risks discussed with patient.    Use of blood products discussed with: patient whom consented to blood products.

## 2021-02-22 NOTE — PROCEDURES
Echoendoscope/Esophagogastroduodenoscopy  Date of Procedure: 2/22/2021  Attending Physician: Jcarlos Thomas MD    Indications:  Abnormal duodenal nodularity  Instrument: Olympus Echoendoscope  Sedation:   Anesthesiologist/Type of Anesthesia:  Anesthesiologist: Justin Lincoln M.D./General  Surgical Staff:  Circulator: Zackery Crump R.N.  Endoscopy Technician: Adrienne Jaramillo      Pre-Anesthesia Assessment:  Prior to the procedure, a History and Physical was performed, and patient medications and allergies were reviewed. The patient’s tolerance of previous anesthesia was also reviewed. The risks and benefits of the procedure and the sedation options and risks were discussed with the patient including but not limited to infection, bleeding, aspiration, perforation, adverse medication reaction, missed diagnosis,  pancreatitis, and missed lesions. The patient verbalized understanding. All questions were answered, and informed consent was obtained.     After I obtained informed consent from the patient, the patient was placed in the left lateral position. Appropriate time-out protocol was followed: the correct patient, the correct procedure, and the correct equipment in the room were confirmed. Throughout the procedure, the patient’s blood pressure, pulse, and oxygen saturations were monitored continuously. The echoendoscope was inserted through the oropharynx, esophagus intubated, then advanced to the gastrointestinal tract.  Findings and interventions were performed and documented below. Air was then withdrawn and the echoendoscope was removed. The patient tolerated the procedure well. There were no immediate postoperative complications.     Findings:    EGD:  Esophagus: Proximal esophagus appeared normal with finding of atopic tissue consistent with known inlet patch.  Mid and distal esophagus appeared normal.  GE junction at 40 cm appears normal  Stomach: Mild antral erythema.   Scattered fundic gland polyps noted.  Retroflexion otherwise normal  Duodenum: First and second portion duodenum appeared normal.  Major papilla appeared normal.  Bile was noted extruding from the ampullary orifice.  Slow pull-through of the duodenal sweep suggest a slight elevation of the nodularity.  Due to difficulty in viewing this area a side-viewing duodena scope was inserted and passed to the second portion of duodenum.  Slow withdraw/advancement with close examination of the sleep demonstrated a prominence consistent with minor papilla.  Multiple sweeps were performed    EUS (with radial and linear):  Endoscopic exam with the side viewing echoendoscope was normal. The major papilla was normal endoscopically and sonographically.    The bile duct was non-dilated and measured 3 mm in maximal diameter. The gallbladder was normal. There were no stones or sludge.    The pancreatic parenchyma appeared normal. There were no features of chronic pancreatitis. No masses, cysts or stones were visualized in the pancreatic parenchyma.  Multiple sweeps were performed with both the radial and linear echoendoscope with inability to trace the pancreatic duct to the major papilla.  Dorsal duct was noted suggesting pancreatic divisum anatomy.  The duct was able to traced to the duodenal wall. the pancreatic duct measured 1 mm in the head, 1mm in the body of the pancreas.    No lymph nodes were seen in the upper abdomen and mediastinum.    No masses were seen in the visualized portions of the liver.    .  Impressions:   1.  Duodenal nodularity consistent with minor papilla  2.  Suspected pancreatic divisum anatomy as pancreatic duct was not able to be located or identified at the major papilla    Recommendations:   1.  Follow-up with referring physician         This note was generated using voice recognition software which has a small chance of producing errors of grammar and possibly content. I have made every reasonable  attempt to find and correct any obvious errors, but expect that some may not be found prior to finalization of this note

## 2021-02-22 NOTE — ANESTHESIA TIME REPORT
Anesthesia Start and Stop Event Times     Date Time Event    2/22/2021 0843 Ready for Procedure     1010 Anesthesia Start     1102 Anesthesia Stop        Responsible Staff  02/22/21    Name Role Begin End    Justin Lincoln M.D. Anesth 1010 1102        Preop Diagnosis (Free Text):  Pre-op Diagnosis     DISORDER OF DUODENUM        Preop Diagnosis (Codes):  Diagnosis Information     Diagnosis Code(s): Disorder of duodenum [K31.9]        Post op Diagnosis  Disorder of duodenum      Premium Reason  Non-Premium    Comments:

## 2021-05-05 ENCOUNTER — HOSPITAL ENCOUNTER (OUTPATIENT)
Dept: RADIOLOGY | Facility: MEDICAL CENTER | Age: 46
End: 2021-05-05
Attending: PHYSICIAN ASSISTANT
Payer: COMMERCIAL

## 2021-05-05 ENCOUNTER — OFFICE VISIT (OUTPATIENT)
Dept: URGENT CARE | Facility: PHYSICIAN GROUP | Age: 46
End: 2021-05-05
Payer: COMMERCIAL

## 2021-05-05 VITALS
RESPIRATION RATE: 18 BRPM | WEIGHT: 242 LBS | TEMPERATURE: 97.2 F | BODY MASS INDEX: 35.84 KG/M2 | DIASTOLIC BLOOD PRESSURE: 92 MMHG | HEART RATE: 66 BPM | HEIGHT: 69 IN | OXYGEN SATURATION: 95 % | SYSTOLIC BLOOD PRESSURE: 142 MMHG

## 2021-05-05 DIAGNOSIS — S80.02XA CONTUSION OF LEFT KNEE, INITIAL ENCOUNTER: ICD-10-CM

## 2021-05-05 DIAGNOSIS — S89.92XA INJURY OF LEFT KNEE, INITIAL ENCOUNTER: ICD-10-CM

## 2021-05-05 PROCEDURE — 99213 OFFICE O/P EST LOW 20 MIN: CPT | Performed by: PHYSICIAN ASSISTANT

## 2021-05-05 PROCEDURE — 73562 X-RAY EXAM OF KNEE 3: CPT | Mod: LT

## 2021-05-05 ASSESSMENT — FIBROSIS 4 INDEX: FIB4 SCORE: 0.65

## 2021-05-05 ASSESSMENT — ENCOUNTER SYMPTOMS
CHILLS: 0
DIARRHEA: 0
FEVER: 0
NAUSEA: 0
SHORTNESS OF BREATH: 0
DIZZINESS: 0
ABDOMINAL PAIN: 0
VOMITING: 0

## 2021-05-06 NOTE — PROGRESS NOTES
"Subjective:      Giuseppe Swift is a 45 y.o. male who presents with Knee Pain (L knee)            Knee Pain  This is a new problem. The current episode started 1 to 4 weeks ago (2 weeks). Pertinent negatives include no abdominal pain, chest pain, chills, congestion, fever, nausea, rash or vomiting. The symptoms are aggravated by walking. He has tried acetaminophen for the symptoms.     Patient presents to urgent care reporting a 2 week history of left lateral knee pain starting after a fall directly on the knee while playing soccer. Pain is worsened with walking or standing and radiates down the lateral side of his calf.       Review of Systems   Constitutional: Negative for chills and fever.   HENT: Negative for congestion.    Respiratory: Negative for shortness of breath.    Cardiovascular: Negative for chest pain.   Gastrointestinal: Negative for abdominal pain, diarrhea, nausea and vomiting.   Genitourinary: Negative.    Musculoskeletal:        + knee pain   Skin: Negative for rash.   Neurological: Negative for dizziness.        Objective:     /92 (BP Location: Right arm, Patient Position: Standing, BP Cuff Size: Large adult)   Pulse 66   Temp 36.2 °C (97.2 °F) (Temporal)   Resp 18   Ht 1.753 m (5' 9\")   Wt 110 kg (242 lb)   SpO2 95%   BMI 35.74 kg/m²      Physical Exam  Vitals and nursing note reviewed.   Constitutional:       Appearance: Normal appearance. He is well-developed.   HENT:      Head: Normocephalic and atraumatic.      Right Ear: External ear normal.      Left Ear: External ear normal.      Mouth/Throat:      Mouth: Mucous membranes are moist.   Eyes:      Conjunctiva/sclera: Conjunctivae normal.   Cardiovascular:      Rate and Rhythm: Normal rate and regular rhythm.   Pulmonary:      Effort: Pulmonary effort is normal.   Musculoskeletal:         General: Normal range of motion.      Cervical back: Normal range of motion.      Left knee: Bony tenderness present. No swelling, " effusion or ecchymosis. Tenderness present over the lateral joint line.   Skin:     General: Skin is warm and dry.   Neurological:      Mental Status: He is alert and oriented to person, place, and time.   Psychiatric:         Behavior: Behavior normal.          PMH:  has a past medical history of Bowel habit changes (02/2021), Glaucoma, Heart burn, High cholesterol, Hypertension, Myocardial infarct (HCC), Snoring, and Tuberculosis (2008).  MEDS:   Current Outpatient Medications:   •  atorvastatin (LIPITOR) 80 MG tablet, Take 1 tablet by mouth every evening., Disp: 90 tablet, Rfl: 3  •  acyclovir (ZOVIRAX) 400 MG tablet, Take 1 Tab by mouth 2 times a day., Disp: 90 Tab, Rfl: 3  •  losartan (COZAAR) 50 MG Tab, Take 1 Tab by mouth every day., Disp: 90 Tab, Rfl: 3  •  lansoprazole (PREVACID) 30 MG CAPSULE DELAYED RELEASE, Take 1 Cap by mouth every day. (Patient taking differently: Take 30 mg by mouth 2 Times a Day.), Disp: 90 Cap, Rfl: 3  •  metoprolol SR (TOPROL XL) 25 MG TABLET SR 24 HR, Take 0.5 Tabs by mouth every day., Disp: 50 Tab, Rfl: 3  •  Multiple Vitamins-Minerals (MULTIVITAMIN ADULT PO), Take  by mouth., Disp: , Rfl:   •  ADULT ASPIRIN LOW STRENGTH PO, Take 81 mg by mouth., Disp: , Rfl:   •  fexofenadine (ALLEGRA) 180 MG tablet, Take 180 mg by mouth every day., Disp: , Rfl:   ALLERGIES:   Allergies   Allergen Reactions   • Iodine Rash and Itching     Pt. States he broke out from IV contrast    • Latex Swelling     Pt. States he also gets blisters   • Cat Hair Extract Anxiety, Hives, Itching, Rash, Runny Nose, Shortness of Breath and Swelling   • Lisinopril      Cough   • Pineapple    • Shellfish Allergy      SURGHX:   Past Surgical History:   Procedure Laterality Date   • PB UPPER GI ENDOSCOPY,DIAGNOSIS  2/22/2021    Procedure: GASTROSCOPY;  Surgeon: Jcarlos Thomas M.D.;  Location: SURGERY [facility];  Service: EUS   • EGD W/ENDOSCOPIC ULTRASOUND  2/22/2021    Procedure: EGD, WITH ENDOSCOPIC US - UPPER  RADIAL LINEAR;  Surgeon: Jcarlos Thomas M.D.;  Location: SURGERY [facility];  Service: EUS   • VASECTOMY  2007   • LUMBAR FUSION POSTERIOR  2004    L4-S1     SOCHX:  reports that he has never smoked. He has never used smokeless tobacco. He reports current alcohol use. He reports that he does not use drugs.  FH: family history includes Diabetes in his mother; No Known Problems in his father.       Assessment/Plan:        1. Contusion of left knee, initial encounter    - DX-KNEE 3 VIEWS LEFT; Future        IMPRESSION:     No evidence of acute fracture or dislocation.      Patient's knee wrapped with ACE bandage at today's visit. Encouraged icing 2-3 times daily and use of OTC tylenol as needed for pain. Discussed use of topical voltaren gel. Monitor symptoms closely and seek medical attention if symptoms persist/worsen. The patient demonstrated a good understanding and agreed with the treatment plan.

## 2021-05-25 DIAGNOSIS — K21.9 GASTROESOPHAGEAL REFLUX DISEASE: ICD-10-CM

## 2021-05-25 RX ORDER — LANSOPRAZOLE 30 MG/1
30 CAPSULE, DELAYED RELEASE ORAL DAILY
Qty: 90 CAPSULE | Refills: 3 | Status: SHIPPED | OUTPATIENT
Start: 2021-05-25 | End: 2022-07-12 | Stop reason: SDUPTHER

## 2021-05-25 RX ORDER — ACYCLOVIR 400 MG/1
400 TABLET ORAL 2 TIMES DAILY
Qty: 90 TABLET | Refills: 3 | Status: SHIPPED | OUTPATIENT
Start: 2021-05-25 | End: 2022-04-11

## 2021-05-28 DIAGNOSIS — R03.0 PRE-HYPERTENSION: ICD-10-CM

## 2021-06-07 RX ORDER — METOPROLOL SUCCINATE 25 MG/1
12.5 TABLET, EXTENDED RELEASE ORAL DAILY
Qty: 45 TABLET | Refills: 0 | Status: SHIPPED | OUTPATIENT
Start: 2021-06-07 | End: 2021-09-07 | Stop reason: SDUPTHER

## 2021-09-07 DIAGNOSIS — R03.0 PRE-HYPERTENSION: ICD-10-CM

## 2021-09-07 RX ORDER — METOPROLOL SUCCINATE 25 MG/1
12.5 TABLET, EXTENDED RELEASE ORAL DAILY
Qty: 45 TABLET | Refills: 0 | Status: SHIPPED | OUTPATIENT
Start: 2021-09-07 | End: 2021-10-22 | Stop reason: SDUPTHER

## 2021-09-07 RX ORDER — LOSARTAN POTASSIUM 50 MG/1
50 TABLET ORAL DAILY
Qty: 90 TABLET | Refills: 0 | Status: SHIPPED | OUTPATIENT
Start: 2021-09-07 | End: 2021-10-22 | Stop reason: SDUPTHER

## 2021-09-21 ENCOUNTER — OFFICE VISIT (OUTPATIENT)
Dept: URGENT CARE | Facility: CLINIC | Age: 46
End: 2021-09-21
Payer: COMMERCIAL

## 2021-09-21 ENCOUNTER — HOSPITAL ENCOUNTER (OUTPATIENT)
Facility: MEDICAL CENTER | Age: 46
End: 2021-09-21
Attending: PHYSICIAN ASSISTANT
Payer: COMMERCIAL

## 2021-09-21 VITALS
BODY MASS INDEX: 36.58 KG/M2 | WEIGHT: 247 LBS | DIASTOLIC BLOOD PRESSURE: 90 MMHG | HEIGHT: 69 IN | OXYGEN SATURATION: 97 % | RESPIRATION RATE: 18 BRPM | SYSTOLIC BLOOD PRESSURE: 150 MMHG | HEART RATE: 68 BPM | TEMPERATURE: 97 F

## 2021-09-21 DIAGNOSIS — J06.9 VIRAL URI: ICD-10-CM

## 2021-09-21 PROCEDURE — U0005 INFEC AGEN DETEC AMPLI PROBE: HCPCS

## 2021-09-21 PROCEDURE — U0003 INFECTIOUS AGENT DETECTION BY NUCLEIC ACID (DNA OR RNA); SEVERE ACUTE RESPIRATORY SYNDROME CORONAVIRUS 2 (SARS-COV-2) (CORONAVIRUS DISEASE [COVID-19]), AMPLIFIED PROBE TECHNIQUE, MAKING USE OF HIGH THROUGHPUT TECHNOLOGIES AS DESCRIBED BY CMS-2020-01-R: HCPCS

## 2021-09-21 PROCEDURE — 99213 OFFICE O/P EST LOW 20 MIN: CPT | Performed by: PHYSICIAN ASSISTANT

## 2021-09-21 RX ORDER — PHENOL 1.4 %
AEROSOL, SPRAY (ML) MUCOUS MEMBRANE
COMMUNITY
End: 2022-04-11

## 2021-09-21 ASSESSMENT — FIBROSIS 4 INDEX: FIB4 SCORE: 0.66

## 2021-09-22 DIAGNOSIS — J06.9 VIRAL URI: ICD-10-CM

## 2021-09-22 LAB — COVID ORDER STATUS COVID19: NORMAL

## 2021-09-22 ASSESSMENT — ENCOUNTER SYMPTOMS
SORE THROAT: 0
HEADACHES: 1
SPUTUM PRODUCTION: 0
DIZZINESS: 0
FEVER: 0
MYALGIAS: 1
DIARRHEA: 0
CHILLS: 0
NAUSEA: 1
SHORTNESS OF BREATH: 1
WHEEZING: 0
HEMOPTYSIS: 0
RHINORRHEA: 1
VOMITING: 0
COUGH: 1
ABDOMINAL PAIN: 0
SINUS PAIN: 0

## 2021-09-22 NOTE — PROGRESS NOTES
"Subjective     Giuseppe Swift is a 46 y.o. male who presents with Shortness of Breath (SOB, nausea, dizzy, headache, burning in nasal,post nasal drip, body aches, fatigue, metalic taste, x 2 days )            URI   This is a new problem. Episode onset: 2 days. The problem has been unchanged. There has been no fever. Associated symptoms include congestion, coughing (mild, non-productive ), headaches, nausea and rhinorrhea. Pertinent negatives include no abdominal pain, chest pain, diarrhea, ear pain, rash, sinus pain, sore throat, vomiting or wheezing. Associated symptoms comments: Mild shortness of breath. He has tried nothing for the symptoms.     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 He is exposed to COVID patients every day at work.   He is fully vaccinated.     Past Medical History:   Diagnosis Date   • Bowel habit changes 02/2021    constipation and diarrhea   • Glaucoma     L eye   • Heart burn    • High cholesterol    • Hypertension    • Myocardial infarct (HCC)    • Snoring    • Tuberculosis 2008    no treatment, \"not active\"       Past Surgical History:   Procedure Laterality Date   • PB UPPER GI ENDOSCOPY,DIAGNOSIS  2/22/2021    Procedure: GASTROSCOPY;  Surgeon: Jcarlos Thomas M.D.;  Location: SURGERY [facility];  Service: EUS   • EGD W/ENDOSCOPIC ULTRASOUND  2/22/2021    Procedure: EGD, WITH ENDOSCOPIC US - UPPER RADIAL LINEAR;  Surgeon: Jcarlos Thomas M.D.;  Location: [facility];  Service: EUS   • VASECTOMY  2007   • LUMBAR FUSION POSTERIOR  2004    L4-S1       Family History   Problem Relation Age of Onset   • Diabetes Mother    • No Known Problems Father        Allergies   Allergen Reactions   • Iodine Rash and Itching     Pt. States he broke out from IV contrast    • Latex Swelling     Pt. States he also gets blisters   • Cat Hair Extract Anxiety, Hives, Itching, Rash, Runny Nose, Shortness of Breath and Swelling   • Lisinopril      Cough   • Pineapple    • " "Shellfish Allergy        .Medications, Allergies, and current problem list reviewed today in Epic      Review of Systems   Constitutional: Positive for malaise/fatigue. Negative for chills and fever.   HENT: Positive for congestion and rhinorrhea. Negative for ear pain, sinus pain and sore throat.    Respiratory: Positive for cough (mild, non-productive ) and shortness of breath. Negative for hemoptysis, sputum production and wheezing.    Cardiovascular: Negative for chest pain and leg swelling.   Gastrointestinal: Positive for nausea. Negative for abdominal pain, diarrhea and vomiting.   Musculoskeletal: Positive for myalgias.   Skin: Negative for rash.   Neurological: Positive for headaches. Negative for dizziness.        Metallic taste in mouth          All other systems reviewed and are negative.     Objective     /90 (BP Location: Left arm, Patient Position: Sitting, BP Cuff Size: Adult)   Pulse 68   Temp 36.1 °C (97 °F)   Resp 18   Ht 1.753 m (5' 9\")   Wt 112 kg (247 lb)   SpO2 97%   BMI 36.48 kg/m²      Physical Exam  Constitutional:       General: He is not in acute distress.     Appearance: He is not ill-appearing or diaphoretic.   HENT:      Head: Normocephalic and atraumatic.      Mouth/Throat:      Mouth: Mucous membranes are moist.      Pharynx: No posterior oropharyngeal erythema.   Eyes:      Conjunctiva/sclera: Conjunctivae normal.   Cardiovascular:      Rate and Rhythm: Normal rate and regular rhythm.      Heart sounds: Normal heart sounds.   Pulmonary:      Effort: Pulmonary effort is normal. No respiratory distress.      Breath sounds: Normal breath sounds. No stridor. No wheezing, rhonchi or rales.   Skin:     General: Skin is warm and dry.   Neurological:      General: No focal deficit present.      Mental Status: He is alert and oriented to person, place, and time.   Psychiatric:         Mood and Affect: Mood normal.         Behavior: Behavior normal.         Thought Content: " Thought content normal.         Judgment: Judgment normal.                             Assessment & Plan        1. Viral URI    - SARS-CoV-2 PCR (24 hour In-House): Collect NP swab in VTM; Future    COVID test pending  Isolation guidelines, conservative measures and ER precautions discussed.   COVID handout given.    Differential diagnoses, Supportive care, and indications for immediate follow-up discussed with patient.   Pathogenesis of diagnosis discussed including typical length and natural progression.   Instructed to return to clinic or nearest emergency department for any change in condition, further concerns, or worsening of symptoms.    The patient demonstrated a good understanding and agreed with the treatment plan.    Apple Wall P.A.-C.

## 2021-09-23 LAB
SARS-COV-2 RNA RESP QL NAA+PROBE: NOTDETECTED
SPECIMEN SOURCE: NORMAL

## 2021-09-23 NOTE — PATIENT COMMUNICATION
Notified pt. of NEGATIVE COVID-19 test results.    With respect to COVID-19 monitoring, the patient is cleared to return to work on next scheduled shift.

## 2021-09-24 ENCOUNTER — HOSPITAL ENCOUNTER (OUTPATIENT)
Dept: RADIOLOGY | Facility: MEDICAL CENTER | Age: 46
End: 2021-09-24
Attending: PHYSICIAN ASSISTANT
Payer: COMMERCIAL

## 2021-09-24 ENCOUNTER — HOSPITAL ENCOUNTER (OUTPATIENT)
Facility: MEDICAL CENTER | Age: 46
End: 2021-09-24
Attending: PHYSICIAN ASSISTANT
Payer: COMMERCIAL

## 2021-09-24 ENCOUNTER — OFFICE VISIT (OUTPATIENT)
Dept: URGENT CARE | Facility: PHYSICIAN GROUP | Age: 46
End: 2021-09-24
Payer: COMMERCIAL

## 2021-09-24 VITALS
SYSTOLIC BLOOD PRESSURE: 138 MMHG | DIASTOLIC BLOOD PRESSURE: 78 MMHG | HEIGHT: 69 IN | WEIGHT: 244 LBS | OXYGEN SATURATION: 97 % | HEART RATE: 58 BPM | BODY MASS INDEX: 36.14 KG/M2 | RESPIRATION RATE: 20 BRPM | TEMPERATURE: 98.4 F

## 2021-09-24 DIAGNOSIS — B34.9 NONSPECIFIC SYNDROME SUGGESTIVE OF VIRAL ILLNESS: ICD-10-CM

## 2021-09-24 DIAGNOSIS — J00 ACUTE RHINITIS: ICD-10-CM

## 2021-09-24 DIAGNOSIS — R06.02 SHORTNESS OF BREATH: ICD-10-CM

## 2021-09-24 DIAGNOSIS — I25.2 HISTORY OF MI (MYOCARDIAL INFARCTION): ICD-10-CM

## 2021-09-24 PROCEDURE — 0240U HCHG SARS-COV-2 COVID-19 NFCT DS RESP RNA 3 TRGT MIC: CPT

## 2021-09-24 PROCEDURE — 99214 OFFICE O/P EST MOD 30 MIN: CPT | Performed by: PHYSICIAN ASSISTANT

## 2021-09-24 PROCEDURE — 71046 X-RAY EXAM CHEST 2 VIEWS: CPT

## 2021-09-24 PROCEDURE — 93000 ELECTROCARDIOGRAM COMPLETE: CPT | Performed by: PHYSICIAN ASSISTANT

## 2021-09-24 ASSESSMENT — FIBROSIS 4 INDEX: FIB4 SCORE: 0.66

## 2021-09-24 NOTE — PROGRESS NOTES
Subjective:   Giuseppe Swift is a 46 y.o. 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  Today he presents for nasal congestion, fatigue, headaches, body aches, and mild nausea x1 week.  He is also having some mild intermittent shortness of breath with increased activity.  His oral intake has been slightly depressed due to lack of appetite and nausea.  He has some Zofran at home, but has not tried taking this medication.  He is not having any chest pain, pain with breathing, difficulty breathing, productive cough, fevers, abdominal pain, diarrhea, hematochezia, melena.  Was tested for COVID-19 earlier this week-she was symptomatic for approximately 48 hours at the time of testing.  No known flu exposure.  Past medical history significant for MI with stent placement.  He is taking his antihypertensives as prescribed.    Review of Systems   Constitutional: Positive for malaise/fatigue. Negative for chills and fever.   HENT: Positive for congestion and sinus pain. Negative for ear pain and sore throat.    Respiratory: Positive for cough and shortness of breath. Negative for wheezing.    Cardiovascular: Negative for chest pain, palpitations, orthopnea, leg swelling and PND.   Gastrointestinal: Positive for nausea. Negative for abdominal pain, blood in stool, constipation, diarrhea, heartburn, melena and vomiting.       PMH:  has a past medical history of Bowel habit changes (02/2021), Glaucoma, Heart burn, High cholesterol, Hypertension, Myocardial infarct (HCC), Snoring, and Tuberculosis (2008).  MEDS:   Current Outpatient Medications:   •  Melatonin 10 MG Tab, Take  by mouth., Disp: , Rfl:   •  metoprolol SR (TOPROL XL) 25 MG TABLET SR 24 HR, Take 0.5 Tablets by mouth every day., Disp: 45 Tablet, Rfl: 0  •  losartan (COZAAR) 50 MG Tab, Take 1 Tablet by mouth every day., Disp: 90 Tablet, Rfl: 0  •   "lansoprazole (PREVACID) 30 MG CAPSULE DELAYED RELEASE, Take 1 capsule by mouth every day., Disp: 90 capsule, Rfl: 3  •  acyclovir (ZOVIRAX) 400 MG tablet, Take 1 tablet by mouth 2 times a day., Disp: 90 tablet, Rfl: 3  •  atorvastatin (LIPITOR) 80 MG tablet, Take 1 tablet by mouth every evening., Disp: 90 tablet, Rfl: 3  •  Multiple Vitamins-Minerals (MULTIVITAMIN ADULT PO), Take  by mouth., Disp: , Rfl:   •  ADULT ASPIRIN LOW STRENGTH PO, Take 81 mg by mouth., Disp: , Rfl:   •  fexofenadine (ALLEGRA) 180 MG tablet, Take 180 mg by mouth every day., Disp: , Rfl:   ALLERGIES:   Allergies   Allergen Reactions   • Iodine Rash and Itching     Pt. States he broke out from IV contrast    • Latex Swelling     Pt. States he also gets blisters   • Cat Hair Extract Anxiety, Hives, Itching, Rash, Runny Nose, Shortness of Breath and Swelling   • Lisinopril      Cough   • Pineapple    • Shellfish Allergy      SURGHX:   Past Surgical History:   Procedure Laterality Date   • PB UPPER GI ENDOSCOPY,DIAGNOSIS  2/22/2021    Procedure: GASTROSCOPY;  Surgeon: Jcarlos Thomas M.D.;  Location: SURGERY [facility];  Service: EUS   • EGD W/ENDOSCOPIC ULTRASOUND  2/22/2021    Procedure: EGD, WITH ENDOSCOPIC US - UPPER RADIAL LINEAR;  Surgeon: Jcarlos Thomas M.D.;  Location: SURGERY [facility];  Service: EUS   • VASECTOMY  2007   • LUMBAR FUSION POSTERIOR  2004    L4-S1     SOCHX:  reports that he has never smoked. He has never used smokeless tobacco. He reports current alcohol use. He reports that he does not use drugs.  FH: Family history was reviewed, no pertinent findings to report   Objective:   /78 (BP Location: Right arm, Patient Position: Sitting, BP Cuff Size: Adult)   Pulse (!) 58   Temp 36.9 °C (98.4 °F) (Temporal)   Resp 20   Ht 1.753 m (5' 9\")   Wt 111 kg (244 lb)   SpO2 97%   BMI 36.03 kg/m²   Physical Exam  Vitals reviewed.   Constitutional:       General: He is not in acute distress.     Appearance: Normal " appearance. He is well-developed. He is not toxic-appearing.   HENT:      Head: Normocephalic and atraumatic.      Right Ear: Tympanic membrane, ear canal and external ear normal.      Left Ear: Tympanic membrane, ear canal and external ear normal.      Nose: Mucosal edema, congestion and rhinorrhea present. Rhinorrhea is clear.      Mouth/Throat:      Lips: Pink.      Mouth: Mucous membranes are moist.      Pharynx: Oropharynx is clear. Uvula midline.   Eyes:      General: Gaze aligned appropriately.   Neck:      Vascular: No JVD.   Cardiovascular:      Rate and Rhythm: Regular rhythm. Bradycardia present.      Heart sounds: Normal heart sounds, S1 normal and S2 normal.      Comments: Patient can lie supine comfortably.  Pulmonary:      Effort: Pulmonary effort is normal. No respiratory distress.      Breath sounds: Normal breath sounds. No stridor. No decreased breath sounds, wheezing, rhonchi or rales.   Musculoskeletal:      Cervical back: Neck supple.      Right lower leg: No edema.      Left lower leg: No edema.   Skin:     General: Skin is warm and dry.      Capillary Refill: Capillary refill takes less than 2 seconds.   Neurological:      Mental Status: He is alert and oriented to person, place, and time.      Comments: CN2-12 grossly intact   Psychiatric:         Speech: Speech normal.         Behavior: Behavior normal.             Imaging:  FINDINGS:     The heart is not enlarged. No focal consolidation, pleural effusion or pneumothorax is identified.  Costophrenic angles are clear. Mild degenerative changes are seen in the spine.     IMPRESSION:     No acute cardiopulmonary process is identified.      EKG:  Comparison:  2/22/21 and 12/5/20  Rhythm: Sinus rhythm  Ectopy: None  Rate: 52  QRS Axis: Normal  Conduction: Q wave in III  ST segment: No ST segment elevation or depression noted  T waves: Slightly flattened T waves in V3 and V4, inverted T wave in III and slightly flattened in aVF  Clinical  impression: Non specific T wave abnormalities- no change from previous    Assessment/Plan:   1. Nonspecific syndrome suggestive of viral illness    2. Acute rhinitis    3. Shortness of breath  - EKG - Clinic Performed  - DX-CHEST-2 VIEWS; Future  - CoV-2 and Flu A/B by PCR ([employer]); Future    No EKG changes and patient is not having any chest pain, pain with breathing, PND, orthopnea or other signs of CHF.  I do not think that shortness of breath is cardiac in origin.  No evidence of Covid pneumonia, community-acquired pneumonia, bronchitis, pulmonary edema or other acute cardiopulmonary abnormality on chest x-ray.    Physical exam today consistent with viral upper respiratory infection.  COVID-19 is a consideration.  Other viral illnesses are also considerations and given patient's work environment I do recommend screening for flu A/B as well.    Patient instructed to continue quarantine and rest.  Hydrate, brat diet.  Zofran as needed for nausea.  I also recommend nasal saline rinses and beginning Flonase.  Return and ED precautions reviewed.

## 2021-09-24 NOTE — LETTER
September 24, 2021    To Whom It May Concern:         This is confirmation that Giuseppe Swift attended his scheduled appointment with Scotty Duffy P.A.-C. on 9/24/21. He is being retested for COVID-19. He was instructed to quarantine in accordance with CDC guidelines.         If you have any questions please do not hesitate to call me at the phone number listed below.    Sincerely,          Scotty Duffy P.A.-C.  [phone]

## 2021-09-25 DIAGNOSIS — R06.02 SHORTNESS OF BREATH: ICD-10-CM

## 2021-09-27 LAB
FLUAV RNA SPEC QL NAA+PROBE: NEGATIVE
FLUBV RNA SPEC QL NAA+PROBE: NEGATIVE
SARS-COV-2 RNA RESP QL NAA+PROBE: NOTDETECTED
SPECIMEN SOURCE: NORMAL

## 2021-09-27 ASSESSMENT — ENCOUNTER SYMPTOMS
CONSTIPATION: 0
CHILLS: 0
DIARRHEA: 0
ORTHOPNEA: 0
PALPITATIONS: 0
WHEEZING: 0
SINUS PAIN: 1
BLOOD IN STOOL: 0
NAUSEA: 1
ABDOMINAL PAIN: 0
COUGH: 1
SORE THROAT: 0
PND: 0
HEARTBURN: 0
VOMITING: 0
FEVER: 0
SHORTNESS OF BREATH: 1

## 2021-09-28 ENCOUNTER — OFFICE VISIT (OUTPATIENT)
Dept: MEDICAL GROUP | Facility: PHYSICIAN GROUP | Age: 46
End: 2021-09-28
Payer: COMMERCIAL

## 2021-09-28 ENCOUNTER — HOSPITAL ENCOUNTER (OUTPATIENT)
Dept: LAB | Facility: MEDICAL CENTER | Age: 46
End: 2021-09-28
Attending: FAMILY MEDICINE
Payer: COMMERCIAL

## 2021-09-28 VITALS
SYSTOLIC BLOOD PRESSURE: 128 MMHG | BODY MASS INDEX: 36.43 KG/M2 | OXYGEN SATURATION: 98 % | HEIGHT: 69 IN | WEIGHT: 246 LBS | TEMPERATURE: 97.8 F | RESPIRATION RATE: 16 BRPM | HEART RATE: 58 BPM | DIASTOLIC BLOOD PRESSURE: 90 MMHG

## 2021-09-28 DIAGNOSIS — E03.8 SUBCLINICAL HYPOTHYROIDISM: ICD-10-CM

## 2021-09-28 DIAGNOSIS — R53.82 CHRONIC FATIGUE: ICD-10-CM

## 2021-09-28 DIAGNOSIS — R73.03 PREDIABETES: ICD-10-CM

## 2021-09-28 DIAGNOSIS — Z23 NEED FOR DTAP VACCINE: ICD-10-CM

## 2021-09-28 DIAGNOSIS — F43.9 STRESS: ICD-10-CM

## 2021-09-28 PROBLEM — R73.01 ELEVATED FASTING BLOOD SUGAR: Status: RESOLVED | Noted: 2019-08-16 | Resolved: 2021-09-28

## 2021-09-28 PROBLEM — R10.32 LLQ ABDOMINAL PAIN: Status: RESOLVED | Noted: 2020-10-01 | Resolved: 2021-09-28

## 2021-09-28 LAB
BASOPHILS # BLD AUTO: 0.6 % (ref 0–1.8)
BASOPHILS # BLD: 0.06 K/UL (ref 0–0.12)
EOSINOPHIL # BLD AUTO: 0.09 K/UL (ref 0–0.51)
EOSINOPHIL NFR BLD: 0.9 % (ref 0–6.9)
ERYTHROCYTE [DISTWIDTH] IN BLOOD BY AUTOMATED COUNT: 40.2 FL (ref 35.9–50)
EST. AVERAGE GLUCOSE BLD GHB EST-MCNC: 154 MG/DL
HBA1C MFR BLD: 7 % (ref 4–5.6)
HCT VFR BLD AUTO: 47.5 % (ref 42–52)
HGB BLD-MCNC: 15.9 G/DL (ref 14–18)
IMM GRANULOCYTES # BLD AUTO: 0.04 K/UL (ref 0–0.11)
IMM GRANULOCYTES NFR BLD AUTO: 0.4 % (ref 0–0.9)
LYMPHOCYTES # BLD AUTO: 2.9 K/UL (ref 1–4.8)
LYMPHOCYTES NFR BLD: 30.1 % (ref 22–41)
MCH RBC QN AUTO: 29.7 PG (ref 27–33)
MCHC RBC AUTO-ENTMCNC: 33.5 G/DL (ref 33.7–35.3)
MCV RBC AUTO: 88.8 FL (ref 81.4–97.8)
MONOCYTES # BLD AUTO: 0.6 K/UL (ref 0–0.85)
MONOCYTES NFR BLD AUTO: 6.2 % (ref 0–13.4)
NEUTROPHILS # BLD AUTO: 5.94 K/UL (ref 1.82–7.42)
NEUTROPHILS NFR BLD: 61.8 % (ref 44–72)
NRBC # BLD AUTO: 0 K/UL
NRBC BLD-RTO: 0 /100 WBC
PLATELET # BLD AUTO: 229 K/UL (ref 164–446)
PMV BLD AUTO: 10.7 FL (ref 9–12.9)
RBC # BLD AUTO: 5.35 M/UL (ref 4.7–6.1)
T4 FREE SERPL-MCNC: 1.22 NG/DL (ref 0.93–1.7)
TESTOST SERPL-MCNC: 374 NG/DL (ref 175–781)
TSH SERPL DL<=0.005 MIU/L-ACNC: 8.53 UIU/ML (ref 0.38–5.33)
WBC # BLD AUTO: 9.6 K/UL (ref 4.8–10.8)

## 2021-09-28 PROCEDURE — 90471 IMMUNIZATION ADMIN: CPT | Performed by: FAMILY MEDICINE

## 2021-09-28 PROCEDURE — 83036 HEMOGLOBIN GLYCOSYLATED A1C: CPT

## 2021-09-28 PROCEDURE — 36415 COLL VENOUS BLD VENIPUNCTURE: CPT

## 2021-09-28 PROCEDURE — 84443 ASSAY THYROID STIM HORMONE: CPT

## 2021-09-28 PROCEDURE — 85025 COMPLETE CBC W/AUTO DIFF WBC: CPT

## 2021-09-28 PROCEDURE — 84439 ASSAY OF FREE THYROXINE: CPT

## 2021-09-28 PROCEDURE — 84403 ASSAY OF TOTAL TESTOSTERONE: CPT

## 2021-09-28 PROCEDURE — 90715 TDAP VACCINE 7 YRS/> IM: CPT | Performed by: FAMILY MEDICINE

## 2021-09-28 PROCEDURE — 99214 OFFICE O/P EST MOD 30 MIN: CPT | Mod: 25 | Performed by: FAMILY MEDICINE

## 2021-09-28 ASSESSMENT — FIBROSIS 4 INDEX: FIB4 SCORE: 0.66

## 2021-09-28 NOTE — ASSESSMENT & PLAN NOTE
This is a chronic issue.  Patient have a lot of stress in his job and that there is shortage of personnel and they are having to work harder.  It does sometimes cause troubles with him being able to sleep.  He also has a lot of personal stress.  In the past has been tried on Effexor and Lexapro but they had adverse effects.

## 2021-09-28 NOTE — ASSESSMENT & PLAN NOTE
This is a chronic problem.  Patient had mild elevation of his TSH on his last couple of checks.  He has not done his labs from January.  Now he is experiencing some troubles with cold hands and cold feet along with fatigue.  Also had some weight gain.

## 2021-09-28 NOTE — ASSESSMENT & PLAN NOTE
This is an acute and chronic problem.  Patient been having troubles with fatigue for quite some time.  He is having a lot of job stress at the present time.  Also stressed this is over her personal issues and his health.  He recently started feeling bad and was seen in urgent care a couple times.  He tested negative for Covid twice and also negative for flu virus.  He would like to be tested for testosterone and he still has some labs ordered back in January that has not been done.

## 2021-09-28 NOTE — PROGRESS NOTES
"Subjective:     CC: Several issues and follow-up from urgent care visit.    HPI:   Giuseppe presents today with the following medical concern:    Chronic fatigue  This is an acute and chronic problem.  Patient been having troubles with fatigue for quite some time.  He is having a lot of job stress at the present time.  Also stressed this is over her personal issues and his health.  He recently started feeling bad and was seen in urgent care a couple times.  He tested negative for Covid twice and also negative for flu virus.  He would like to be tested for testosterone and he still has some labs ordered back in January that has not been done.    Stress  This is a chronic issue.  Patient have a lot of stress in his job and that there is shortage of personnel and they are having to work harder.  It does sometimes cause troubles with him being able to sleep.  He also has a lot of personal stress.  In the past has been tried on Effexor and Lexapro but they had adverse effects.    Subclinical hypothyroidism  This is a chronic problem.  Patient had mild elevation of his TSH on his last couple of checks.  He has not done his labs from January.  Now he is experiencing some troubles with cold hands and cold feet along with fatigue.  Also had some weight gain.      Past Medical History:   Diagnosis Date   • Bowel habit changes 02/2021    constipation and diarrhea   • Glaucoma     L eye   • Heart burn    • High cholesterol    • Hypertension    • Myocardial infarct (HCC)    • Snoring    • Tuberculosis 2008    no treatment, \"not active\"       Social History     Tobacco Use   • Smoking status: Never Smoker   • Smokeless tobacco: Never Used   Vaping Use   • Vaping Use: Never used   Substance Use Topics   • Alcohol use: Yes     Comment: occasional, rare   • Drug use: No       Current Outpatient Medications Ordered in Epic   Medication Sig Dispense Refill   • Melatonin 10 MG Tab Take  by mouth.     • metoprolol SR (TOPROL XL) 25 MG " "TABLET SR 24 HR Take 0.5 Tablets by mouth every day. 45 Tablet 0   • losartan (COZAAR) 50 MG Tab Take 1 Tablet by mouth every day. 90 Tablet 0   • lansoprazole (PREVACID) 30 MG CAPSULE DELAYED RELEASE Take 1 capsule by mouth every day. 90 capsule 3   • acyclovir (ZOVIRAX) 400 MG tablet Take 1 tablet by mouth 2 times a day. 90 tablet 3   • atorvastatin (LIPITOR) 80 MG tablet Take 1 tablet by mouth every evening. 90 tablet 3   • Multiple Vitamins-Minerals (MULTIVITAMIN ADULT PO) Take  by mouth.     • ADULT ASPIRIN LOW STRENGTH PO Take 81 mg by mouth.     • fexofenadine (ALLEGRA) 180 MG tablet Take 180 mg by mouth every day.       No current [facility]-ordered facility-administered medications on file.       Allergies:  Iodine, Latex, Cat hair extract, Lisinopril, Pineapple, and Shellfish allergy    Health Maintenance: Completed    ROS:  Gen: no fevers/chills, no changes in weight  Eyes: no changes in vision  ENT: no sore throat, no hearing loss, no bloody nose  Pulm: no sob, no cough  CV: no chest pain, no palpitations  GI: no nausea/vomiting, no diarrhea  : no dysuria  MSk: no myalgias  Skin: no rash  Neuro: no headaches, no numbness/tingling  Heme/Lymph: no easy bruising      Objective:       Exam:  /90 (BP Location: Right arm, Patient Position: Sitting, BP Cuff Size: Large adult)   Pulse (!) 58   Temp 36.6 °C (97.8 °F) (Temporal)   Resp 16   Ht 1.753 m (5' 9\")   Wt 112 kg (246 lb)   SpO2 98%   BMI 36.33 kg/m²  Body mass index is 36.33 kg/m².    Gen: Alert and oriented, No apparent distress.  Psych:   Patient is alert and oriented x3.  No unusual thought process expressed.  Insight and judgment appears good.  He is initially a little anxious as he talks about how he has been feeling but comes down as the visit goes on.  No evidence of depression.    Labs: Labs ordered.    Assessment & Plan:     46 y.o. male with the following -     1. Chronic fatigue  This is a chronic issue.  We discussed various causes " and we will get some lab work on him.  A lot of this could be due to either thyroid disorder or his stress.  - TESTOSTERONE SERUM; Future  - CBC WITH DIFFERENTIAL; Future    2. Need for DTaP vaccine  This is an immunization issue.  Tdap given today.  - Tdap =>8yo IM    3. Subclinical hypothyroidism  This is a chronic condition.  Patient is told to recheck his TSH today and if it still elevated will start him on low-dose medication to see if it improves his symptoms.    4. Stress  This is a chronic problem.  I told patient that we will get his labs first.  If they are not beneficial we could try him on Wellbutrin to see if he tolerates that better.      Return if symptoms worsen or fail to improve.  30 minutes spent with the patient.  Please note that this dictation was created using voice recognition software. I have made every reasonable attempt to correct obvious errors, but I expect that there are errors of grammar and possibly content that I did not discover before finalizing the note.

## 2021-09-29 DIAGNOSIS — E11.9 TYPE 2 DIABETES MELLITUS WITHOUT COMPLICATION, WITHOUT LONG-TERM CURRENT USE OF INSULIN (HCC): ICD-10-CM

## 2021-09-29 DIAGNOSIS — E03.9 ACQUIRED HYPOTHYROIDISM: ICD-10-CM

## 2021-09-29 RX ORDER — LEVOTHYROXINE SODIUM 0.07 MG/1
75 TABLET ORAL
Qty: 30 TABLET | Refills: 1 | Status: SHIPPED | OUTPATIENT
Start: 2021-09-29 | End: 2021-11-09 | Stop reason: SDUPTHER

## 2021-10-19 ENCOUNTER — TELEPHONE (OUTPATIENT)
Dept: CARDIOLOGY | Facility: MEDICAL CENTER | Age: 46
End: 2021-10-19

## 2021-10-22 ENCOUNTER — OFFICE VISIT (OUTPATIENT)
Dept: CARDIOLOGY | Facility: MEDICAL CENTER | Age: 46
End: 2021-10-22
Payer: COMMERCIAL

## 2021-10-22 VITALS
DIASTOLIC BLOOD PRESSURE: 72 MMHG | SYSTOLIC BLOOD PRESSURE: 128 MMHG | HEART RATE: 71 BPM | RESPIRATION RATE: 16 BRPM | HEIGHT: 69 IN | BODY MASS INDEX: 36.7 KG/M2 | OXYGEN SATURATION: 93 % | WEIGHT: 247.8 LBS

## 2021-10-22 DIAGNOSIS — I25.10 CORONARY ARTERY DISEASE INVOLVING NATIVE CORONARY ARTERY OF NATIVE HEART WITHOUT ANGINA PECTORIS: ICD-10-CM

## 2021-10-22 DIAGNOSIS — R07.89 OTHER CHEST PAIN: ICD-10-CM

## 2021-10-22 DIAGNOSIS — Z95.5 STENTED CORONARY ARTERY: ICD-10-CM

## 2021-10-22 DIAGNOSIS — E11.69 TYPE 2 DIABETES MELLITUS WITH HYPERCHOLESTEROLEMIA (HCC): ICD-10-CM

## 2021-10-22 DIAGNOSIS — I10 ESSENTIAL HYPERTENSION: ICD-10-CM

## 2021-10-22 DIAGNOSIS — E78.00 TYPE 2 DIABETES MELLITUS WITH HYPERCHOLESTEROLEMIA (HCC): ICD-10-CM

## 2021-10-22 DIAGNOSIS — R03.0 PRE-HYPERTENSION: ICD-10-CM

## 2021-10-22 DIAGNOSIS — K21.9 GASTROESOPHAGEAL REFLUX DISEASE WITHOUT ESOPHAGITIS: ICD-10-CM

## 2021-10-22 DIAGNOSIS — E78.00 PURE HYPERCHOLESTEROLEMIA: ICD-10-CM

## 2021-10-22 DIAGNOSIS — E03.8 OTHER SPECIFIED HYPOTHYROIDISM: ICD-10-CM

## 2021-10-22 DIAGNOSIS — E78.2 MIXED HYPERLIPIDEMIA: ICD-10-CM

## 2021-10-22 PROCEDURE — 99214 OFFICE O/P EST MOD 30 MIN: CPT | Performed by: INTERNAL MEDICINE

## 2021-10-22 RX ORDER — METOPROLOL SUCCINATE 25 MG/1
12.5 TABLET, EXTENDED RELEASE ORAL DAILY
Qty: 45 TABLET | Refills: 7 | Status: SHIPPED | OUTPATIENT
Start: 2021-10-22 | End: 2022-04-11 | Stop reason: SDUPTHER

## 2021-10-22 RX ORDER — LOSARTAN POTASSIUM 50 MG/1
50 TABLET ORAL DAILY
Qty: 90 TABLET | Refills: 7 | Status: SHIPPED | OUTPATIENT
Start: 2021-10-22 | End: 2022-04-11 | Stop reason: SDUPTHER

## 2021-10-22 RX ORDER — ATORVASTATIN CALCIUM 80 MG/1
80 TABLET, FILM COATED ORAL EVERY EVENING
Qty: 90 TABLET | Refills: 7 | Status: SHIPPED | OUTPATIENT
Start: 2021-10-22 | End: 2022-04-11 | Stop reason: SDUPTHER

## 2021-10-22 ASSESSMENT — FIBROSIS 4 INDEX: FIB4 SCORE: 0.64

## 2021-10-22 NOTE — LETTER
[facility]   [address], [address]  [place] [address]  Phone: [phone]  Fax: [phone]              Giuseppe Swift  1975    Encounter Date: 10/22/2021    Jovanna Gee M.D.          PROGRESS NOTE:  Subjective:   Chief Complaint:   Chief Complaint   Patient presents with   • Coronary Artery Disease     Dx: Coronary artery disease involving native coronary artery of native heart without angina pectoris   • Hyperlipidemia     Dx: Pure hypercholesterolemia     Giuseppe Bhandari is a 46 y.o. male who returns for CAD/MI/stent, HLP, now DM2, HTN.      He was working out in the gym, had been having chest discomfort he thought was related to lifting weights, would get tired, lightheaded, fatigued, nauseous.     The month before his MI, he thought he had the flu. Was visiting here from [place].  His cardio was being limiting by sign shortness of breath. Was at the gym, struggling, thought he was going to dry home but somehow drove to the ED, he does not really recall making the decision to go to the ED, vaguely recalls being in the ED, they reported he was sweating heavily.    Had NSTEMI October 3, 2018 in [facility] 95% prox LAD that was treated with a 4.5 x 18 mm drug-eluting stent.  There is minimal disease elsewhere.    Echocardiogram demonstrated a normal left ventricular systolic function with an LVEF of 60. Sounds like he may have had a Nuc or CT scan.  Had hives.  Completed DAPT.  On statin, BB, ASA.    Has HLP, on lipitor 80 mg, ldl 60, hdl low. LDL was initially 131.    Was IFG, now DM2.    Has HTN, controlled.   Reports cough on lisinopril.  Has an XL BP cuff now.  Does get mildly lightheaded at times upon standing, not limiting.    His O2 level has dropped but testing negative for Covid, cxray was ok.    He is not limited by chest pain, pressure or tightness with activity.   He does get some resting CP at times which is different. Does not  last long. This went away.  Pain behind left shoulder blade, had MRA to r/o TAA because of friend's spouse who  from this.    Reports mild dyspnea on exertion with cardio exercise, has not changed.  No orthopnea or lower extremity swelling.   No significant palpitations.  No  presyncope/syncope.   No symptoms of leg claudication.   No stroke/TIA like symptoms.    Fathers HX not known.  No family history of premature coronary artery disease on mother's side.  No prior smoking history.  No history of hypertension.  No history of autoimmune disease such as lupus or rheumatoid arthritis.  No chronic kidney disease.    Recently , has 3 kids with her, much stress.  Works as MA at [employer], [place]    DATA REVIEWED by me:  ECG 10-2-18 [place]  Sinus, 98     Echo 10-3-18 [place]  EF 60%, normal wall motion    LHC 10-3-18 [place]  95% prox LAD, [employer] [place] GARRET, 4.5x18 mm, LVEDP 15 mmHg    MRA chest 10-15-19  MRA of the thoracic aorta within normal limits. No aneurysmal dilatation or evidence of dissection.    Most recent labs:     Lab Results   Component Value Date/Time    HEMOGLOBIN 15.9 2021 12:35 PM    HEMATOCRIT 47.5 2021 12:35 PM    MCV 88.8 2021 12:35 PM    INR 0.95 2020 08:09 AM      Lab Results   Component Value Date/Time    SODIUM 138 2021 02:14 PM    POTASSIUM 4.0 2021 02:14 PM    CHLORIDE 102 2021 02:14 PM    CO2 24 2021 02:14 PM    GLUCOSE 88 2021 02:14 PM    BUN 15 2021 02:14 PM    CREATININE 0.85 2021 02:14 PM    CREATININE 1.0 2006 01:23 PM      Lab Results   Component Value Date/Time    ASTSGOT 21 2020 08:09 AM    ALTSGPT 44 2020 08:09 AM    ALBUMIN 4.3 2020 08:09 AM      Lab Results   Component Value Date/Time    CHOLSTRLTOT 137 2020 09:35 AM    LDL 84 2020 09:35 AM    HDL 32 (A) 2020 09:35 AM    TRIGLYCERIDE 104 2020 09:35 AM       7-19 h 15.4, p 217, ha 141, k 4.1, cr 0.98,  "lfts normal, a1c 6.1. ldl 60, hdl 39, tg 116, tc 122    Past Medical History:   Diagnosis Date   • Bowel habit changes 02/2021    constipation and diarrhea   • Glaucoma     L eye   • Heart burn    • High cholesterol    • Hypertension    • Myocardial infarct (HCC)    • Snoring    • Tuberculosis 2008    no treatment, \"not active\"     Past Surgical History:   Procedure Laterality Date   • PB UPPER GI ENDOSCOPY,DIAGNOSIS  2/22/2021    Procedure: GASTROSCOPY;  Surgeon: Jcarlos Thomas M.D.;  Location: SURGERY [facility];  Service: EUS   • EGD W/ENDOSCOPIC ULTRASOUND  2/22/2021    Procedure: EGD, WITH ENDOSCOPIC US - UPPER RADIAL LINEAR;  Surgeon: Jcarlos Thomas M.D.;  Location: SURGERY [facility];  Service: EUS   • VASECTOMY  2007   • LUMBAR FUSION POSTERIOR  2004    L4-S1     Family History   Problem Relation Age of Onset   • Diabetes Mother    • No Known Problems Father      Social History     Socioeconomic History   • Marital status: Single     Spouse name: Not on file   • Number of children: Not on file   • Years of education: Not on file   • Highest education level: Associate degree: occupational, technical, or vocational program   Occupational History   • Not on file   Tobacco Use   • Smoking status: Never Smoker   • Smokeless tobacco: Never Used   Vaping Use   • Vaping Use: Never used   Substance and Sexual Activity   • Alcohol use: Yes     Comment: occasional, rare   • Drug use: No   • Sexual activity: Yes     Partners: Female     Birth control/protection: Surgical   Other Topics Concern   • Not on file   Social History Narrative   • Not on file     Social Determinants of Health     Financial Resource Strain: Low Risk    • Difficulty of Paying Living Expenses: Not hard at all   Food Insecurity: No Food Insecurity   • Worried About Running Out of Food in the Last Year: Never true   • Ran Out of Food in the Last Year: Never true   Transportation Needs: No Transportation Needs   • Lack of Transportation (Medical): No "   • Lack of Transportation (Non-Medical): No   Physical Activity: Sufficiently Active   • Days of Exercise per Week: 4 days   • Minutes of Exercise per Session: 120 min   Stress: Stress Concern Present   • Feeling of Stress : To some extent   Social Connections: Moderately Integrated   • Frequency of Communication with Friends and Family: More than three times a week   • Frequency of Social Gatherings with Friends and Family: Once a week   • Attends [employer] Services: More than 4 times per year   • Active Member of Clubs or Organizations: Yes   • Attends Club or Organization Meetings: More than 4 times per year   • Marital Status:    Intimate Partner Violence:    • Fear of Current or Ex-Partner:    • Emotionally Abused:    • Physically Abused:    • Sexually Abused:      Allergies   Allergen Reactions   • Iodine Rash and Itching     Pt. States he broke out from IV contrast    • Latex Swelling     Pt. States he also gets blisters   • Cat Hair Extract Anxiety, Hives, Itching, Rash, Runny Nose, Shortness of Breath and Swelling   • Lisinopril      Cough   • Pineapple    • Shellfish Allergy Anaphylaxis       Current Outpatient Medications   Medication Sig Dispense Refill   • atorvastatin (LIPITOR) 80 MG tablet Take 1 Tablet by mouth every evening. 90 Tablet 7   • losartan (COZAAR) 50 MG Tab Take 1 Tablet by mouth every day. 90 Tablet 7   • metoprolol SR (TOPROL XL) 25 MG TABLET SR 24 HR Take 0.5 Tablets by mouth every day. 45 Tablet 7   • levothyroxine (SYNTHROID) 75 MCG Tab Take 1 Tablet by mouth every morning on an empty stomach. 30 Tablet 1   • Melatonin 10 MG Tab Take  by mouth.     • lansoprazole (PREVACID) 30 MG CAPSULE DELAYED RELEASE Take 1 capsule by mouth every day. 90 capsule 3   • acyclovir (ZOVIRAX) 400 MG tablet Take 1 tablet by mouth 2 times a day. 90 tablet 3   • Multiple Vitamins-Minerals (MULTIVITAMIN ADULT PO) Take  by mouth.     • ADULT ASPIRIN LOW STRENGTH PO Take 81 mg by mouth.     •  "fexofenadine (ALLEGRA) 180 MG tablet Take 180 mg by mouth every day.       No current facility-administered medications for this visit.       ROS    All others systems reviewed and negative.     Objective:     /72 (BP Location: Left arm, Patient Position: Sitting, BP Cuff Size: Adult)   Pulse 71   Resp 16   Ht 1.753 m (5' 9\")   Wt 112 kg (247 lb 12.8 oz)   SpO2 93%  Body mass index is 36.59 kg/m².    Physical Exam   General: No acute distress. Well nourished.  HEENT: EOM grossly intact, no scleral icterus, no pharyngeal erythema.   Neck:  No JVD, no bruits, trachea midline  CVS: RRR. Normal S1, S2. No M/R/G. No LE edema.  2+ radial pulses, 2+ PT pulses  Resp: CTAB. No wheezing or crackles/rhonchi. Normal respiratory effort.  Abdomen: Soft, NT, no solo hepatomegaly.  MSK/Ext: No clubbing or cyanosis.  Skin: Warm and dry, no rashes.  Neurological: CN III-XII grossly intact. No focal deficits.   Psych: A&O x 3, appropriate affect, good judgement    Physical exam performed today and unchanged, except what is noted, compared to 6-4-2020      Assessment:     1. Coronary artery disease involving native coronary artery of native heart without angina pectoris     2. Stented coronary artery     3. Gastroesophageal reflux disease without esophagitis     4. Mixed hyperlipidemia     5. Other chest pain     6. Other specified hypothyroidism     7. Type 2 diabetes mellitus with hypercholesterolemia (HCC)     8. Essential hypertension     9. Pure hypercholesterolemia  atorvastatin (LIPITOR) 80 MG tablet   10. Pre-hypertension  metoprolol SR (TOPROL XL) 25 MG TABLET SR 24 HR       Medical Decision Making:  Today's Assessment / Status / Plan:     -Cont aggressive secondary prevention, ASA, statin, BB  -BP controlled  -DM2 now, diet only  -We discussed nitro PRN to test symptoms, he is not sure, will have available at any time  -cont to exercise  -Trying to lose weight  -Working on thyroid  -He likes to lift weights, I " discussed that is not ideal, focus on cardio, lighter weights, more reps  -RTC 6 months      Return in about 6 months (around 4/22/2022).    It is my pleasure to participate in the care of Mr. Bhandari.  Please do not hesitate to contact me with questions or concerns.    Jovanna Gee MD, [facility]  Cardiologist [facility]    Please note that this dictation was created using voice recognition software. I have made every reasonable attempt to correct obvious errors, but it is possible there are errors of grammar and possibly content that I did not discover before finalizing the note.        Manjeet Moyer III, M.D.  [address]  [place] [address]  Via In Basket

## 2021-10-22 NOTE — PROGRESS NOTES
Subjective:   Chief Complaint:   Chief Complaint   Patient presents with   • Coronary Artery Disease     Dx: Coronary artery disease involving native coronary artery of native heart without angina pectoris   • Hyperlipidemia     Dx: Pure hypercholesterolemia     Giuseppe Bhandari is a 46 y.o. male who returns for CAD/MI/stent, HLP, now DM2, HTN.      He was working out in the gym, had been having chest discomfort he thought was related to lifting weights, would get tired, lightheaded, fatigued, nauseous.     The month before his MI, he thought he had the flu. Was visiting here from [place].  His cardio was being limiting by sign shortness of breath. Was at the gym, struggling, thought he was going to dry home but somehow drove to the ED, he does not really recall making the decision to go to the ED, vaguely recalls being in the ED, they reported he was sweating heavily.    Had NSTEMI October 3, 2018 in [facility] 95% prox LAD that was treated with a 4.5 x 18 mm drug-eluting stent.  There is minimal disease elsewhere.    Echocardiogram demonstrated a normal left ventricular systolic function with an LVEF of 60. Sounds like he may have had a Nuc or CT scan.  Had hives.  Completed DAPT.  On statin, BB, ASA.    Has HLP, on lipitor 80 mg, ldl 60, hdl low. LDL was initially 131.    Was IFG, now DM2.    Has HTN, controlled.   Reports cough on lisinopril.  Has an XL BP cuff now.  Does get mildly lightheaded at times upon standing, not limiting.    His O2 level has dropped but testing negative for Covid, cxray was ok.    He is not limited by chest pain, pressure or tightness with activity.   He does get some resting CP at times which is different. Does not last long. This went away.  Pain behind left shoulder blade, had MRA to r/o TAA because of friend's spouse who  from this.    Reports mild dyspnea on exertion with cardio exercise, has not changed.  No orthopnea or lower extremity swelling.   No significant  palpitations.  No  presyncope/syncope.   No symptoms of leg claudication.   No stroke/TIA like symptoms.    Fathers HX not known.  No family history of premature coronary artery disease on mother's side.  No prior smoking history.  No history of hypertension.  No history of autoimmune disease such as lupus or rheumatoid arthritis.  No chronic kidney disease.    Recently , has 3 kids with her, much stress.  Works as MA at [employer], he [place].    DATA REVIEWED by me:  ECG 10-2-18 [place]  Sinus, 98     Echo 10-3-18 [place]  EF 60%, normal wall motion    LHC 10-3-18 [place]  95% prox LAD, [employer] [place] GARRET, 4.5x18 mm, LVEDP 15 mmHg    MRA chest 10-15-19  MRA of the thoracic aorta within normal limits. No aneurysmal dilatation or evidence of dissection.    Most recent labs:     Lab Results   Component Value Date/Time    HEMOGLOBIN 15.9 09/28/2021 12:35 PM    HEMATOCRIT 47.5 09/28/2021 12:35 PM    MCV 88.8 09/28/2021 12:35 PM    INR 0.95 11/12/2020 08:09 AM      Lab Results   Component Value Date/Time    SODIUM 138 02/16/2021 02:14 PM    POTASSIUM 4.0 02/16/2021 02:14 PM    CHLORIDE 102 02/16/2021 02:14 PM    CO2 24 02/16/2021 02:14 PM    GLUCOSE 88 02/16/2021 02:14 PM    BUN 15 02/16/2021 02:14 PM    CREATININE 0.85 02/16/2021 02:14 PM    CREATININE 1.0 01/26/2006 01:23 PM      Lab Results   Component Value Date/Time    ASTSGOT 21 11/12/2020 08:09 AM    ALTSGPT 44 11/12/2020 08:09 AM    ALBUMIN 4.3 11/12/2020 08:09 AM      Lab Results   Component Value Date/Time    CHOLSTRLTOT 137 08/14/2020 09:35 AM    LDL 84 08/14/2020 09:35 AM    HDL 32 (A) 08/14/2020 09:35 AM    TRIGLYCERIDE 104 08/14/2020 09:35 AM       9-7-19 h 15.4, p 217, ha 141, k 4.1, cr 0.98, lfts normal, a1c 6.1. ldl 60, hdl 39, tg 116, tc 122    Past Medical History:   Diagnosis Date   • Bowel habit changes 02/2021    constipation and diarrhea   • Glaucoma     L eye   • Heart burn    • High cholesterol    • Hypertension    • Myocardial infarct (HCC)   "  • Snoring    • Tuberculosis 2008    no treatment, \"not active\"     Past Surgical History:   Procedure Laterality Date   • PB UPPER GI ENDOSCOPY,DIAGNOSIS  2/22/2021    Procedure: GASTROSCOPY;  Surgeon: Jcarlos Thomas M.D.;  Location: SURGERY [facility];  Service: EUS   • EGD W/ENDOSCOPIC ULTRASOUND  2/22/2021    Procedure: EGD, WITH ENDOSCOPIC US - UPPER RADIAL LINEAR;  Surgeon: Jcarlos Thomas M.D.;  Location: SURGERY [facility];  Service: EUS   • VASECTOMY  2007   • LUMBAR FUSION POSTERIOR  2004    L4-S1     Family History   Problem Relation Age of Onset   • Diabetes Mother    • No Known Problems Father      Social History     Socioeconomic History   • Marital status: Single     Spouse name: Not on file   • Number of children: Not on file   • Years of education: Not on file   • Highest education level: Associate degree: occupational, technical, or vocational program   Occupational History   • Not on file   Tobacco Use   • Smoking status: Never Smoker   • Smokeless tobacco: Never Used   Vaping Use   • Vaping Use: Never used   Substance and Sexual Activity   • Alcohol use: Yes     Comment: occasional, rare   • Drug use: No   • Sexual activity: Yes     Partners: Female     Birth control/protection: Surgical   Other Topics Concern   • Not on file   Social History Narrative   • Not on file     Social Determinants of Health     Financial Resource Strain: Low Risk    • Difficulty of Paying Living Expenses: Not hard at all   Food Insecurity: No Food Insecurity   • Worried About Running Out of Food in the Last Year: Never true   • Ran Out of Food in the Last Year: Never true   Transportation Needs: No Transportation Needs   • Lack of Transportation (Medical): No   • Lack of Transportation (Non-Medical): No   Physical Activity: Sufficiently Active   • Days of Exercise per Week: 4 days   • Minutes of Exercise per Session: 120 min   Stress: Stress Concern Present   • Feeling of Stress : To some extent   Social Connections: " Moderately Integrated   • Frequency of Communication with Friends and Family: More than three times a week   • Frequency of Social Gatherings with Friends and Family: Once a week   • Attends [employer] Services: More than 4 times per year   • Active Member of Clubs or Organizations: Yes   • Attends Club or Organization Meetings: More than 4 times per year   • Marital Status:    Intimate Partner Violence:    • Fear of Current or Ex-Partner:    • Emotionally Abused:    • Physically Abused:    • Sexually Abused:      Allergies   Allergen Reactions   • Iodine Rash and Itching     Pt. States he broke out from IV contrast    • Latex Swelling     Pt. States he also gets blisters   • Cat Hair Extract Anxiety, Hives, Itching, Rash, Runny Nose, Shortness of Breath and Swelling   • Lisinopril      Cough   • Pineapple    • Shellfish Allergy Anaphylaxis       Current Outpatient Medications   Medication Sig Dispense Refill   • atorvastatin (LIPITOR) 80 MG tablet Take 1 Tablet by mouth every evening. 90 Tablet 7   • losartan (COZAAR) 50 MG Tab Take 1 Tablet by mouth every day. 90 Tablet 7   • metoprolol SR (TOPROL XL) 25 MG TABLET SR 24 HR Take 0.5 Tablets by mouth every day. 45 Tablet 7   • levothyroxine (SYNTHROID) 75 MCG Tab Take 1 Tablet by mouth every morning on an empty stomach. 30 Tablet 1   • Melatonin 10 MG Tab Take  by mouth.     • lansoprazole (PREVACID) 30 MG CAPSULE DELAYED RELEASE Take 1 capsule by mouth every day. 90 capsule 3   • acyclovir (ZOVIRAX) 400 MG tablet Take 1 tablet by mouth 2 times a day. 90 tablet 3   • Multiple Vitamins-Minerals (MULTIVITAMIN ADULT PO) Take  by mouth.     • ADULT ASPIRIN LOW STRENGTH PO Take 81 mg by mouth.     • fexofenadine (ALLEGRA) 180 MG tablet Take 180 mg by mouth every day.       No current facility-administered medications for this visit.       ROS    All others systems reviewed and negative.     Objective:     /72 (BP Location: Left arm, Patient Position:  "Sitting, BP Cuff Size: Adult)   Pulse 71   Resp 16   Ht 1.753 m (5' 9\")   Wt 112 kg (247 lb 12.8 oz)   SpO2 93%  Body mass index is 36.59 kg/m².    Physical Exam   General: No acute distress. Well nourished.  HEENT: EOM grossly intact, no scleral icterus, no pharyngeal erythema.   Neck:  No JVD, no bruits, trachea midline  CVS: RRR. Normal S1, S2. No M/R/G. No LE edema.  2+ radial pulses, 2+ PT pulses  Resp: CTAB. No wheezing or crackles/rhonchi. Normal respiratory effort.  Abdomen: Soft, NT, no solo hepatomegaly.  MSK/Ext: No clubbing or cyanosis.  Skin: Warm and dry, no rashes.  Neurological: CN III-XII grossly intact. No focal deficits.   Psych: A&O x 3, appropriate affect, good judgement    Physical exam performed today and unchanged, except what is noted, compared to 6-4-2020      Assessment:     1. Coronary artery disease involving native coronary artery of native heart without angina pectoris     2. Stented coronary artery     3. Gastroesophageal reflux disease without esophagitis     4. Mixed hyperlipidemia     5. Other chest pain     6. Other specified hypothyroidism     7. Type 2 diabetes mellitus with hypercholesterolemia (HCC)     8. Essential hypertension     9. Pure hypercholesterolemia  atorvastatin (LIPITOR) 80 MG tablet   10. Pre-hypertension  metoprolol SR (TOPROL XL) 25 MG TABLET SR 24 HR       Medical Decision Making:  Today's Assessment / Status / Plan:     -Cont aggressive secondary prevention, ASA, statin, BB  -BP controlled  -DM2 now, diet only  -We discussed nitro PRN to test symptoms, he is not sure, will have available at any time  -cont to exercise  -Trying to lose weight  -Working on thyroid  -He likes to lift weights, I discussed that is not ideal, focus on cardio, lighter weights, more reps  -RTC 6 months      Return in about 6 months (around 4/22/2022).    It is my pleasure to participate in the care of Mr. Bhandari.  Please do not hesitate to contact me with questions or " concerns.    Jovanna Gee MD, [facility]  Cardiologist [facility]    Please note that this dictation was created using voice recognition software. I have made every reasonable attempt to correct obvious errors, but it is possible there are errors of grammar and possibly content that I did not discover before finalizing the note.

## 2021-11-01 ENCOUNTER — TELEPHONE (OUTPATIENT)
Dept: URGENT CARE | Facility: CLINIC | Age: 46
End: 2021-11-01

## 2021-11-01 ENCOUNTER — EH NON-PROVIDER (OUTPATIENT)
Dept: OCCUPATIONAL MEDICINE | Facility: CLINIC | Age: 46
End: 2021-11-01
Payer: COMMERCIAL

## 2021-11-01 DIAGNOSIS — M79.602 PAIN OF LEFT UPPER EXTREMITY: ICD-10-CM

## 2021-11-01 DIAGNOSIS — Z23 ENCOUNTER FOR IMMUNIZATION: ICD-10-CM

## 2021-11-01 PROCEDURE — 90686 IIV4 VACC NO PRSV 0.5 ML IM: CPT | Performed by: PREVENTIVE MEDICINE

## 2021-11-01 NOTE — TELEPHONE ENCOUNTER
Madhuri Moyer,      The patient is wanting to be seen at [facility] for his left elbow concern; pain, lack of strength in  and arm, burning sensation, arm is numb at times. Is there a way that you can place a referral to us and possible xrays.    If you can, please let me know if you can help us?    Apple Segura  [facility]  Thank you.

## 2021-11-04 NOTE — TELEPHONE ENCOUNTER
Dr. Moyer,    Thank you for the referral. I did get the patient scheduled for 11/15 @ 1300 with Dr. Mckenna. We will discuss what he may need then.    Have a great day and thank you,    Apple

## 2021-11-08 ENCOUNTER — HOSPITAL ENCOUNTER (OUTPATIENT)
Dept: LAB | Facility: MEDICAL CENTER | Age: 46
End: 2021-11-08
Attending: FAMILY MEDICINE
Payer: COMMERCIAL

## 2021-11-08 DIAGNOSIS — E03.9 ACQUIRED HYPOTHYROIDISM: ICD-10-CM

## 2021-11-08 LAB — TSH SERPL DL<=0.005 MIU/L-ACNC: 2.63 UIU/ML (ref 0.38–5.33)

## 2021-11-08 PROCEDURE — 84443 ASSAY THYROID STIM HORMONE: CPT

## 2021-11-08 PROCEDURE — 36415 COLL VENOUS BLD VENIPUNCTURE: CPT

## 2021-11-09 DIAGNOSIS — E03.9 ACQUIRED HYPOTHYROIDISM: ICD-10-CM

## 2021-11-09 RX ORDER — LEVOTHYROXINE SODIUM 0.07 MG/1
75 TABLET ORAL
Qty: 90 TABLET | Refills: 3 | Status: SHIPPED | OUTPATIENT
Start: 2021-11-09 | End: 2022-11-29

## 2021-11-15 ENCOUNTER — OFFICE VISIT (OUTPATIENT)
Dept: SPORTS MEDICINE | Facility: CLINIC | Age: 46
End: 2021-11-15
Payer: COMMERCIAL

## 2021-11-15 ENCOUNTER — APPOINTMENT (OUTPATIENT)
Dept: RADIOLOGY | Facility: IMAGING CENTER | Age: 46
End: 2021-11-15
Attending: FAMILY MEDICINE
Payer: COMMERCIAL

## 2021-11-15 VITALS
RESPIRATION RATE: 18 BRPM | DIASTOLIC BLOOD PRESSURE: 88 MMHG | TEMPERATURE: 98.6 F | WEIGHT: 247.8 LBS | SYSTOLIC BLOOD PRESSURE: 128 MMHG | HEART RATE: 88 BPM | OXYGEN SATURATION: 96 % | BODY MASS INDEX: 36.7 KG/M2 | HEIGHT: 69 IN

## 2021-11-15 DIAGNOSIS — M77.12 LATERAL EPICONDYLITIS OF LEFT ELBOW: ICD-10-CM

## 2021-11-15 DIAGNOSIS — R29.898 WEAKNESS OF LEFT ARM: ICD-10-CM

## 2021-11-15 DIAGNOSIS — M54.12 LEFT CERVICAL RADICULOPATHY: ICD-10-CM

## 2021-11-15 DIAGNOSIS — R29.898 LEFT HAND WEAKNESS: ICD-10-CM

## 2021-11-15 PROCEDURE — 99214 OFFICE O/P EST MOD 30 MIN: CPT | Performed by: FAMILY MEDICINE

## 2021-11-15 PROCEDURE — 72040 X-RAY EXAM NECK SPINE 2-3 VW: CPT | Mod: TC | Performed by: FAMILY MEDICINE

## 2021-11-15 RX ORDER — CYCLOBENZAPRINE HCL 10 MG
10 TABLET ORAL 2 TIMES DAILY PRN
Qty: 14 TABLET | Refills: 0 | Status: SHIPPED | OUTPATIENT
Start: 2021-11-15 | End: 2022-04-11

## 2021-11-15 ASSESSMENT — FIBROSIS 4 INDEX: FIB4 SCORE: 0.64

## 2021-11-15 NOTE — PROGRESS NOTES
"CHIEF COMPLAINT:  Giuseppe Swift male presenting at the request of Manjeet Moyer III, M.D. for evaluation of Shoulder pain.     Giuseppe Swift is complaining of left shoulder pain  About 4 years ago  tricep extensions and curling, no specific inujry but had pain after a set  Took 6 months off and had recurrence 1 year ago and again 6 months ago  Pain is at the LEFT elbow, LEFT shoulder an periscapular  Quality is aching, burning, sharp, pressure  Pain is Radiating down the LEFT arm in an ulnar nerve distribution  Aggravated by movement and olding light weight, coffee cup, opening a bag of chips  Improved with  rest   previous shoulder injury \"RTC\" back in high school during football  Prior Treatments: CBD, topical gels, heat, icing  Prior studies: NO Prior imaging has been done   Medications tried for pain include: acetaminophen, ibuprofen, helps minimally  Mechanical Symptom history: No Locking and Popping in the elbow on occasion which can be mildly painful  Denies any significant cervical spine issues, but he does have a history of lumbar spine pain/chronic with history of L5-S1 fusion (with Gianna)    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 No pertinent past medical history.    PMH:  has a past medical history of Bowel habit changes (02/2021), Glaucoma, Heart burn, High cholesterol, Hypertension, Myocardial infarct (HCC), Snoring, and Tuberculosis (2008).  MEDS:   Current Outpatient Medications:   •  levothyroxine (SYNTHROID) 75 MCG Tab, Take 1 Tablet by mouth every morning on an empty stomach., Disp: 90 Tablet, Rfl: 3  •  atorvastatin (LIPITOR) 80 MG tablet, Take 1 Tablet by mouth every evening., Disp: 90 Tablet, Rfl: 7  •  losartan (COZAAR) 50 MG Tab, Take 1 Tablet by mouth every day., Disp: 90 Tablet, Rfl: 7  •  metoprolol " "SR (TOPROL XL) 25 MG TABLET SR 24 HR, Take 0.5 Tablets by mouth every day., Disp: 45 Tablet, Rfl: 7  •  Melatonin 10 MG Tab, Take  by mouth., Disp: , Rfl:   •  lansoprazole (PREVACID) 30 MG CAPSULE DELAYED RELEASE, Take 1 capsule by mouth every day., Disp: 90 capsule, Rfl: 3  •  acyclovir (ZOVIRAX) 400 MG tablet, Take 1 tablet by mouth 2 times a day., Disp: 90 tablet, Rfl: 3  •  Multiple Vitamins-Minerals (MULTIVITAMIN ADULT PO), Take  by mouth., Disp: , Rfl:   •  ADULT ASPIRIN LOW STRENGTH PO, Take 81 mg by mouth., Disp: , Rfl:   •  fexofenadine (ALLEGRA) 180 MG tablet, Take 180 mg by mouth every day., Disp: , Rfl:   ALLERGIES:   Allergies   Allergen Reactions   • Iodine Rash and Itching     Pt. States he broke out from IV contrast    • Latex Swelling     Pt. States he also gets blisters   • Cat Hair Extract Anxiety, Hives, Itching, Rash, Runny Nose, Shortness of Breath and Swelling   • Lisinopril      Cough   • Pineapple    • Shellfish Allergy Anaphylaxis     SURGHX:   Past Surgical History:   Procedure Laterality Date   • PB UPPER GI ENDOSCOPY,DIAGNOSIS  2/22/2021    Procedure: GASTROSCOPY;  Surgeon: Jcarlos Thomas M.D.;  Location: SURGERY [facility];  Service: EUS   • EGD W/ENDOSCOPIC ULTRASOUND  2/22/2021    Procedure: EGD, WITH ENDOSCOPIC US - UPPER RADIAL LINEAR;  Surgeon: Jcarlos Thomas M.D.;  Location: SURGERY [facility];  Service: EUS   • VASECTOMY  2007   • LUMBAR FUSION POSTERIOR  2004    L4-S1     SOCHX:  reports that he has never smoked. He has never used smokeless tobacco. He reports current alcohol use. He reports that he does not use drugs.  FH: Family history was reviewed, no pertinent findings to report     PHYSICAL EXAM:  /88 (BP Location: Left arm, Patient Position: Sitting, BP Cuff Size: Large adult)   Pulse 88   Temp 37 °C (98.6 °F) (Temporal)   Resp 18   Ht 1.753 m (5' 9\")   Wt 112 kg (247 lb 12.8 oz)   SpO2 96%   BMI 36.59 kg/m²      well-developed, well-nourished in no apparent " distress, alert and oriented x 3.  Gait: normal    Cervical spine:  Range of motion Slightly limited with Extension and Slightly limited with Lateral rotation  Spurling's testing is NEGATIVE but there is some local cervical spine discomfort  Cervical spine tenderness NEGATIVE    Strength testing:     Deltoid, bilateral 5/5  Bicep, LEFT 4/5 compared to 5/5 on the right   Tricep, bilateral 5/5  Wrist Extension, on the LEFT 4/5 compared to 5/5 on the right   Wrist Flexion, on the LEFT 4/5 compared to 5/5 on the right   Finger Abduction, on the LEFT 4/5 compared to 5/5 on the right      Sensation:  DECREASED on the left at the level of C6 and DECREASED on the left at the level of C8        Reflexes:   Biceps: R 1+/L 1+  Triceps: R 1+/L  1+  Brachial radialis R 1+/L  1+  Butts's testing is POSITIVE on the LEFT and negative on the right  The arms are otherwise neurovascularly intact     Shoulder Exam:    RIGHT Shoulder:  No visible swelling   Range of motion INTACT  Tenderness: Non-tender  Empty Can Testing 5/5  Internal Rotation 5/5  External Rotation 5/5  Lift Off Testing 5/5  Impingement testing An  NEGATIVE  Neer's testing NEGATIVE  Apprehension testing NEGATIVE  Relocation testing NEGATIVE  Pruett's Testing NEGATIVE  Grind Testing NEGATIVE      LEFT Shoulder:  No visible swelling   Range of motion slightly decreased with abduction  Tenderness: Non-tender  Empty Can Testing 4/5  Internal Rotation 4/5  External Rotation 4/5  Lift Off Testing 4/5  Impingement testing An  NEGATIVE  Neer's testing NEGATIVE  Apprehension testing NEGATIVE  Relocation testing NEGATIVE  Pruett's Testing NEGATIVE  Grind Testing NEGATIVE    Additional Findings: None      1. Left cervical radiculopathy  DX-CERVICAL SPINE-2 OR 3 VIEWS    MR-CERVICAL SPINE-W/O    cyclobenzaprine (FLEXERIL) 10 mg Tab    Referral to Pain Clinic    CANCELED: MR-CERVICAL SPINE-W/O   2. Weakness of left arm  MR-CERVICAL SPINE-W/O    Referral to Pain Clinic     CANCELED: MR-CERVICAL SPINE-W/O   3. Lateral epicondylitis of left elbow  Referral to Pain Clinic   4. Left hand weakness  MR-CERVICAL SPINE-W/O    Referral to Pain Clinic    CANCELED: MR-CERVICAL SPINE-W/O     About 4 years ago  tricep extensions and curling, no specific inujry but had pain after a set  Took 6 months off and had recurrence 1 year ago and again 6 months ago    His issues been going on for YEARS  Cervical spine straightening not mentioned on the official report  Symptoms are WORSENING  Affecting his entire LEFT arm all the way down to the hand  No associated with weakness  Check MR of the cervical spine  Referral for physiatry evaluation for consideration of PARESH    No follow-ups on file.   He can follow-up with me after MRI to discuss results or he can discuss with physiatry if he gets in in timely fashion        11/15/2021 1:08 PM     HISTORY/REASON FOR EXAM:  Atraumatic Pain  Cervical radiculopathy.     TECHNIQUE/EXAM DESCRIPTION AND NUMBER OF VIEWS:  Cervical spine series, 3 views.     COMPARISON:  None.        FINDINGS:  The alignment of the cervical spine is normal through C7 on T1.     No displaced fracture is seen.     The intervertebral disk spaces and vertebral body heights are maintained.     There is no prevertebral soft tissue swelling.     IMPRESSION:     Negative cervical spine series.             Exam Ended: 11/15/21  1:08 PM Last Resulted: 11/15/21  1:27 PM              taken here and reviewed by me    Thank you Manjeet Moyer III, M.D. for allowing me to participate in caring for your patient.    Greater than 45 minutes was spent reviewing patient history, discussing current issue, physical examination, reviewing results and documenting the visit.

## 2021-11-16 ENCOUNTER — HOSPITAL ENCOUNTER (OUTPATIENT)
Dept: RADIOLOGY | Facility: MEDICAL CENTER | Age: 46
End: 2021-11-16
Attending: FAMILY MEDICINE
Payer: COMMERCIAL

## 2021-11-16 DIAGNOSIS — R29.898 WEAKNESS OF LEFT ARM: ICD-10-CM

## 2021-11-16 DIAGNOSIS — R29.898 LEFT HAND WEAKNESS: ICD-10-CM

## 2021-11-16 DIAGNOSIS — M54.12 LEFT CERVICAL RADICULOPATHY: ICD-10-CM

## 2021-11-16 PROCEDURE — 72141 MRI NECK SPINE W/O DYE: CPT

## 2021-12-13 ENCOUNTER — OFFICE VISIT (OUTPATIENT)
Dept: PHYSICAL MEDICINE AND REHAB | Facility: MEDICAL CENTER | Age: 46
End: 2021-12-13
Payer: COMMERCIAL

## 2021-12-13 VITALS
BODY MASS INDEX: 37 KG/M2 | WEIGHT: 249.78 LBS | OXYGEN SATURATION: 94 % | HEART RATE: 66 BPM | RESPIRATION RATE: 16 BRPM | HEIGHT: 69 IN | TEMPERATURE: 98.7 F | SYSTOLIC BLOOD PRESSURE: 138 MMHG | DIASTOLIC BLOOD PRESSURE: 88 MMHG

## 2021-12-13 DIAGNOSIS — M79.10 MYALGIA: Primary | ICD-10-CM

## 2021-12-13 DIAGNOSIS — M54.2 CERVICALGIA: ICD-10-CM

## 2021-12-13 DIAGNOSIS — M77.12 LATERAL EPICONDYLITIS OF LEFT ELBOW: ICD-10-CM

## 2021-12-13 PROCEDURE — 99204 OFFICE O/P NEW MOD 45 MIN: CPT | Performed by: STUDENT IN AN ORGANIZED HEALTH CARE EDUCATION/TRAINING PROGRAM

## 2021-12-13 ASSESSMENT — PAIN SCALES - GENERAL: PAINLEVEL: 8=MODERATE-SEVERE PAIN

## 2021-12-13 ASSESSMENT — FIBROSIS 4 INDEX: FIB4 SCORE: 0.64

## 2021-12-13 ASSESSMENT — PATIENT HEALTH QUESTIONNAIRE - PHQ9: CLINICAL INTERPRETATION OF PHQ2 SCORE: 0

## 2021-12-13 NOTE — PATIENT INSTRUCTIONS
Take up to 4g of tylenol per day. Take no more than 1g at a time. Separate your doses by at least 6 hours

## 2021-12-13 NOTE — PROGRESS NOTES
New Patient Note    Interventional Pain and Spine  Physiatry (Physical Medicine and Rehabilitation)     Patient Name: Giuseppe Swift  : 1975  Date of Service: 2021  PCP: Manjeet Moyer III, M.D.  Referring Provider: Richard Mckenna M.D.    Chief Complaint:   Chief Complaint   Patient presents with   • New Patient     neck pain       HISTORY    HPI:  Giuseppe Swift is a 46 y.o. RHD male who presents today with pain at his left neck, left upper arm, and left forearm. He was originally diagnosed with lateral epicondylitis by Dr. Mckenna on 11/15/21.  Over time his pain worsened worsened to also encompass left upper arm and left posterior shoulder. He has dealt with this left elbow pain for multiple years and notes that usually it occurs after weightlifting. His current left elbow pain started after doing skullcrushers approximately 6 months ago. He typically takes a break for many months and then gets back to weightlifting.     He states his pain at his left neck, left upper arm, and left forearm feels like tightness, throbbing, and burning. He also reports numbness, tingling, and pins and needles in his left arm and hand, all fingers. This numbness and tingling can be provoked by palpating different parts of his left upper trapezius. During the course of the day, his pain at its best-worse level is 9-10/10, respectively. Pain right now is 9/10 on the numeric pain scale. Pain worsens with sitting, standing, walking, bending forward, bending backwards, side bending or twisting, walking upstairs and walking downstairs and improves with nothing. His pain interferes somewhat with ADLs. The patient endorses pain limited diffuse weakness of his left upper extremity and has had to stop lifting weights. He otherwise denies new weakness, numbness, or bladder/bowel incontinence     Of note he reports history of bilateral upper extremity nerve injury that was seen on EMG back performed when  [facility] was known as [facility].  He states he sustained this nerve injury from incorrect positioning during L5-S1 fusion surgery.  I am unable to find this EMG in chart review today.     MA at [facility]    The patient has not done physical therapy for this problem    Patient has tried the following medications with varied success (current meds in bold):   ibu 600-800mg with 500mg tylenol   Topical voltaren  CBD/THC 50:50 mix - relief  General reluctant to take medications    Therapeutic modalities and interventional therapies to date include:  -Has not had injections    Medical history includes CAD, prehypertension, obesity, GERD, diabetes, hypothyroidism.    Psychological testing for pain as depression and pain commonly coexist and need to both be treated.     Opioid Risk Score: 0     Interpretation of Opioid Risk Score   Score 0-3 = Low risk of abuse. Do UDS at least once per year.  Score 4-7 = Moderate risk of abuse. Do UDS 1-4 times per year.  Score 8+ = High risk of abuse. Refer to specialist.    PHQ  Depression Screen (PHQ-2/PHQ-9) 8/21/2020 1/25/2021 12/13/2021   PHQ-2 Total Score 0 0 0       Interpretation of PHQ-9 Total Score   Score Severity   1-4 No Depression   5-9 Mild Depression   10-14 Moderate Depression   15-19 Moderately Severe Depression   20-27 Severe Depression      Medical records review:  I reviewed the note from the referring provider Richard Mckenna M.D. including the note dated 11/15/2021 and 11/29/2021.    ROS:   Red Flags ROS:   Fever, Chills, Sweats: Denies  Involuntary Weight Loss: Denies  Bladder Incontinence: Denies  Bowel Incontinence: denies  Saddle Anesthesia: Denies    All other systems reviewed and negative.     PMHx:   Past Medical History:   Diagnosis Date   • Bowel habit changes 02/2021    constipation and diarrhea   • Glaucoma     L eye   • Heart burn    • High cholesterol    • Hypertension    • Myocardial infarct (HCC)    • Snoring    • Tuberculosis 2008    no  "treatment, \"not active\"       PSHx:   Past Surgical History:   Procedure Laterality Date   • PB UPPER GI ENDOSCOPY,DIAGNOSIS  2/22/2021    Procedure: GASTROSCOPY;  Surgeon: Jcarlos Thomas M.D.;  Location: SURGERY [facility];  Service: EUS   • EGD W/ENDOSCOPIC ULTRASOUND  2/22/2021    Procedure: EGD, WITH ENDOSCOPIC US - UPPER RADIAL LINEAR;  Surgeon: cJarlos Thomas M.D.;  Location: SURGERY [facility];  Service: EUS   • VASECTOMY  2007   • LUMBAR FUSION POSTERIOR  2004    L4-S1       Family Hx:   Family History   Problem Relation Age of Onset   • Diabetes Mother    • No Known Problems Father        Social Hx:  Social History     Socioeconomic History   • Marital status: Single     Spouse name: Not on file   • Number of children: Not on file   • Years of education: Not on file   • Highest education level: Associate degree: occupational, technical, or vocational program   Occupational History   • Not on file   Tobacco Use   • Smoking status: Never Smoker   • Smokeless tobacco: Never Used   Vaping Use   • Vaping Use: Never used   Substance and Sexual Activity   • Alcohol use: Not Currently     Comment: occasional, rare   • Drug use: No   • Sexual activity: Yes     Partners: Female     Birth control/protection: Surgical   Other Topics Concern   • Not on file   Social History Narrative   • Not on file     Social Determinants of Health     Financial Resource Strain: Low Risk    • Difficulty of Paying Living Expenses: Not hard at all   Food Insecurity: No Food Insecurity   • Worried About Running Out of Food in the Last Year: Never true   • Ran Out of Food in the Last Year: Never true   Transportation Needs: No Transportation Needs   • Lack of Transportation (Medical): No   • Lack of Transportation (Non-Medical): No   Physical Activity: Sufficiently Active   • Days of Exercise per Week: 4 days   • Minutes of Exercise per Session: 120 min   Stress: Stress Concern Present   • Feeling of Stress : To some extent   Social " Connections: Moderately Integrated   • Frequency of Communication with Friends and Family: More than three times a week   • Frequency of Social Gatherings with Friends and Family: Once a week   • Attends Denominational Services: More than 4 times per year   • Active Member of Clubs or Organizations: Yes   • Attends Club or Organization Meetings: More than 4 times per year   • Marital Status:    Intimate Partner Violence:    • Fear of Current or Ex-Partner: Not on file   • Emotionally Abused: Not on file   • Physically Abused: Not on file   • Sexually Abused: Not on file   Housing Stability: Unknown   • Unable to Pay for Housing in the Last Year: No   • Number of Places Lived in the Last Year: Not on file   • Unstable Housing in the Last Year: No       Allergies:  Allergies   Allergen Reactions   • Iodine Rash and Itching     Pt. States he broke out from IV contrast    • Latex Swelling     Pt. States he also gets blisters   • Cat Hair Extract Anxiety, Hives, Itching, Rash, Runny Nose, Shortness of Breath and Swelling   • Lisinopril      Cough   • Pineapple    • Shellfish Allergy Anaphylaxis       Medications: reviewed on epic.   Outpatient Medications Marked as Taking for the 12/13/21 encounter (Office Visit) with Neela Dillard M.D.   Medication Sig Dispense Refill   • cyclobenzaprine (FLEXERIL) 10 mg Tab Take 1 Tablet by mouth 2 times a day as needed. May cause drowsiness (do not operate heavy machinery). 14 Tablet 0   • levothyroxine (SYNTHROID) 75 MCG Tab Take 1 Tablet by mouth every morning on an empty stomach. 90 Tablet 3   • atorvastatin (LIPITOR) 80 MG tablet Take 1 Tablet by mouth every evening. 90 Tablet 7   • losartan (COZAAR) 50 MG Tab Take 1 Tablet by mouth every day. 90 Tablet 7   • metoprolol SR (TOPROL XL) 25 MG TABLET SR 24 HR Take 0.5 Tablets by mouth every day. 45 Tablet 7   • Melatonin 10 MG Tab Take  by mouth.     • lansoprazole (PREVACID) 30 MG CAPSULE DELAYED RELEASE Take 1 capsule by mouth  "every day. 90 capsule 3   • acyclovir (ZOVIRAX) 400 MG tablet Take 1 tablet by mouth 2 times a day. 90 tablet 3   • Multiple Vitamins-Minerals (MULTIVITAMIN ADULT PO) Take  by mouth.     • ADULT ASPIRIN LOW STRENGTH PO Take 81 mg by mouth.     • fexofenadine (ALLEGRA) 180 MG tablet Take 180 mg by mouth every day.          Current Outpatient Medications on File Prior to Visit   Medication Sig Dispense Refill   • cyclobenzaprine (FLEXERIL) 10 mg Tab Take 1 Tablet by mouth 2 times a day as needed. May cause drowsiness (do not operate heavy machinery). 14 Tablet 0   • levothyroxine (SYNTHROID) 75 MCG Tab Take 1 Tablet by mouth every morning on an empty stomach. 90 Tablet 3   • atorvastatin (LIPITOR) 80 MG tablet Take 1 Tablet by mouth every evening. 90 Tablet 7   • losartan (COZAAR) 50 MG Tab Take 1 Tablet by mouth every day. 90 Tablet 7   • metoprolol SR (TOPROL XL) 25 MG TABLET SR 24 HR Take 0.5 Tablets by mouth every day. 45 Tablet 7   • Melatonin 10 MG Tab Take  by mouth.     • lansoprazole (PREVACID) 30 MG CAPSULE DELAYED RELEASE Take 1 capsule by mouth every day. 90 capsule 3   • acyclovir (ZOVIRAX) 400 MG tablet Take 1 tablet by mouth 2 times a day. 90 tablet 3   • Multiple Vitamins-Minerals (MULTIVITAMIN ADULT PO) Take  by mouth.     • ADULT ASPIRIN LOW STRENGTH PO Take 81 mg by mouth.     • fexofenadine (ALLEGRA) 180 MG tablet Take 180 mg by mouth every day.       No current facility-administered medications on file prior to visit.         EXAMINATION     Physical Exam:   /88 (BP Location: Right arm, Patient Position: Sitting, BP Cuff Size: Adult)   Pulse 66   Temp 37.1 °C (98.7 °F) (Temporal)   Resp 16   Ht 1.753 m (5' 9\")   Wt 113 kg (249 lb 12.5 oz)   SpO2 94%     Constitutional:   Body Habitus: Body mass index is 36.89 kg/m².  Cooperation: Fully cooperates with exam  Appearance: Well-groomed, well-nourished.    Eyes: No scleral icterus to suggest severe liver disease, no proptosis to suggest " severe hyperthyroidism    ENT -no obvious auditory deficits, no noticeable facial droop     Skin -no rashes or lesions noted     Respiratory-  breathing comfortably on room air, no audible wheezing    Cardiovascular-distal extremities warm and well perfused.  No lower extremity edema is noted.     Gastrointestinal - no obvious abdominal masses, non-distended    Psychiatric- alert and oriented ×3. Normal affect.     Gait - normal gait, no use of ambulatory device, nonantalgic.     Musculoskeletal and Neuro -   Positive left Cozens    Cervical spine   Inspection: No deformities of the skin over the cervical spine. No rashes or lesions.     full active range of motion in all directions    Spurling's sign  negative bilaterally  No signs of muscular atrophy in bilateral upper extremities     Palpation of left biceps, left triceps, left upper trapezius reproduces radiating pain down left arm with associated numbness and tingling. Tenderness to palpation at midline of cervical spine. No tenderness to palpation elsewhere including paracervical muscles bilaterally, cervical facets bilaterally and upper trapezius on right.    Key points for the international standards for neurological classification of spinal cord injury (ISNCSCI) to light touch.     Dermatome R L   C4 2 2   C5 2 2   C6 2 2   C7 2 2   C8 2 2   T1 2 2   T2 2 2       Motor Exam Upper Extremities   ? Myotome R L   Shoulder abduction C5 5 4*   Elbow flexion C5 5 4*   Wrist extension C6 5 4*   Elbow extension C7 5 4-*   Finger flexion C8 5 4*   Finger abduction T1 5 5   *pain limited    Reflexes  ?  R L   Biceps  2+ 2+   Brachioradialis  2+ 2+     Butts's sign negative bilaterally        MEDICAL DECISION MAKING    Medical records review: see under HPI section.     DATA  Labs: Personally reviewed at today's visit    Lab Results   Component Value Date/Time    SODIUM 138 02/16/2021 02:14 PM    POTASSIUM 4.0 02/16/2021 02:14 PM    CHLORIDE 102 02/16/2021 02:14 PM     CO2 24 02/16/2021 02:14 PM    ANION 12.0 02/16/2021 02:14 PM    GLUCOSE 88 02/16/2021 02:14 PM    BUN 15 02/16/2021 02:14 PM    CREATININE 0.85 02/16/2021 02:14 PM    CREATININE 1.0 01/26/2006 01:23 PM    CALCIUM 9.3 02/16/2021 02:14 PM    ASTSGOT 21 11/12/2020 08:09 AM    ALTSGPT 44 11/12/2020 08:09 AM    TBILIRUBIN 0.4 11/12/2020 08:09 AM    ALBUMIN 4.3 11/12/2020 08:09 AM    TOTPROTEIN 6.9 11/12/2020 08:09 AM    GLOBULIN 2.6 11/12/2020 08:09 AM    AGRATIO 1.7 11/12/2020 08:09 AM       Lab Results   Component Value Date/Time    PROTHROMBTM 13.0 11/12/2020 08:09 AM    INR 0.95 11/12/2020 08:09 AM        Lab Results   Component Value Date/Time    WBC 9.6 09/28/2021 12:35 PM    RBC 5.35 09/28/2021 12:35 PM    HEMOGLOBIN 15.9 09/28/2021 12:35 PM    HEMATOCRIT 47.5 09/28/2021 12:35 PM    MCV 88.8 09/28/2021 12:35 PM    MCH 29.7 09/28/2021 12:35 PM    MCHC 33.5 (L) 09/28/2021 12:35 PM    MPV 10.7 09/28/2021 12:35 PM    NEUTSPOLYS 61.80 09/28/2021 12:35 PM    LYMPHOCYTES 30.10 09/28/2021 12:35 PM    MONOCYTES 6.20 09/28/2021 12:35 PM    EOSINOPHILS 0.90 09/28/2021 12:35 PM    BASOPHILS 0.60 09/28/2021 12:35 PM        Lab Results   Component Value Date/Time    HBA1C 7.0 (H) 09/28/2021 12:35 PM        Imaging:   I personally reviewed following images, these are my reads  MRI cervical spine 11/16/21  No significant central canal stenosis or neuroforaminal stenosis at any level      IMAGING radiology reads. I reviewed the following radiology reads   Results for orders placed during the hospital encounter of 11/16/21    MR-CERVICAL SPINE-W/O    Impression  Mild endplate degenerative changes as described above. There is no significant spinal canal stenosis or foraminal narrowing.                             Results for orders placed in visit on 11/15/21    DX-CERVICAL SPINE-2 OR 3 VIEWS    Impression  Negative cervical spine series.                           Diagnosis  Visit Diagnoses     ICD-10-CM   1. Myalgia  M79.10   2.  Cervicalgia  M54.2   3. Lateral epicondylitis of left elbow  M77.12         ASSESSMENT AND PLAN:  Giuseppe Swift is a 46 y.o. male with history of CAD, prehypertension, obesity, GERD, diabetes, hypothyroidism who presents with many months of pain in his left upper shoulder and left upper arm. He also has numbness and tingling from his left upper trapezius down to his fingers which appears to be in a nondermatomal distribution and not in the distribution of a peripheral nerve, elicited with generalized palpation of various muscles including his left upper trapezius, left biceps, left triceps, suggestive of myofascial pain with a component of referred pain.    MRI reviewed and discussed with patient, negative for signs of neuroforaminal narrowing or stenosis, making diagnosis of cervical radiculopathy less likely.    He also reports recurrent left elbow pain with forearm tenderness with positive Cozen's test suggestive of left lateral epicondylitis.     Giuseppe was seen today for new patient.    Diagnoses and all orders for this visit:    Myalgia  -     Referral to Pain Clinic  -     Referral to Physical Therapy    Cervicalgia  -     Referral to Pain Clinic  -     Referral to Physical Therapy    Lateral epicondylitis of left elbow  -     Referral to Physical Therapy          The above note documents my personal evaluation of this patient. In addition, I have reviewed and confirmed with the patient and MA the supportive information documented in today's Patient Health Questionnaire and Office Note.       PLAN  Physical Therapy: I ordered physical therapy to focus on strengthening and stretching as well as a home exercise program.     Medications:  -Okay to continue current medications.  Patient would like to avoid adding muscle relaxants at this time.    Interventions: Trigger point injections with ultrasound guidance. The risks, benefits, and alternatives to this procedure were discussed and the patient wishes  to proceed with the procedure. Risks include but are not limited to damage to surrounding structures, infection, bleeding, worsening of pain which can be permanent, and weakness which can be permanent. Benefits include pain relief and improved function. Alternatives include not doing the procedure.      Of note he reports history of bilateral upper extremity nerve injury that was seen on EMG back performed when [facility] was known as [facility].  He states he sustained this nerve injury from incorrect positioning during L5-S1 fusion surgery.  I am unable to find this EMG in chart review today.    Follow-up: For trigger point injections noted above     Orders Placed This Encounter   • Referral to Pain Clinic   • Referral to Physical Therapy     Total time: 46 minutes. I spent greater than 50% of the time for patient care and coordination on this date, including face-to-face time with the patient as per assessment and plan above.     Neela Dillard MD  Interventional Pain Management  Physical Medicine and Rehabilitation  [facility]    Richard Sommer M.D.     Please note that this dictation was created using voice recognition software. I have made every reasonable attempt to correct obvious errors, but I expect that there are errors of grammar and possibly content that I did not discover before finalizing the note.

## 2021-12-20 ENCOUNTER — OFFICE VISIT (OUTPATIENT)
Dept: PHYSICAL MEDICINE AND REHAB | Facility: MEDICAL CENTER | Age: 46
End: 2021-12-20
Payer: COMMERCIAL

## 2021-12-20 VITALS
TEMPERATURE: 97.6 F | SYSTOLIC BLOOD PRESSURE: 138 MMHG | HEART RATE: 64 BPM | BODY MASS INDEX: 37.62 KG/M2 | RESPIRATION RATE: 18 BRPM | HEIGHT: 69 IN | OXYGEN SATURATION: 95 % | DIASTOLIC BLOOD PRESSURE: 88 MMHG | WEIGHT: 253.97 LBS

## 2021-12-20 DIAGNOSIS — M79.10 MYALGIA: ICD-10-CM

## 2021-12-20 DIAGNOSIS — M54.2 CERVICALGIA: ICD-10-CM

## 2021-12-20 PROCEDURE — 20553 NJX 1/MLT TRIGGER POINTS 3/>: CPT | Performed by: STUDENT IN AN ORGANIZED HEALTH CARE EDUCATION/TRAINING PROGRAM

## 2021-12-20 PROCEDURE — 76942 ECHO GUIDE FOR BIOPSY: CPT | Performed by: STUDENT IN AN ORGANIZED HEALTH CARE EDUCATION/TRAINING PROGRAM

## 2021-12-20 ASSESSMENT — PAIN SCALES - GENERAL: PAINLEVEL: 9=SEVERE PAIN

## 2021-12-20 ASSESSMENT — FIBROSIS 4 INDEX: FIB4 SCORE: 0.64

## 2021-12-20 ASSESSMENT — PATIENT HEALTH QUESTIONNAIRE - PHQ9: CLINICAL INTERPRETATION OF PHQ2 SCORE: 0

## 2021-12-20 NOTE — PROCEDURES
Patient Name: Giuseppe Swift  : 1975  Date of Service: 2021    Physician/s: Neela Dillard MD    Pre-operative Diagnosis: Myalgia (M79.1)    Post-operative Diagnosis: Myalgia (M79.1)    Procedure: trigger point injections of the following muscles:    Site R L   Splenius capitis x    Splenius cervicis     Sternocleidomastoid     Rhomboids     Levator scapulae     Pectoralis minor     Pectoralis major     Serratus anterior     Teres major/minor     Quadratus lumborum     Paravertebral, cervical     Paravertebral, thoracic     Paravertebral, lumbar     Gluteus eduar     Gluteus medius     Gluteus minimus     Tensor fascia alan     Vastus lateralis     Adductor albaro     Adductor longus     Occipitalis     Cervical paraspinal     Trapezius, upper x    Trapezius, mid x    Trapezius, lower       Description of procedure:    The risks, benefits, and alternatives of the procedure were reviewed and discussed with the patient.  Written informed consent was freely obtained. A pre-procedural time-out was conducted by the physician verifying patient’s identity, procedure to be performed, procedure site and side, and allergy verification. Appropriate equipment was determined to be in place for the procedure.     In the office suite exam room the patient was placed in a seated position and the skin areas for injection over the above muscles were marked. A total of 6 areas of pain were identified for injection. The areas of pain were then prepped and draped in the usual sterile fashion. A solution was prepared with 5 mL of 1% lidocaine and 5 mL of 0.5% bupivacaine . Ultrasound was confirmed to view the adjacent structures for blood vessels and nerves and to confirm the needle path was not within the structures nor was it too deep to be within the lung. A 27g needle was placed into each of the markings at the areas above under ultrasound guidance with an in plane approach.. After negative aspiration,  approximately 1.5-2 mL of the above solution was injected. The needle was removed intact after each trigger point injection, and the patient's back was covered with a 4x4 gauze, the area was cleansed with sterile normal saline, and a dressing was applied. There were no complications noted. The images were uploaded to our media tab for permanent storage.    Neela Dillard MD  Interventional Pain Management  Physical Medicine and Rehabilitation  [facility]

## 2021-12-30 ENCOUNTER — OFFICE VISIT (OUTPATIENT)
Dept: URGENT CARE | Facility: PHYSICIAN GROUP | Age: 46
End: 2021-12-30
Payer: COMMERCIAL

## 2021-12-30 ENCOUNTER — HOSPITAL ENCOUNTER (OUTPATIENT)
Facility: MEDICAL CENTER | Age: 46
End: 2021-12-30
Attending: STUDENT IN AN ORGANIZED HEALTH CARE EDUCATION/TRAINING PROGRAM
Payer: COMMERCIAL

## 2021-12-30 VITALS
BODY MASS INDEX: 35.84 KG/M2 | HEIGHT: 69 IN | DIASTOLIC BLOOD PRESSURE: 72 MMHG | RESPIRATION RATE: 24 BRPM | TEMPERATURE: 97 F | WEIGHT: 242 LBS | OXYGEN SATURATION: 92 % | HEART RATE: 60 BPM | SYSTOLIC BLOOD PRESSURE: 148 MMHG

## 2021-12-30 DIAGNOSIS — J06.9 VIRAL URI WITH COUGH: ICD-10-CM

## 2021-12-30 DIAGNOSIS — Z20.822 COVID-19 RULED OUT: ICD-10-CM

## 2021-12-30 LAB
EXTERNAL QUALITY CONTROL: NORMAL
SARS-COV+SARS-COV-2 AG RESP QL IA.RAPID: NEGATIVE

## 2021-12-30 PROCEDURE — U0003 INFECTIOUS AGENT DETECTION BY NUCLEIC ACID (DNA OR RNA); SEVERE ACUTE RESPIRATORY SYNDROME CORONAVIRUS 2 (SARS-COV-2) (CORONAVIRUS DISEASE [COVID-19]), AMPLIFIED PROBE TECHNIQUE, MAKING USE OF HIGH THROUGHPUT TECHNOLOGIES AS DESCRIBED BY CMS-2020-01-R: HCPCS

## 2021-12-30 PROCEDURE — 99213 OFFICE O/P EST LOW 20 MIN: CPT | Mod: CS | Performed by: STUDENT IN AN ORGANIZED HEALTH CARE EDUCATION/TRAINING PROGRAM

## 2021-12-30 PROCEDURE — 87426 SARSCOV CORONAVIRUS AG IA: CPT | Performed by: STUDENT IN AN ORGANIZED HEALTH CARE EDUCATION/TRAINING PROGRAM

## 2021-12-30 PROCEDURE — U0005 INFEC AGEN DETEC AMPLI PROBE: HCPCS

## 2021-12-30 ASSESSMENT — FIBROSIS 4 INDEX: FIB4 SCORE: 0.64

## 2021-12-30 NOTE — LETTER
January 6, 2022       Patient: Giuseppe Swift   YOB: 1975   Date of Visit: 12/30/2021         To Whom It May Concern:    Giuseppe Swift tested positive for Covid and is cleared to return to work on 1/10/2022 if symptoms have resolved.    If you have any questions or concerns, please don't hesitate to call [phone]          Sincerely,          Fernando Colindres D.O.  Electronically Signed

## 2021-12-31 LAB
COVID ORDER STATUS COVID19: NORMAL
SARS-COV-2 RNA RESP QL NAA+PROBE: DETECTED
SPECIMEN SOURCE: ABNORMAL

## 2021-12-31 NOTE — PROGRESS NOTES
Subjective:   CHIEF COMPLAINT  Chief Complaint   Patient presents with   • Nausea     4x days   • Body Aches     chills;    • Headache   • Cough     dry   • Diarrhea       HPI  Giuseppe Bhandari is a 46 y.o. male who presents with a chief complaint of runny nose, nasal congestion, dry cough associated with body ache, fatigue and diarrhea.  Diarrhea has resolved.  Symptoms started 3 days ago.  His fiancée just tested positive for Covid.  Patient has been taking ibuprofen and Tylenol which provide some relief of symptoms.  Positive ROS for shortness of breath but no wheezing.  Patient says he took his oxygen last night which dropped to a low of 88%.  Patient is fully immunized against Covid.    REVIEW OF SYSTEMS  General: Admits body aches and chills.    GI: no nausea or vomiting  See HPI for further details.     PAST MEDICAL HISTORY  Patient Active Problem List    Diagnosis Date Noted   • Chronic fatigue 09/28/2021   • Need for DTaP vaccine 09/28/2021   • Stress 01/25/2021   • Acquired hypothyroidism 09/11/2019   • Diabetes mellitus (HCC) 09/11/2019   • Obesity (BMI 30-39.9) 08/16/2019   • GERD (gastroesophageal reflux disease) 08/16/2019   • Coronary artery disease involving native coronary artery of native heart without angina pectoris 07/11/2019   • Pure hypercholesterolemia 07/11/2019   • Low HDL (under 40) 07/11/2019   • Pre-hypertension 07/11/2019       SURGICAL HISTORY   has a past surgical history that includes fusion, spine, lumbar, plif (2004); vasectomy (2007); upper gi endoscopy,diagnosis (2/22/2021); and egd w/endoscopic ultrasound (2/22/2021).    ALLERGIES  Allergies   Allergen Reactions   • Iodine Rash and Itching     Pt. States he broke out from IV contrast    • Latex Swelling     Pt. States he also gets blisters   • Cat Hair Extract Anxiety, Hives, Itching, Rash, Runny Nose, Shortness of Breath and Swelling   • Lisinopril      Cough   • Pineapple    • Shellfish Allergy Anaphylaxis       CURRENT  "MEDICATIONS  Home Medications     Reviewed by Robert Gaxiola, Med Ass't (Medical Assistant) on 12/30/21 at 1629  Med List Status: <None>   Medication Last Dose Status   acyclovir (ZOVIRAX) 400 MG tablet Taking Active   ADULT ASPIRIN LOW STRENGTH PO Taking Active   atorvastatin (LIPITOR) 80 MG tablet Taking Active   cyclobenzaprine (FLEXERIL) 10 mg Tab Taking Active   fexofenadine (ALLEGRA) 180 MG tablet Taking Active   lansoprazole (PREVACID) 30 MG CAPSULE DELAYED RELEASE Taking Active   levothyroxine (SYNTHROID) 75 MCG Tab Taking Active   losartan (COZAAR) 50 MG Tab Taking Active   Melatonin 10 MG Tab Taking Active   metoprolol SR (TOPROL XL) 25 MG TABLET SR 24 HR Taking Active   Multiple Vitamins-Minerals (MULTIVITAMIN ADULT PO) Taking Active                SOCIAL HISTORY  Social History     Tobacco Use   • Smoking status: Never Smoker   • Smokeless tobacco: Never Used   Vaping Use   • Vaping Use: Never used   Substance and Sexual Activity   • Alcohol use: Not Currently     Comment: occasional, rare   • Drug use: No   • Sexual activity: Yes     Partners: Female     Birth control/protection: Surgical       FAMILY HISTORY  Family History   Problem Relation Age of Onset   • Diabetes Mother    • No Known Problems Father           Objective:   PHYSICAL EXAM  VITAL SIGNS: /72 (BP Location: Right arm, Patient Position: Sitting, BP Cuff Size: Adult)   Pulse 60   Temp 36.1 °C (97 °F) (Temporal)   Resp (!) 24   Ht 1.753 m (5' 9\")   Wt 110 kg (242 lb)   SpO2 92%   BMI 35.74 kg/m²     Gen: no acute distress, normal voice  Skin: dry, intact, moist mucosal membranes  Lungs: CTAB w/ symmetric expansion  CV: RRR w/o murmurs or clicks  Psych: normal affect, normal judgement, alert, awake    Assessment/Plan:     1. Viral URI with cough  COVID/SARS CoV-2 PCR    POCT SARS-COV Antigen [facility] Manual Result   2. COVID-19 ruled out  COVID/SARS CoV-2 PCR    POCT SARS-COV Antigen [facility] Manual Result   46-year-old male with history " of CAD/ MI status post stent placement, HLD, DM 2 and hypertension presents with Covid-like symptoms after known exposure with his fijimboe.  Rapid Covid antigen test in clinic was negative however high suspicion for false negative.  Patient is fully immunized against Covid but given his PMH is certainly a high risk individual for severe Covid therefore if PCR returns positive will send referral for regen-cov.  -Ordered Covid.  Results will be sent through [employer].  -Instructed to quarantine until Covid results have been returned  -1 Aleve twice daily plus Tylenol 1000 mg 3 times daily  -Discussed at length red flags and return precautions.  Patient verbalized his understanding.  All questions were answered.    Differential diagnosis, natural history, supportive care, and indications for immediate follow-up discussed. Patient agrees with the plan of care.    Follow-up as needed if symptoms worsen or fail to improve to PCP, Urgent care or Emergency Room.       Please note that this dictation was created using voice recognition software. I have made a reasonable attempt to correct obvious errors, but I expect that there are errors of grammar and possibly content that I did not discover before finalizing the note.

## 2022-01-03 DIAGNOSIS — U07.1 COVID-19 VIRUS DETECTED: ICD-10-CM

## 2022-01-03 RX ORDER — CASIRIVIMAB 1332 MG/11.1ML
300 INJECTION, SOLUTION, CONCENTRATE INTRAVENOUS ONCE
Status: CANCELLED | OUTPATIENT
Start: 2022-01-03 | End: 2022-01-03

## 2022-01-03 RX ORDER — IMDEVIMAB 1332 MG/11.1ML
300 INJECTION, SOLUTION, CONCENTRATE INTRAVENOUS ONCE
Status: CANCELLED | OUTPATIENT
Start: 2022-01-03 | End: 2022-01-03

## 2022-01-05 ENCOUNTER — OUTPATIENT INFUSION SERVICES (OUTPATIENT)
Dept: ONCOLOGY | Facility: MEDICAL CENTER | Age: 47
End: 2022-01-05
Attending: STUDENT IN AN ORGANIZED HEALTH CARE EDUCATION/TRAINING PROGRAM
Payer: COMMERCIAL

## 2022-01-05 VITALS
HEART RATE: 57 BPM | RESPIRATION RATE: 16 BRPM | SYSTOLIC BLOOD PRESSURE: 132 MMHG | OXYGEN SATURATION: 95 % | TEMPERATURE: 98.1 F | DIASTOLIC BLOOD PRESSURE: 82 MMHG

## 2022-01-05 DIAGNOSIS — U07.1 COVID-19 VIRUS DETECTED: ICD-10-CM

## 2022-01-05 PROCEDURE — 700111 HCHG RX REV CODE 636 W/ 250 OVERRIDE (IP): Performed by: STUDENT IN AN ORGANIZED HEALTH CARE EDUCATION/TRAINING PROGRAM

## 2022-01-05 PROCEDURE — 96372 THER/PROPH/DIAG INJ SC/IM: CPT

## 2022-01-05 PROCEDURE — M0243 CASIRIVI AND IMDEVI INFUSION: HCPCS

## 2022-01-05 RX ORDER — IMDEVIMAB 1332 MG/11.1ML
300 INJECTION, SOLUTION, CONCENTRATE INTRAVENOUS ONCE
Status: COMPLETED | OUTPATIENT
Start: 2022-01-05 | End: 2022-01-05

## 2022-01-05 RX ORDER — CASIRIVIMAB 1332 MG/11.1ML
300 INJECTION, SOLUTION, CONCENTRATE INTRAVENOUS ONCE
Status: COMPLETED | OUTPATIENT
Start: 2022-01-05 | End: 2022-01-05

## 2022-01-05 RX ORDER — CASIRIVIMAB 1332 MG/11.1ML
300 INJECTION, SOLUTION, CONCENTRATE INTRAVENOUS ONCE
Status: CANCELLED | OUTPATIENT
Start: 2022-01-05 | End: 2022-01-05

## 2022-01-05 RX ORDER — IMDEVIMAB 1332 MG/11.1ML
300 INJECTION, SOLUTION, CONCENTRATE INTRAVENOUS ONCE
Status: CANCELLED | OUTPATIENT
Start: 2022-01-05 | End: 2022-01-05

## 2022-01-05 RX ADMIN — CASIRIVIMAB 300 MG: 1332 INJECTION, SOLUTION, CONCENTRATE INTRAVENOUS at 08:41

## 2022-01-05 RX ADMIN — IMDEVIMAB 300 MG: 1332 INJECTION, SOLUTION, CONCENTRATE INTRAVENOUS at 08:42

## 2022-01-05 NOTE — PROGRESS NOTES
30min post injection vital signs stable.  Patient comfortable, injection sites clean and dry.  Will continue to monitor and round frequently.

## 2022-01-05 NOTE — PROGRESS NOTES
Injections administered. Patient tolerated well;  injection sites clean and dry.  Frequent rounding in effect.

## 2022-01-05 NOTE — PROGRESS NOTES
Final vital sign check stable, injection sites clean and dry. Home care instructions discussed.  Patient discharged from clinic; ambulated independently with all belongings.

## 2022-01-10 ENCOUNTER — OFFICE VISIT (OUTPATIENT)
Dept: URGENT CARE | Facility: PHYSICIAN GROUP | Age: 47
End: 2022-01-10
Payer: COMMERCIAL

## 2022-01-10 VITALS
WEIGHT: 248 LBS | SYSTOLIC BLOOD PRESSURE: 148 MMHG | RESPIRATION RATE: 20 BRPM | HEART RATE: 88 BPM | TEMPERATURE: 97.7 F | OXYGEN SATURATION: 98 % | BODY MASS INDEX: 36.73 KG/M2 | HEIGHT: 69 IN | DIASTOLIC BLOOD PRESSURE: 84 MMHG

## 2022-01-10 DIAGNOSIS — R03.0 ELEVATED BLOOD PRESSURE READING: ICD-10-CM

## 2022-01-10 DIAGNOSIS — U07.1 COVID: ICD-10-CM

## 2022-01-10 PROCEDURE — 99214 OFFICE O/P EST MOD 30 MIN: CPT | Performed by: FAMILY MEDICINE

## 2022-01-10 ASSESSMENT — FIBROSIS 4 INDEX: FIB4 SCORE: 0.64

## 2022-01-10 NOTE — PROGRESS NOTES
"  Subjective:      46 y.o. male presents to urgent care for continued cold symptoms after COVID diagnosis.  He was diagnosed with COVID 12/30/2021.  He continues to have cough, shortness of breath, headache, fevers, and loss of smell and taste.  He did have Regeneron infusion 1/3/2022, unfortunately this only left him feeling about 50% better.  He continues with Vicks vapor rub, Sudafed, and Advil Cold and Sinus.  These are only helping very minimally with his symptoms.  He remains so fatigued, that he reports needing a nap even after just taking a shower. He denies any tobacco product use.  No history of asthma or COPD.  He is fully vaccinated against COVID.      Blood pressure is elevated today in urgent care.  He does have a history of hypertension for which he takes metoprolol and losartan.  He denies any chest pain, palpitations, or shortness of breath.    He denies any other questions or concerns at this time.    Current problem list, medication, and past medical/surgical history were reviewed in Epic.    ROS  See HPI     Objective:      /84 (BP Location: Left arm, Patient Position: Sitting, BP Cuff Size: Adult long)   Pulse 88   Temp 36.5 °C (97.7 °F) (Temporal)   Resp 20   Ht 1.753 m (5' 9\")   Wt 112 kg (248 lb)   SpO2 98%   BMI 36.62 kg/m²     Physical Exam  Constitutional:       General: He is not in acute distress.     Appearance: He is not diaphoretic.   Cardiovascular:      Rate and Rhythm: Normal rate and regular rhythm.      Heart sounds: Normal heart sounds.   Pulmonary:      Effort: Pulmonary effort is normal. No respiratory distress.      Breath sounds: Normal breath sounds.   Neurological:      Mental Status: He is alert.   Psychiatric:         Mood and Affect: Affect normal.         Judgment: Judgment normal.       Assessment/Plan:     1. COVID  Patient continues to be very symptomatic.  At this time I think he needs to quarantine further, work note has been provided.    2. Elevated " blood pressure reading  Asymptomatic.  Referral back to PCP has been placed.  - Referral to establish with [facility]CP      Instructed to return to Urgent Care or nearest Emergency Department if symptoms fail to improve, for any change in condition, further concerns, or new concerning symptoms. Patient states understanding of the plan of care and discharge instructions.    Heike Hawthorne M.D.

## 2022-01-16 NOTE — PROGRESS NOTES
Follow-up patient Note    Interventional Pain and Spine  Physiatry (Physical Medicine and Rehabilitation)     Patient Name: Giuseppe Swift  : 1975  Date of Service: 22      Chief Complaint:   Chief Complaint   Patient presents with   • Follow-Up     post TPI f/u       HISTORY (21)  Giuseppe Swift is a 46 y.o. RHD male who presents today with pain at his left neck, left upper arm, and left forearm. He was originally diagnosed with lateral epicondylitis by Dr. Mckenna on 11/15/21.  Over time his pain worsened worsened to also encompass left upper arm and left posterior shoulder. He has dealt with this left elbow pain for multiple years and notes that usually it occurs after weightlifting. His current left elbow pain started after doing skullcrushers approximately 6 months ago. He typically takes a break for many months and then gets back to weightlifting.      He states his pain at his left neck, left upper arm, and left forearm feels like tightness, throbbing, and burning. He also reports numbness, tingling, and pins and needles in his left arm and hand, all fingers. This numbness and tingling can be provoked by palpating different parts of his left upper trapezius. During the course of the day, his pain at its best-worse level is 9-10/10, respectively. Pain right now is 9/10 on the numeric pain scale. Pain worsens with sitting, standing, walking, bending forward, bending backwards, side bending or twisting, walking upstairs and walking downstairs and improves with nothing. His pain interferes somewhat with ADLs. The patient endorses pain limited diffuse weakness of his left upper extremity and has had to stop lifting weights. He otherwise denies new weakness, numbness, or bladder/bowel incontinence      Of note he reports history of bilateral upper extremity nerve injury that was seen on EMG back performed when [facility] was known as [facility].  He states he sustained this nerve  "injury from incorrect positioning during L5-S1 fusion surgery.  I am unable to find this EMG in chart review today.     [facility] at [facility]     The patient has not done physical therapy for this problem    HPI  Today's visit:  Giuseppe Swift is a 46 y.o. male with The primary encounter diagnosis was Myalgia. Diagnoses of Suprascapular neuropathy, left and Cervicalgia were also pertinent to this visit.    S/p TPI on 12/20/21. Had an itchy red rash lasting for 3 days.  He has received lidocaine in the past so he could be sensitive to chlorhexidine or bupivacaine.  Had transient improvement in pain but states he and his wife then contracted COVID-19.  They have been relatively physically inactive while recovering from COVID and just recently started feeling better.    Because of john COVID-19, he has not yet started physical therapy.  He is amenable to starting PT soon.    He endorses continued pain at his upper back, worst at his bilateral base of the neck and his bilateral paracervical muscles.  He does have continued pain at his left suprascapular notch region that radiates to his scapula.  He does endorse weakness with some left shoulder movements including external rotation.    The patient denies new weakness, numbness, or bladder/bowel incontinence    Procedure history:  - 12/20/21 TPI -improvement in pain until he contracted COVID-19      ROS:   Red Flags ROS:   Fever, Chills, Sweats: Denies  Involuntary Weight Loss: Denies  Bladder Incontinence: Denies  Bowel Incontinence: denies  Saddle Anesthesia: Denies    All other systems reviewed and negative.     PMHx:   Past Medical History:   Diagnosis Date   • Bowel habit changes 02/2021    constipation and diarrhea   • Glaucoma     L eye   • Heart burn    • High cholesterol    • Hypertension    • Myocardial infarct (HCC)    • Snoring    • Tuberculosis 2008    no treatment, \"not active\"       PSHx:   Past Surgical History:   Procedure " Laterality Date   • WI UPPER GI ENDOSCOPY,DIAGNOSIS  2/22/2021    Procedure: GASTROSCOPY;  Surgeon: Jcarlos Thomas M.D.;  Location: SURGERY [facility];  Service: EUS   • EGD W/ENDOSCOPIC ULTRASOUND  2/22/2021    Procedure: EGD, WITH ENDOSCOPIC US - UPPER RADIAL LINEAR;  Surgeon: Jcarlos Thomas M.D.;  Location: SURGERY [facility];  Service: EUS   • VASECTOMY  2007   • FUSION, SPINE, LUMBAR, PLIF  2004    L4-S1       Family Hx:   Family History   Problem Relation Age of Onset   • Diabetes Mother    • No Known Problems Father        Social Hx:  Social History     Socioeconomic History   • Marital status: Single     Spouse name: Not on file   • Number of children: Not on file   • Years of education: Not on file   • Highest education level: Associate degree: occupational, technical, or vocational program   Occupational History   • Not on file   Tobacco Use   • Smoking status: Never Smoker   • Smokeless tobacco: Never Used   Vaping Use   • Vaping Use: Never used   Substance and Sexual Activity   • Alcohol use: Not Currently     Comment: occasional, rare   • Drug use: No   • Sexual activity: Yes     Partners: Female     Birth control/protection: Surgical   Other Topics Concern   • Not on file   Social History Narrative   • Not on file     Social Determinants of Health     Financial Resource Strain: Low Risk    • Difficulty of Paying Living Expenses: Not hard at all   Food Insecurity: No Food Insecurity   • Worried About Running Out of Food in the Last Year: Never true   • Ran Out of Food in the Last Year: Never true   Transportation Needs: No Transportation Needs   • Lack of Transportation (Medical): No   • Lack of Transportation (Non-Medical): No   Physical Activity: Sufficiently Active   • Days of Exercise per Week: 4 days   • Minutes of Exercise per Session: 120 min   Stress: Stress Concern Present   • Feeling of Stress : To some extent   Social Connections: Moderately Integrated   • Frequency of Communication with  Friends and Family: More than three times a week   • Frequency of Social Gatherings with Friends and Family: Once a week   • Attends [employer] Services: More than 4 times per year   • Active Member of Clubs or Organizations: Yes   • Attends Club or Organization Meetings: More than 4 times per year   • Marital Status:    Intimate Partner Violence:    • Fear of Current or Ex-Partner: Not on file   • Emotionally Abused: Not on file   • Physically Abused: Not on file   • Sexually Abused: Not on file   Housing Stability: Unknown   • Unable to Pay for Housing in the Last Year: No   • Number of Places Lived in the Last Year: Not on file   • Unstable Housing in the Last Year: No       Allergies:  Allergies   Allergen Reactions   • Iodine Rash and Itching     Pt. States he broke out from IV contrast    • Latex Swelling     Pt. States he also gets blisters   • Cat Hair Extract Anxiety, Hives, Itching, Rash, Runny Nose, Shortness of Breath and Swelling   • Lisinopril      Cough   • Pineapple    • Shellfish Allergy Anaphylaxis       Medications: reviewed on epic.   Outpatient Medications Marked as Taking for the 1/17/22 encounter (Office Visit) with Neela Dillard M.D.   Medication Sig Dispense Refill   • cyclobenzaprine (FLEXERIL) 10 mg Tab Take 1 Tablet by mouth 2 times a day as needed. May cause drowsiness (do not operate heavy machinery). 14 Tablet 0   • levothyroxine (SYNTHROID) 75 MCG Tab Take 1 Tablet by mouth every morning on an empty stomach. 90 Tablet 3   • atorvastatin (LIPITOR) 80 MG tablet Take 1 Tablet by mouth every evening. 90 Tablet 7   • losartan (COZAAR) 50 MG Tab Take 1 Tablet by mouth every day. 90 Tablet 7   • metoprolol SR (TOPROL XL) 25 MG TABLET SR 24 HR Take 0.5 Tablets by mouth every day. 45 Tablet 7   • Melatonin 10 MG Tab Take  by mouth.     • lansoprazole (PREVACID) 30 MG CAPSULE DELAYED RELEASE Take 1 capsule by mouth every day. 90 capsule 3   • acyclovir (ZOVIRAX) 400 MG tablet Take 1  "tablet by mouth 2 times a day. 90 tablet 3   • Multiple Vitamins-Minerals (MULTIVITAMIN ADULT PO) Take  by mouth.     • ADULT ASPIRIN LOW STRENGTH PO Take 81 mg by mouth.     • fexofenadine (ALLEGRA) 180 MG tablet Take 180 mg by mouth every day.          Current Outpatient Medications on File Prior to Visit   Medication Sig Dispense Refill   • cyclobenzaprine (FLEXERIL) 10 mg Tab Take 1 Tablet by mouth 2 times a day as needed. May cause drowsiness (do not operate heavy machinery). 14 Tablet 0   • levothyroxine (SYNTHROID) 75 MCG Tab Take 1 Tablet by mouth every morning on an empty stomach. 90 Tablet 3   • atorvastatin (LIPITOR) 80 MG tablet Take 1 Tablet by mouth every evening. 90 Tablet 7   • losartan (COZAAR) 50 MG Tab Take 1 Tablet by mouth every day. 90 Tablet 7   • metoprolol SR (TOPROL XL) 25 MG TABLET SR 24 HR Take 0.5 Tablets by mouth every day. 45 Tablet 7   • Melatonin 10 MG Tab Take  by mouth.     • lansoprazole (PREVACID) 30 MG CAPSULE DELAYED RELEASE Take 1 capsule by mouth every day. 90 capsule 3   • acyclovir (ZOVIRAX) 400 MG tablet Take 1 tablet by mouth 2 times a day. 90 tablet 3   • Multiple Vitamins-Minerals (MULTIVITAMIN ADULT PO) Take  by mouth.     • ADULT ASPIRIN LOW STRENGTH PO Take 81 mg by mouth.     • fexofenadine (ALLEGRA) 180 MG tablet Take 180 mg by mouth every day.       No current facility-administered medications on file prior to visit.         EXAMINATION     Physical Exam:   /78 (BP Location: Right arm, Patient Position: Sitting, BP Cuff Size: Adult long)   Pulse 71   Temp 36.6 °C (97.8 °F) (Temporal)   Resp 16   Ht 1.753 m (5' 9\")   Wt 114 kg (252 lb 3.3 oz)   SpO2 94%     Constitutional:   Body Habitus: Body mass index is 37.24 kg/m².  Cooperation: Fully cooperates with exam  Appearance: Well-groomed, well-nourished.    Eyes: No scleral icterus to suggest severe liver disease, no proptosis to suggest severe hyperthyroidism    ENT -no obvious auditory deficits, no " noticeable facial droop     Skin -no rashes or lesions noted     Respiratory-  breathing comfortably on room air, no audible wheezing    Cardiovascular-distal extremities warm and well perfused.  No lower extremity edema is noted.     Gastrointestinal - no obvious abdominal masses, non-distended    Psychiatric- alert and oriented ×3. Normal affect.     Gait - normal gait, no use of ambulatory device, nonantalgic.     Musculoskeletal and Neuro -      Cervical spine   Inspection: No deformities of the skin over the cervical spine. No rashes or lesions.     full active range of motion in all directions     Spurling's sign  negative bilaterally  No signs of muscular atrophy in bilateral upper extremities       Tenderness to palpation at midline of cervical spine, bilateral upper trapezius, and paracervical muscles bilaterally and left suprascapular notch.     Key points for the international standards for neurological classification of spinal cord injury (ISNCSCI) to light touch.      Dermatome R L   C4 2 2   C5 2 2   C6 2 2   C7 2 2   C8 2 2   T1 2 2   T2 2 2      Left shoulder external rotation 4-/5  Left shoulder internal rotation 4+/5     Previous exam 12/13/2021  Palpation of left biceps, left triceps, left upper trapezius reproduces radiating pain down left arm with associated numbness and tingling.        Motor Exam Upper Extremities   ? Myotome R L   Shoulder abduction C5 5 4*   Elbow flexion C5 5 4*   Wrist extension C6 5 4*   Elbow extension C7 5 4-*   Finger flexion C8 5 4*   Finger abduction T1 5 5   *pain limited      Reflexes  ?   R L   Biceps   2+ 2+   Brachioradialis   2+ 2+      Butts's sign negative bilaterally       MEDICAL DECISION MAKING    Medical records review: see under HPI section.     DATA    Labs: No new labs available for review since last visit.    Lab Results   Component Value Date/Time    SODIUM 138 02/16/2021 02:14 PM    POTASSIUM 4.0 02/16/2021 02:14 PM    CHLORIDE 102 02/16/2021  02:14 PM    CO2 24 02/16/2021 02:14 PM    ANION 12.0 02/16/2021 02:14 PM    GLUCOSE 88 02/16/2021 02:14 PM    BUN 15 02/16/2021 02:14 PM    CREATININE 0.85 02/16/2021 02:14 PM    CREATININE 1.0 01/26/2006 01:23 PM    CALCIUM 9.3 02/16/2021 02:14 PM    ASTSGOT 21 11/12/2020 08:09 AM    ALTSGPT 44 11/12/2020 08:09 AM    TBILIRUBIN 0.4 11/12/2020 08:09 AM    ALBUMIN 4.3 11/12/2020 08:09 AM    TOTPROTEIN 6.9 11/12/2020 08:09 AM    GLOBULIN 2.6 11/12/2020 08:09 AM    AGRATIO 1.7 11/12/2020 08:09 AM       Lab Results   Component Value Date/Time    PROTHROMBTM 13.0 11/12/2020 08:09 AM    INR 0.95 11/12/2020 08:09 AM        Lab Results   Component Value Date/Time    WBC 9.6 09/28/2021 12:35 PM    RBC 5.35 09/28/2021 12:35 PM    HEMOGLOBIN 15.9 09/28/2021 12:35 PM    HEMATOCRIT 47.5 09/28/2021 12:35 PM    MCV 88.8 09/28/2021 12:35 PM    MCH 29.7 09/28/2021 12:35 PM    MCHC 33.5 (L) 09/28/2021 12:35 PM    MPV 10.7 09/28/2021 12:35 PM    NEUTSPOLYS 61.80 09/28/2021 12:35 PM    LYMPHOCYTES 30.10 09/28/2021 12:35 PM    MONOCYTES 6.20 09/28/2021 12:35 PM    EOSINOPHILS 0.90 09/28/2021 12:35 PM    BASOPHILS 0.60 09/28/2021 12:35 PM        Lab Results   Component Value Date/Time    HBA1C 7.0 (H) 09/28/2021 12:35 PM        Imaging:   I personally reviewed following images, these are my reads  MRI cervical spine 11/16/21  No significant central canal stenosis or neuroforaminal stenosis at any level        IMAGING radiology reads. I reviewed the following radiology reads   Results for orders placed during the hospital encounter of 11/16/21     MR-CERVICAL SPINE-W/O     Impression  Mild endplate degenerative changes as described above. There is no significant spinal canal stenosis or foraminal narrowing.                             Results for orders placed in visit on 11/15/21     DX-CERVICAL SPINE-2 OR 3 VIEWS     Impression  Negative cervical spine series.         Diagnosis  Visit Diagnoses     ICD-10-CM   1. Myalgia  M79.10   2.  Suprascapular neuropathy, left  G56.82   3. Cervicalgia  M54.2         ASSESSMENT AND PLAN:  Giuseppe Swift is a 46 y.o. male with history of CAD, prehypertension, obesity, GERD, diabetes, hypothyroidism who presents with many months of pain in his left upper shoulder and left upper arm. He also has numbness and tingling from his left upper trapezius down to his fingers which appears to be in a nondermatomal distribution and not in the distribution of a peripheral nerve, elicited with generalized palpation of various muscles including his left upper trapezius, left biceps, left triceps, suggestive of myofascial pain with a component of referred pain.    He also has tenderness to palpation at his left suprascapular notch corresponding to underlying suprascapular nerve with pain radiating from this location to his scapula and weakness with left shoulder external and internal rotation, suggestive of left suprascapular neuropathy.  Limited US evaluation while doing trigger point injections on 12/20/21 did not reveal an cyst or space occupying lesion nearby.       Giuseppe was seen today for follow-up.    Diagnoses and all orders for this visit:    Myalgia  -     Referral to Physical Therapy    Suprascapular neuropathy, left  -     Referral to Physical Therapy    Cervicalgia  -     Referral to Physical Therapy          The above note documents my personal evaluation of this patient. In addition, I have reviewed and confirmed with the patient and MA the supportive information documented in today's Patient Health Questionnaire and Office Note.       PLAN  Physical Therapy: I reordered physical therapy to focus on strengthening and stretching as well as a home exercise program, also to include patient's new diagnosis of left suprascapular neuropathy.  Discussed that his physical therapy program may include rotator cuff strengthening.     Medications:  -Okay to continue current medications.  Patient would like to avoid  adding muscle relaxants at this time.     Interventions: Trigger point injections with ultrasound guidance PRN.  In the future would avoid bupivacaine and possibly chlorhexidine given possible allergy to this.  Patient endorsed 3 days of a pruritic rash after the previous trigger point injections which could be attributable to bupivacaine or chlorhexidine since he does not recall receiving this in the past but has received lidocaine.      Of note he reports history of bilateral upper extremity nerve injury that was seen on EMG back performed when [facility] was known as [facility].  He states he sustained this nerve injury from incorrect positioning during L5-S1 fusion surgery.  I am unable to find this EMG in chart review.    Follow-up: Return in about 2 months (around 3/17/2022). or earlier as needed.  Discussed that patient may call my office if he would like to be scheduled for a trigger point injection earlier.    Orders Placed This Encounter   • Referral to Physical Therapy       Neela Dillard MD  Interventional Pain Management  Physical Medicine and Rehabilitation  [facility]    Total time: 22 minutes. I spent greater than 50% of the time for patient care and coordination on this date, including face-to-face time with the patient as per assessment and plan above.     Please note that this dictation was created using voice recognition software. I have made every reasonable attempt to correct obvious errors, but I expect that there are errors of grammar and possibly content that I did not discover before finalizing the note.

## 2022-01-17 ENCOUNTER — OFFICE VISIT (OUTPATIENT)
Dept: PHYSICAL MEDICINE AND REHAB | Facility: MEDICAL CENTER | Age: 47
End: 2022-01-17
Payer: COMMERCIAL

## 2022-01-17 VITALS
SYSTOLIC BLOOD PRESSURE: 130 MMHG | RESPIRATION RATE: 16 BRPM | OXYGEN SATURATION: 94 % | TEMPERATURE: 97.8 F | BODY MASS INDEX: 37.36 KG/M2 | WEIGHT: 252.21 LBS | HEART RATE: 71 BPM | DIASTOLIC BLOOD PRESSURE: 78 MMHG | HEIGHT: 69 IN

## 2022-01-17 DIAGNOSIS — M54.2 CERVICALGIA: ICD-10-CM

## 2022-01-17 DIAGNOSIS — G56.82 SUPRASCAPULAR NEUROPATHY, LEFT: ICD-10-CM

## 2022-01-17 DIAGNOSIS — M79.10 MYALGIA: Primary | ICD-10-CM

## 2022-01-17 PROCEDURE — 99213 OFFICE O/P EST LOW 20 MIN: CPT | Performed by: STUDENT IN AN ORGANIZED HEALTH CARE EDUCATION/TRAINING PROGRAM

## 2022-01-17 ASSESSMENT — FIBROSIS 4 INDEX: FIB4 SCORE: 0.64

## 2022-01-17 ASSESSMENT — PAIN SCALES - GENERAL: PAINLEVEL: 7=MODERATE-SEVERE PAIN

## 2022-01-17 ASSESSMENT — PATIENT HEALTH QUESTIONNAIRE - PHQ9: CLINICAL INTERPRETATION OF PHQ2 SCORE: 0

## 2022-04-05 ENCOUNTER — PHYSICAL THERAPY (OUTPATIENT)
Dept: PHYSICAL THERAPY | Facility: REHABILITATION | Age: 47
End: 2022-04-05
Attending: STUDENT IN AN ORGANIZED HEALTH CARE EDUCATION/TRAINING PROGRAM
Payer: COMMERCIAL

## 2022-04-05 DIAGNOSIS — M79.10 MYALGIA: ICD-10-CM

## 2022-04-05 DIAGNOSIS — M54.2 CERVICALGIA: ICD-10-CM

## 2022-04-05 DIAGNOSIS — G56.82 SUPRASCAPULAR NEUROPATHY, LEFT: ICD-10-CM

## 2022-04-05 PROCEDURE — 97014 ELECTRIC STIMULATION THERAPY: CPT

## 2022-04-05 PROCEDURE — 97161 PT EVAL LOW COMPLEX 20 MIN: CPT

## 2022-04-05 SDOH — ECONOMIC STABILITY: GENERAL: QUALITY OF LIFE: FAIR

## 2022-04-05 ASSESSMENT — ENCOUNTER SYMPTOMS
PAIN SCALE AT HIGHEST: 9
PAIN SCALE AT LOWEST: 6
PAIN SCALE: 7

## 2022-04-05 NOTE — OP THERAPY EVALUATION
Outpatient Physical Therapy  INITIAL EVALUATION    [facility] [place]  [address]  [place] [address]  Phone:  [phone]  Fax:  [phone]    Date of Evaluation: 2022    Patient: Giuseppe Swift  YOB: 1975  MRN: [id number]     Referring Provider: Neela Dillard M.D.  [address] [facility]  [address]  [place] [address]   Referring Diagnosis Suprascapular neuropathy, left [G56.82];Myalgia [M79.10];Cervicalgia [M54.2]     Time Calculation  Start time: 1330  Stop time: 1415 Time Calculation (min): 45 minutes         Chief Complaint: Neck Problem    Visit Diagnoses     ICD-10-CM   1. Suprascapular neuropathy, left  G56.82   2. Myalgia  M79.10   3. Cervicalgia  M54.2       Date of onset of impairment: 2021    Subjective:   History of Present Illness:     Date of onset:  2021  Quality of life:  Fair  Prior level of function:  Ongoing episodes of L shoulder pain; since 2016  Pain:     Current pain ratin    At best pain ratin    At worst pain ratin  Diagnostic Tests:     MRI studies: abnormal    Activities of Daily Living:     Patient reported ADL status: Limited with L UE use  Limited with self care washing back  Limited with reaching  Limited with gripping  Patient Goals:     Patient goals for therapy:  Increased motion, increased strength and decreased pain    Patient is a 46 y.o. male that presents to therapy with L arm pain. States that symptoms were due to injury, lifting. Reports the pain quality to be dull, constant and are primarily from L UT down to L forearm. Reports that symptoms now worsening. States that aggravating factors are gripping, reaching, self care.  States that easng factors are Tens, ice. Notes finger numbness in digits 5 and 4.    Past Medical History:   Diagnosis Date   • Bowel habit changes 2021    constipation and diarrhea   • Glaucoma     L eye   • Heart burn    • High cholesterol    • Hypertension   "  • Myocardial infarct (HCC)    • Snoring    • Tuberculosis 2008    no treatment, \"not active\"     Past Surgical History:   Procedure Laterality Date   • [place] UPPER GI ENDOSCOPY,DIAGNOSIS  2/22/2021    Procedure: GASTROSCOPY;  Surgeon: Jcarlos Thomas M.D.;  Location: SURGERY [facility];  Service: EUS   • EGD W/ENDOSCOPIC ULTRASOUND  2/22/2021    Procedure: EGD, WITH ENDOSCOPIC US - UPPER RADIAL LINEAR;  Surgeon: Jcarlos Thomas M.D.;  Location: SURGERY [facility];  Service: EUS   • VASECTOMY  2007   • FUSION, SPINE, LUMBAR, PLIF  2004    L4-S1     Social History     Tobacco Use   • Smoking status: Never Smoker   • Smokeless tobacco: Never Used   Substance Use Topics   • Alcohol use: Not Currently     Comment: occasional, rare     Family and Occupational History     Socioeconomic History   • Marital status: Single     Spouse name: Not on file   • Number of children: Not on file   • Years of education: Not on file   • Highest education level: Associate degree: occupational, technical, or vocational program   Occupational History   • Not on file       Objective     Postural Observations  Seated posture: poor  Standing posture: poor        Neurological Testing     Reflexes   Left   Biceps (C5/C6): trace (1+)  Triceps (C7): trace (1+)  Butts's reflex: negative    Right   Biceps (C5/C6): trace (1+)  Triceps (C7): trace (1+)  Butts's reflex: negative    Myotome testing   Cervical (left)   C5 (deltoid): 4- (P)  C6 (biceps): 4- (P)  C7 (triceps): 4 (P)  C8 (thumb extension): 3+  T1 (intrinsics): 5    Cervical (right)   C5 (deltoid): 5  C6 (biceps): 5  C7 (triceps): 5  C8 (thumb extension): 5  T1 (intrinsics): 5    Dermatome testing   Cervical (right)   All right cervical dermatomes intact    Additional Neurological Details  Minor aspect of numbness along L forearm non dermatomal     Palpation   Left   Hypertonic in the cervical paraspinals and upper trapezius.   Tenderness of the upper trapezius.     Right   Hypertonic in " the cervical paraspinals and upper trapezius.     Active Range of Motion     Cervical Spine   Flexion: Active cervical flexion: 24deg.  Extension: Active cervical extension: 23deg.  Left lateral flexion: Active left cervical lateral flexion: 19deg.  Right lateral flexion: Active right cervical lateral flexion: 14deg.  Left rotation: Active left cervical rotation: 25deg.  Right rotation: Active right cervical rotation: 30deg.    Tests   Cervical spine   Negative cervical spine distraction.     Left Spine   Negative alar ligament integrity, Sharp-Maeve test and vertebral artery test.   Additional testing details: Pain limiting all L UE mobility     Left Shoulder   Positive Spurling's sign and ULTT4.     Jeanmarie Cervical Test     Sitting repeated motions:   Retraction in sitting     Symptoms during testing: produces    Symptoms after testing: worse  Repeat retraction in sitting     Symptoms after testing: worse  Retraction with extension in sitting     Symptoms during testing: produces    Symptoms after testing: worse  Flexion in sitting     Symptoms during testing: produces    Symptoms after testing: worse  Repeat flexion in sitting     Symptoms after testing: worse    General Comments     Spine Comments   L side bending: decrease; no change        Therapeutic Exercises (CPT 84119):     1. Trial mini chin tuck, x5-10 every 4 hours if worse DC    2. Trial mini ext, x5-10 every 4 hours if worse DC    3. Trial mini SB L, x5-10 every 4 hours if worse DC    Therapeutic Treatments and Modalities:     1. E Stim Unattended (CPT 23814), IFC to c spine with mhp x15mn 80-150hz    Time-based treatments/modalities:    Physical Therapy Timed Treatment Charges  Therapeutic exercise minutes (CPT 90365): 5 minutes      Assessment, Response and Plan:   Impairments: abnormal muscle tone, abnormal or restricted ROM, activity intolerance, impaired physical strength and pain with function    Assessment details:  Patient presents with  signs and symptoms consistent with a cervical radiculopathy disc vs neural tension. Patient limitations include weakness, decreased ROM, and pain. Patient demonstrated no directional prefrence. There maybe multiple issues at the same time.  Patient will benefit from skilled therapy to improve the aforementioned deficits and decrease further functional decline.   Prognosis: fair    Goals:   Short Term Goals:   1) Patient's cervical rot will improve by 10deg to facilitate improved view of traffic.  2) Patient's bicep strength will improve by a half muscle grade to facilitate improved UE use.  Short term goal time span:  2-4 weeks      Long Term Goals:    1) Patient's symptoms will improve to allow for return to fitness activity with L UE.  2) Patient's SPADI will improve by 10 to demonstrate functional improvement  Long term goal time span:  6-8 weeks    Plan:   Therapy options:  Physical therapy treatment to continue  Planned therapy interventions:  E Stim Unattended (CPT 28603), Manual Therapy (CPT 88597), Neuromuscular Re-education (CPT 05026), Therapeutic Exercise (CPT 59120), Hot or Cold Pack Therapy (CPT 97677) and Mechanical Traction (CPT 50071)  Frequency:  2x week  Duration in weeks:  8  Discussed with:  Patient      Functional Assessment Used  PT Functional Assessment Tool Used: SPADI  PT Functional Assessment Score: 97/130     Referring provider co-signature:  I have reviewed this plan of care and my co-signature certifies the need for services.    Certification Period: 04/05/2022 to  05/31/22    Physician Signature: ________________________________ Date: ______________

## 2022-04-08 NOTE — PROGRESS NOTES
Subjective:   Chief Complaint:   Chief Complaint   Patient presents with   • Coronary Artery Disease     F/V Dx: Coronary artery disease involving native coronary artery of native heart without angina pectoris     Giuseppe Bhandari is a 46 y.o. male who returns for premature CAD/MI/stent, HLP, now DM2, HTN.      He was working out in the gym, had been having chest discomfort he thought was related to lifting weights, would get tired, lightheaded, fatigued, nauseous.   The month before his MI, he thought he had the flu. Was visiting here from [place].   His cardio was being limiting by sign shortness of breath. Was at the gym, struggling, thought he was going to dry home but somehow drove to the ED, he does not really recall making the decision to go to the ED, vaguely recalls being in the ED, they reported he was sweating heavily.    Had NSTEMI October 3, 2018 in [facility] 95% prox LAD that was treated with a 4.5 x 18 mm drug-eluting stent.    There is minimal disease elsewhere.    Echocardiogram demonstrated a normal left ventricular systolic function with an LVEF of 60. Sounds like he may have had a Nuc or CT scan.  Had hives.  Completed DAPT.  On statin, BB, ASA.    Has HLP, on lipitor 80 mg, ldl 60, hdl low. LDL was initially 131.    DM2.    Has HTN, controlled.   Reports cough on lisinopril.  Has an XL BP cuff now.  Does get mildly lightheaded at times upon standing, not limiting.    Prior covid infection.  His O2 level has dropped, cxray was ok.  Resolved, feels 70-75% back to baseline.    PT for left shoulder, arm pain, is musculoskeletal.     He is not limited by chest pain, pressure or tightness with activity.   Reports mild dyspnea on exertion with cardio exercise, has not changed.  No orthopnea or lower extremity swelling.   No significant palpitations.  No  presyncope/syncope.   No symptoms of leg claudication.   No stroke/TIA like symptoms.    Fathers HX not known.  No family history of premature coronary  "artery disease on mother's side.  No prior smoking history.  No history of hypertension.  No history of autoimmune disease such as lupus or rheumatoid arthritis.  No chronic kidney disease.    Recently , has 3 kids with her, much stress, kids in [place].   Works as MA at [employer], he works hard, is a Sr. MA.    DATA REVIEWED by me:  ECG 9-24-21  Sinus, 52    ECG 10-2-18 [place]  Sinus, 98     Echo 10-3-18 [place]  EF 60%, normal wall motion    LHC 10-3-18 [place]  95% prox LAD, [facility] [place] GARRET, 4.5x18 mm, LVEDP 15 mmHg    MRA chest 10-15-19  MRA of the thoracic aorta within normal limits. No aneurysmal dilatation or evidence of dissection.    Most recent labs:     Lab Results   Component Value Date/Time    HEMOGLOBIN 15.9 09/28/2021 12:35 PM    HEMATOCRIT 47.5 09/28/2021 12:35 PM    MCV 88.8 09/28/2021 12:35 PM    INR 0.95 11/12/2020 08:09 AM      Lab Results   Component Value Date/Time    SODIUM 138 02/16/2021 02:14 PM    POTASSIUM 4.0 02/16/2021 02:14 PM    CHLORIDE 102 02/16/2021 02:14 PM    CO2 24 02/16/2021 02:14 PM    GLUCOSE 88 02/16/2021 02:14 PM    BUN 15 02/16/2021 02:14 PM    CREATININE 0.85 02/16/2021 02:14 PM    CREATININE 1.0 01/26/2006 01:23 PM      Lab Results   Component Value Date/Time    ASTSGOT 21 11/12/2020 08:09 AM    ALTSGPT 44 11/12/2020 08:09 AM    ALBUMIN 4.3 11/12/2020 08:09 AM      Lab Results   Component Value Date/Time    CHOLSTRLTOT 137 08/14/2020 09:35 AM    LDL 84 08/14/2020 09:35 AM    HDL 32 (A) 08/14/2020 09:35 AM    TRIGLYCERIDE 104 08/14/2020 09:35 AM       9-7-19 h 15.4, p 217, ha 141, k 4.1, cr 0.98, lfts normal, a1c 6.1. ldl 60, hdl 39, tg 116, tc 122    Past Medical History:   Diagnosis Date   • Bowel habit changes 02/2021    constipation and diarrhea   • Glaucoma     L eye   • Heart burn    • High cholesterol    • Hypertension    • Myocardial infarct (HCC)    • Snoring    • Tuberculosis 2008    no treatment, \"not active\"     Past Surgical History:   Procedure Laterality Date "   • [place] UPPER GI ENDOSCOPY,DIAGNOSIS  2/22/2021    Procedure: GASTROSCOPY;  Surgeon: Jcarlos Thomas M.D.;  Location: SURGERY [facility];  Service: EUS   • EGD W/ENDOSCOPIC ULTRASOUND  2/22/2021    Procedure: EGD, WITH ENDOSCOPIC US - UPPER RADIAL LINEAR;  Surgeon: Jcarlos Thomas M.D.;  Location: SURGERY [facility];  Service: EUS   • VASECTOMY  2007   • FUSION, SPINE, LUMBAR, PLIF  2004    L4-S1     Family History   Problem Relation Age of Onset   • Diabetes Mother    • No Known Problems Father      Social History     Socioeconomic History   • Marital status: Single     Spouse name: Not on file   • Number of children: Not on file   • Years of education: Not on file   • Highest education level: Associate degree: occupational, technical, or vocational program   Occupational History   • Not on file   Tobacco Use   • Smoking status: Never Smoker   • Smokeless tobacco: Never Used   Vaping Use   • Vaping Use: Never used   Substance and Sexual Activity   • Alcohol use: Not Currently     Comment: occasional, rare   • Drug use: No   • Sexual activity: Yes     Partners: Female     Birth control/protection: Surgical   Other Topics Concern   • Not on file   Social History Narrative   • Not on file     Social Determinants of Health     Financial Resource Strain: Not on file   Food Insecurity: Not on file   Transportation Needs: Not on file   Physical Activity: Not on file   Stress: Not on file   Social Connections: Not on file   Intimate Partner Violence: Not on file   Housing Stability: Not on file     Allergies   Allergen Reactions   • Iodine Rash and Itching     Pt. States he broke out from IV contrast    • Latex Swelling     Pt. States he also gets blisters   • Cat Hair Extract Anxiety, Hives, Itching, Rash, Runny Nose, Shortness of Breath and Swelling   • Lisinopril      Cough   • Pineapple    • Shellfish Allergy Anaphylaxis       Current Outpatient Medications   Medication Sig Dispense Refill   • atorvastatin (LIPITOR) 80 MG  "tablet Take 1 Tablet by mouth every evening. 90 Tablet 7   • losartan (COZAAR) 50 MG Tab Take 1 Tablet by mouth every day. 90 Tablet 7   • metoprolol SR (TOPROL XL) 25 MG TABLET SR 24 HR Take 0.5 Tablets by mouth every day. 45 Tablet 7   • levothyroxine (SYNTHROID) 75 MCG Tab Take 1 Tablet by mouth every morning on an empty stomach. 90 Tablet 3   • lansoprazole (PREVACID) 30 MG CAPSULE DELAYED RELEASE Take 1 capsule by mouth every day. 90 capsule 3   • Multiple Vitamins-Minerals (MULTIVITAMIN ADULT PO) Take  by mouth.     • ADULT ASPIRIN LOW STRENGTH PO Take 81 mg by mouth.     • fexofenadine (ALLEGRA) 180 MG tablet Take 180 mg by mouth every day.       No current facility-administered medications for this visit.       ROS    All others systems reviewed and negative.     Objective:     /84 (BP Location: Left arm, Patient Position: Sitting, BP Cuff Size: Large adult)   Pulse 60   Resp 16   Ht 1.753 m (5' 9\")   Wt 112 kg (247 lb)   SpO2 97%  Body mass index is 36.48 kg/m².    Physical Exam   General: No acute distress. Well nourished.  HEENT: EOM grossly intact, no scleral icterus, no pharyngeal erythema.   Neck:  No JVD, no bruits, trachea midline  CVS: RRR. Normal S1, S2. No M/R/G. No LE edema.  2+ radial pulses, 2+ PT pulses  Resp: CTAB. No wheezing or crackles/rhonchi. Normal respiratory effort.  Abdomen: Soft, NT, no solo hepatomegaly.  MSK/Ext: No clubbing or cyanosis.  Skin: Warm and dry, no rashes.  Neurological: CN III-XII grossly intact. No focal deficits.   Psych: A&O x 3, appropriate affect, good judgement    Physical exam performed today and unchanged, except what is noted, compared to 10-22-21      Assessment:     1. Coronary artery disease involving native coronary artery of native heart without angina pectoris     2. Stented coronary artery     3. Mixed hyperlipidemia     4. Other chest pain     5. Prediabetes     6. Pre-hypertension  metoprolol SR (TOPROL XL) 25 MG TABLET SR 24 HR   7. " Pure hypercholesterolemia  atorvastatin (LIPITOR) 80 MG tablet   8. Essential hypertension     9. Type 2 diabetes mellitus with hypercholesterolemia (HCC)     10. Stable angina pectoris (HCC)         Medical Decision Making:  Today's Assessment / Status / Plan:     -Cont aggressive secondary prevention, ASA, statin, BB  -BP controlled  -DM2 now, diet only, consider GLP1 agonist  -We discussed nitro PRN to test symptoms, he is not sure, will have available at any time  -cont to exercise, cardio  -Trying to lose weight  -Working on thyroid  -Gets blood work done with PCP  -RTC 1 year    Written instructions given today:    Please talk with your diabetes provider about oral medications called glucagon-like peptide 1 agonists such as Semaglutide (Ozempic) or Liraglutide (Victoza), there is a mortality benefit with coronary artery disease.    These are medications that you may qualify for that can actually improve your longevity.      Return in about 1 year (around 4/11/2023).    It is my pleasure to participate in the care of Mr. Bhandari.  Please do not hesitate to contact me with questions or concerns.    Jovanna Gee MD, [facility]  Cardiologist [facility]    Please note that this dictation was created using voice recognition software. I have made every reasonable attempt to correct obvious errors, but it is possible there are errors of grammar and possibly content that I did not discover before finalizing the note.

## 2022-04-11 ENCOUNTER — PHYSICAL THERAPY (OUTPATIENT)
Dept: PHYSICAL THERAPY | Facility: REHABILITATION | Age: 47
End: 2022-04-11
Attending: STUDENT IN AN ORGANIZED HEALTH CARE EDUCATION/TRAINING PROGRAM
Payer: COMMERCIAL

## 2022-04-11 ENCOUNTER — OFFICE VISIT (OUTPATIENT)
Dept: CARDIOLOGY | Facility: MEDICAL CENTER | Age: 47
End: 2022-04-11
Payer: COMMERCIAL

## 2022-04-11 VITALS
SYSTOLIC BLOOD PRESSURE: 120 MMHG | RESPIRATION RATE: 16 BRPM | OXYGEN SATURATION: 97 % | HEART RATE: 60 BPM | WEIGHT: 247 LBS | DIASTOLIC BLOOD PRESSURE: 84 MMHG | HEIGHT: 69 IN | BODY MASS INDEX: 36.58 KG/M2

## 2022-04-11 DIAGNOSIS — I10 ESSENTIAL HYPERTENSION: ICD-10-CM

## 2022-04-11 DIAGNOSIS — R03.0 PRE-HYPERTENSION: ICD-10-CM

## 2022-04-11 DIAGNOSIS — Z95.5 STENTED CORONARY ARTERY: ICD-10-CM

## 2022-04-11 DIAGNOSIS — E78.2 MIXED HYPERLIPIDEMIA: ICD-10-CM

## 2022-04-11 DIAGNOSIS — I20.89 STABLE ANGINA PECTORIS (HCC): ICD-10-CM

## 2022-04-11 DIAGNOSIS — R73.03 PREDIABETES: ICD-10-CM

## 2022-04-11 DIAGNOSIS — R07.89 OTHER CHEST PAIN: ICD-10-CM

## 2022-04-11 DIAGNOSIS — G56.82 SUPRASCAPULAR NEUROPATHY, LEFT: ICD-10-CM

## 2022-04-11 DIAGNOSIS — I25.10 CORONARY ARTERY DISEASE INVOLVING NATIVE CORONARY ARTERY OF NATIVE HEART WITHOUT ANGINA PECTORIS: ICD-10-CM

## 2022-04-11 DIAGNOSIS — E78.00 TYPE 2 DIABETES MELLITUS WITH HYPERCHOLESTEROLEMIA (HCC): ICD-10-CM

## 2022-04-11 DIAGNOSIS — E78.00 PURE HYPERCHOLESTEROLEMIA: ICD-10-CM

## 2022-04-11 DIAGNOSIS — E11.69 TYPE 2 DIABETES MELLITUS WITH HYPERCHOLESTEROLEMIA (HCC): ICD-10-CM

## 2022-04-11 PROCEDURE — 97110 THERAPEUTIC EXERCISES: CPT

## 2022-04-11 PROCEDURE — 97014 ELECTRIC STIMULATION THERAPY: CPT

## 2022-04-11 PROCEDURE — 97140 MANUAL THERAPY 1/> REGIONS: CPT

## 2022-04-11 PROCEDURE — 99214 OFFICE O/P EST MOD 30 MIN: CPT | Performed by: INTERNAL MEDICINE

## 2022-04-11 RX ORDER — LOSARTAN POTASSIUM 50 MG/1
50 TABLET ORAL DAILY
Qty: 90 TABLET | Refills: 7 | Status: SHIPPED | OUTPATIENT
Start: 2022-04-11

## 2022-04-11 RX ORDER — ATORVASTATIN CALCIUM 80 MG/1
80 TABLET, FILM COATED ORAL EVERY EVENING
Qty: 90 TABLET | Refills: 7 | Status: SHIPPED | OUTPATIENT
Start: 2022-04-11

## 2022-04-11 RX ORDER — METOPROLOL SUCCINATE 25 MG/1
12.5 TABLET, EXTENDED RELEASE ORAL DAILY
Qty: 45 TABLET | Refills: 7 | Status: SHIPPED | OUTPATIENT
Start: 2022-04-11 | End: 2023-10-24

## 2022-04-11 ASSESSMENT — FIBROSIS 4 INDEX: FIB4 SCORE: 0.64

## 2022-04-11 NOTE — OP THERAPY DAILY TREATMENT
Outpatient Physical Therapy  DAILY TREATMENT     [facility] [place]  [address], [address]  [facility] [place] [facility] [address]  Phone:  [phone]  Fax:  [phone]    Date: 04/11/2022    Patient: Giuseppe [place]  YOB: 1975  MRN: [id number]     Time Calculation    Start time: 0900  Stop time: 1000 Time Calculation (min): 60 minutes     Chief Complaint: neck/ left arm problem  Visit #: 2    SUBJECTIVE:  Notes no improvement, notes he has stopped all of the exercises. Symptoms going down arm to 4th and 5th fingers constantly. He has stopped the exercises since Friday.     OBJECTIVE:  Current objective measures: pain with all motions of head and neck          Therapeutic Exercises (CPT 84748):     1. Ulnar nerve glide side to side prayer, x 30, , worsening even with small motions    2. Stick ER/ IR AAROM, x20, worsening, even with small motions    3. Cervical retraction, x25, same no change, potentially worse    4. Ulnar nerve flossing, unable even with reduced motion    5. Supine occ float, 2min, very small movements guarded away from R rotation    6. Self first rib mob, 2min, worsening    7. Instruction for home self traction, 5min    8. Ulnar flossing , x 5 poorly tolerated    9. Shoulder stick extension aarom,  [id number] tolerated    Therapeutic Treatments and Modalities:     1. Manual Therapy (CPT 33088), manual traction, 3min, grade II-III left side opening movements/ lateral glides, 1st rib mobilzation grade II-III, releif reported during traction but not during any other movements    2. E Stim Unattended (CPT 44673), IFC and MHP to cervical spine x 15min supine     Time-based treatments/modalities:    Physical Therapy Timed Treatment Charges  Manual therapy minutes (CPT 84545): 10 minutes  Therapeutic exercise minutes (CPT 58939): 35 minutes      Pain rating (1-10) before treatment:  8  Pain rating (1-10) after treatment:  8    ASSESSMENT:   Response to treatment: All  treatment/ movement poorly tolerated other than traction. If symptoms remain elevated in this fashion treatment may not be appropriate. Positional relief able to occur with self traction. F/u in 2 days.     PLAN/RECOMMENDATIONS:   Plan for treatment: therapy treatment to continue next visit.  Planned interventions for next visit: continue with current treatment.

## 2022-04-11 NOTE — PATIENT INSTRUCTIONS
Please talk with your diabetes provider about oral medications called glucagon-like peptide 1 agonists such as Semaglutide (Ozempic) or Liraglutide (Victoza), there is a mortality benefit with coronary artery disease.    These are medications that you may qualify for that can actually improve your longevity.

## 2022-04-13 ENCOUNTER — PHYSICAL THERAPY (OUTPATIENT)
Dept: PHYSICAL THERAPY | Facility: REHABILITATION | Age: 47
End: 2022-04-13
Attending: STUDENT IN AN ORGANIZED HEALTH CARE EDUCATION/TRAINING PROGRAM
Payer: COMMERCIAL

## 2022-04-13 ENCOUNTER — HOSPITAL ENCOUNTER (OUTPATIENT)
Dept: LAB | Facility: MEDICAL CENTER | Age: 47
End: 2022-04-13
Attending: FAMILY MEDICINE
Payer: COMMERCIAL

## 2022-04-13 DIAGNOSIS — M79.10 MYALGIA: ICD-10-CM

## 2022-04-13 DIAGNOSIS — E03.9 ACQUIRED HYPOTHYROIDISM: ICD-10-CM

## 2022-04-13 DIAGNOSIS — E11.9 TYPE 2 DIABETES MELLITUS WITHOUT COMPLICATION, WITHOUT LONG-TERM CURRENT USE OF INSULIN (HCC): ICD-10-CM

## 2022-04-13 DIAGNOSIS — G56.82 SUPRASCAPULAR NEUROPATHY, LEFT: ICD-10-CM

## 2022-04-13 DIAGNOSIS — M54.2 CERVICALGIA: ICD-10-CM

## 2022-04-13 LAB
EST. AVERAGE GLUCOSE BLD GHB EST-MCNC: 169 MG/DL
HBA1C MFR BLD: 7.5 % (ref 4–5.6)

## 2022-04-13 PROCEDURE — 84443 ASSAY THYROID STIM HORMONE: CPT

## 2022-04-13 PROCEDURE — 97110 THERAPEUTIC EXERCISES: CPT

## 2022-04-13 PROCEDURE — 36415 COLL VENOUS BLD VENIPUNCTURE: CPT

## 2022-04-13 PROCEDURE — 83036 HEMOGLOBIN GLYCOSYLATED A1C: CPT

## 2022-04-13 PROCEDURE — 97014 ELECTRIC STIMULATION THERAPY: CPT

## 2022-04-13 NOTE — OP THERAPY DAILY TREATMENT
Outpatient Physical Therapy  DAILY TREATMENT     [facility] [place]  [address], [address]  [place] [address]  Phone:  [phone]  Fax:  [phone]    Date: 04/13/2022    Patient: Giuseppe [place]  YOB: 1975  MRN: [id number]     Time Calculation    Start time: 1115  Stop time: 1145 Time Calculation (min): 30 minutes         Chief Complaint: Neck Problem    Visit #: 3    SUBJECTIVE:  Patient reports that symptoms continue to be irritated. Patient notes that all movement continues to increase symptoms.     OBJECTIVE:  Current objective measures:           Therapeutic Exercises (CPT 27161):     1. Shoulder pendulums, x20    2. Shoulder dowel flexion, x20    3. Minimal ulnar nerve floss, x10     Therapeutic Treatments and Modalities:     1. E Stim Unattended (CPT 41441), IFC to cervical spine with HP x15mn 80-150hz    Time-based treatments/modalities:    Physical Therapy Timed Treatment Charges  Therapeutic exercise minutes (CPT 12289): 10 minutes      Pain rating (1-10) before treatment:  8  Pain rating (1-10) after treatment:  7    ASSESSMENT:   Response to treatment: Patient continues to respond poorly to therapy. Patient is not tolerating symptoms. Plan to follow up with MD.     PLAN/RECOMMENDATIONS:   Plan for treatment: therapy treatment to continue next visit.  Planned interventions for next visit: continue with current treatment.

## 2022-04-14 ENCOUNTER — TELEPHONE (OUTPATIENT)
Dept: PHYSICAL MEDICINE AND REHAB | Facility: MEDICAL CENTER | Age: 47
End: 2022-04-14
Payer: COMMERCIAL

## 2022-04-14 LAB — TSH SERPL DL<=0.005 MIU/L-ACNC: 2.76 UIU/ML (ref 0.38–5.33)

## 2022-04-14 NOTE — TELEPHONE ENCOUNTER
----- Message from Neela Dillard M.D. sent at 4/13/2022 11:39 AM PDT -----  Thanks for the update Tyrel. I'll try to get him in my clinic on Thurs or Fri to evaluate further.    Jaskaran, please contact the pt to see if he'd like to schedule a f/u appt      ----- Message -----  From: David Davila, PT, DPT  Sent: 4/13/2022  11:30 AM PDT  To: ZENIA Rios [address]    I am the treating therapist for our patient Giuseppe Swift. He is not tolerating any therapy treatment at this time. Gentle AAROM, PROM, and manual therapy all seem to irritate his symptoms. I am wondering if we can do something to calm symptoms down such as a dose pack possibly. His symptoms are presenting similar to a cervical radiculopathy vs a nerve traction injury at this time.  He will be going on vacation for 2-3 weeks in the next two days and I doubt any intervention I have here will ease his symptoms enough for his trip. Thank you for your time. If you would like to follow up via phone do not hesitate to call [phone].     Thank you,    Tyrel

## 2022-04-15 ENCOUNTER — TELEPHONE (OUTPATIENT)
Dept: PHYSICAL MEDICINE AND REHAB | Facility: MEDICAL CENTER | Age: 47
End: 2022-04-15
Payer: COMMERCIAL

## 2022-04-15 DIAGNOSIS — R29.898 LEFT ARM WEAKNESS: ICD-10-CM

## 2022-04-15 DIAGNOSIS — R20.0 NUMBNESS AND TINGLING IN LEFT ARM: Primary | ICD-10-CM

## 2022-04-15 DIAGNOSIS — M79.602 LEFT ARM PAIN: ICD-10-CM

## 2022-04-15 DIAGNOSIS — R20.2 NUMBNESS AND TINGLING IN LEFT ARM: Primary | ICD-10-CM

## 2022-04-15 RX ORDER — MELOXICAM 7.5 MG/1
7.5 TABLET ORAL 2 TIMES DAILY
Qty: 28 TABLET | Refills: 0 | Status: SHIPPED | OUTPATIENT
Start: 2022-04-15 | End: 2022-05-02

## 2022-04-15 NOTE — TELEPHONE ENCOUNTER
Spoke with pt, Giuseppe, who said he would like to try the meloxicam as mentioned, and is interested in doing the EMG if Dr. Dillard would like to place the orders. He would like the meloxicam sent to [facility] on [address].

## 2022-04-15 NOTE — TELEPHONE ENCOUNTER
Ordered EMG given persistent numbness/tingling weakness/pain in left arm and in distribution of left suprascapular nerve.     Also prescribed 2 week course of meloxicam 7.5mg BID for symptoms. Sent to patient's pharmacy.

## 2022-04-15 NOTE — TELEPHONE ENCOUNTER
A medrol dosepak will worsen his diabetes so I'd like to avoid that at this time. I can prescribe meloxicam 7.5mg BID which also has antiinflammatory properties and would have a similar effect on pain, with less SE of worsening his diabetes. I'm not sure which pharmacy he prefers, so please feel free to place an meloxicam order for me to sign.    We did discuss an EMG but felt we could defer that at the time of our visit. I can place another order for an EMG if he'd like.

## 2022-04-15 NOTE — TELEPHONE ENCOUNTER
Call pt and spoke with him about setting up appt. He is currently on vacation and not able to come into the clinic soon.     He says his physical therapist recommended that he begin medrol dosepak, citing how he has a lot of swelling and tightness in his body that is making PT overly difficult. He requests Dr. Dillard send an order for that.     Pt also mentioned having discussed an EMG order with Dr. Dillard, but has not yet heard anything about scheduling it. I do not see the order on his chart.

## 2022-04-19 ENCOUNTER — APPOINTMENT (OUTPATIENT)
Dept: PHYSICAL THERAPY | Facility: REHABILITATION | Age: 47
End: 2022-04-19
Attending: STUDENT IN AN ORGANIZED HEALTH CARE EDUCATION/TRAINING PROGRAM
Payer: COMMERCIAL

## 2022-04-25 ENCOUNTER — APPOINTMENT (OUTPATIENT)
Dept: PHYSICAL THERAPY | Facility: REHABILITATION | Age: 47
End: 2022-04-25
Attending: STUDENT IN AN ORGANIZED HEALTH CARE EDUCATION/TRAINING PROGRAM
Payer: COMMERCIAL

## 2022-04-27 ENCOUNTER — APPOINTMENT (OUTPATIENT)
Dept: PHYSICAL THERAPY | Facility: REHABILITATION | Age: 47
End: 2022-04-27
Attending: STUDENT IN AN ORGANIZED HEALTH CARE EDUCATION/TRAINING PROGRAM
Payer: COMMERCIAL

## 2022-05-02 ENCOUNTER — APPOINTMENT (OUTPATIENT)
Dept: PHYSICAL THERAPY | Facility: REHABILITATION | Age: 47
End: 2022-05-02
Attending: STUDENT IN AN ORGANIZED HEALTH CARE EDUCATION/TRAINING PROGRAM
Payer: COMMERCIAL

## 2022-05-02 ENCOUNTER — OFFICE VISIT (OUTPATIENT)
Dept: MEDICAL GROUP | Facility: PHYSICIAN GROUP | Age: 47
End: 2022-05-02
Payer: COMMERCIAL

## 2022-05-02 VITALS
HEART RATE: 76 BPM | BODY MASS INDEX: 37.07 KG/M2 | OXYGEN SATURATION: 97 % | SYSTOLIC BLOOD PRESSURE: 132 MMHG | HEIGHT: 69 IN | DIASTOLIC BLOOD PRESSURE: 80 MMHG | TEMPERATURE: 98.1 F | RESPIRATION RATE: 20 BRPM | WEIGHT: 250.3 LBS

## 2022-05-02 DIAGNOSIS — E11.9 TYPE 2 DIABETES MELLITUS WITHOUT COMPLICATION, WITHOUT LONG-TERM CURRENT USE OF INSULIN (HCC): ICD-10-CM

## 2022-05-02 DIAGNOSIS — R53.82 CHRONIC FATIGUE: ICD-10-CM

## 2022-05-02 DIAGNOSIS — R10.12 ABDOMINAL DISCOMFORT IN LEFT UPPER QUADRANT: ICD-10-CM

## 2022-05-02 DIAGNOSIS — E66.9 OBESITY (BMI 30-39.9): ICD-10-CM

## 2022-05-02 PROBLEM — Z23 NEED FOR DTAP VACCINE: Status: RESOLVED | Noted: 2021-09-28 | Resolved: 2022-05-02

## 2022-05-02 PROBLEM — U07.1 COVID-19 VIRUS DETECTED: Status: RESOLVED | Noted: 2022-01-03 | Resolved: 2022-05-02

## 2022-05-02 PROCEDURE — 99214 OFFICE O/P EST MOD 30 MIN: CPT | Performed by: FAMILY MEDICINE

## 2022-05-02 ASSESSMENT — FIBROSIS 4 INDEX: FIB4 SCORE: 0.64

## 2022-05-02 NOTE — ASSESSMENT & PLAN NOTE
Is aThis is a chronic problem.  Patient is here to discuss his last lab test.  His hemoglobin A1c was higher at 7.5%.  To gaining some weight over the last several months and having a lot of stress in his current job position.  He also has not been working out as much as he had in the past.  He wants to get back to the and see if losing a little weight helps.  He has gained about 10 to 15 pounds over the last year.  He also has not been able to eat a very regular pattern while at work as he gets too busy and does not take time to do so.  He will try to change that as well.

## 2022-05-02 NOTE — ASSESSMENT & PLAN NOTE
This is a chronic problem.  Patient states he still having some feelings of fatigue but during the day sometimes he will get burst of energy.  He is not sure if it is much better since his thyroid is been corrected.  And it may be worse since he is gaining weight.  We did check his testosterone level last fall and it was in the low range of normal but not abnormally low.

## 2022-05-02 NOTE — ASSESSMENT & PLAN NOTE
This is a chronic problem.  Patient dates he gets periodic episodes of discomfort and bloating with pain in his left upper quadrant.  Sometimes he can even feel lumps under there that resolved after a while.  He has undergone an EGD with ultrasound that did not show any reason for these symptoms.  He does note that is worse if he has cauliflower or apples.  He has tried using probiotics and that does not help.  Sometimes he gets a little constipated and will take Metamucil.

## 2022-05-02 NOTE — ASSESSMENT & PLAN NOTE
This is a chronic problem.  We did discuss about weight loss and how that affects the body.  He is going to work on that the next 4 months.

## 2022-05-02 NOTE — PROGRESS NOTES
Subjective:     CC: Here to discuss several issues.    HPI:   Giuseppe presents today with the following medical concerns:    Diabetes mellitus (HCC)  Is aThis is a chronic problem.  Patient is here to discuss his last lab test.  His hemoglobin A1c was higher at 7.5%.  To gaining some weight over the last several months and having a lot of stress in his current job position.  He also has not been working out as much as he had in the past.  He wants to get back to the and see if losing a little weight helps.  He has gained about 10 to 15 pounds over the last year.  He also has not been able to eat a very regular pattern while at work as he gets too busy and does not take time to do so.  He will try to change that as well.    Chronic fatigue  This is a chronic problem.  Patient states he still having some feelings of fatigue but during the day sometimes he will get burst of energy.  He is not sure if it is much better since his thyroid is been corrected.  And it may be worse since he is gaining weight.  We did check his testosterone level last fall and it was in the low range of normal but not abnormally low.    Obesity (BMI 30-39.9)  This is a chronic problem.  We did discuss about weight loss and how that affects the body.  He is going to work on that the next 4 months.    Abdominal discomfort in left upper quadrant  This is a chronic problem.  Patient dates he gets periodic episodes of discomfort and bloating with pain in his left upper quadrant.  Sometimes he can even feel lumps under there that resolved after a while.  He has undergone an EGD with ultrasound that did not show any reason for these symptoms.  He does note that is worse if he has cauliflower or apples.  He has tried using probiotics and that does not help.  Sometimes he gets a little constipated and will take Metamucil.      Past Medical History:   Diagnosis Date   • Bowel habit changes 02/2021    constipation and diarrhea   • Glaucoma     L eye   •  "Heart burn    • High cholesterol    • Hypertension    • Myocardial infarct (HCC)    • Snoring    • Tuberculosis 2008    no treatment, \"not active\"       Social History     Tobacco Use   • Smoking status: Never Smoker   • Smokeless tobacco: Never Used   Vaping Use   • Vaping Use: Never used   Substance Use Topics   • Alcohol use: Not Currently     Comment: occasional, rare   • Drug use: No       Current Outpatient Medications Ordered in Epic   Medication Sig Dispense Refill   • atorvastatin (LIPITOR) 80 MG tablet Take 1 Tablet by mouth every evening. 90 Tablet 7   • losartan (COZAAR) 50 MG Tab Take 1 Tablet by mouth every day. 90 Tablet 7   • metoprolol SR (TOPROL XL) 25 MG TABLET SR 24 HR Take 0.5 Tablets by mouth every day. 45 Tablet 7   • levothyroxine (SYNTHROID) 75 MCG Tab Take 1 Tablet by mouth every morning on an empty stomach. 90 Tablet 3   • lansoprazole (PREVACID) 30 MG CAPSULE DELAYED RELEASE Take 1 capsule by mouth every day. 90 capsule 3   • ADULT ASPIRIN LOW STRENGTH PO Take 81 mg by mouth.     • fexofenadine (ALLEGRA) 180 MG tablet Take 180 mg by mouth every day.       No current [facility]-ordered facility-administered medications on file.       Allergies:  Biofreeze, Iodine, Latex, Cat hair extract, Lisinopril, Pineapple, and Shellfish allergy    Health Maintenance: Completed    ROS:  Gen: no fevers/chills,   Eyes: no changes in vision  ENT: no sore throat, no hearing loss, no bloody nose  Pulm: no sob, no cough  CV: no chest pain, no palpitations  GI: no nausea/vomiting, no diarrhea  : no dysuria  MSk: no myalgias  Skin: no rash  Neuro: no headaches, no numbness/tingling  Heme/Lymph: no easy bruising      Objective:       Exam:  /80 (BP Location: Left arm, Patient Position: Sitting, BP Cuff Size: Large adult)   Pulse 76   Temp 36.7 °C (98.1 °F) (Temporal)   Resp 20   Ht 1.753 m (5' 9\")   Wt 114 kg (250 lb 4.8 oz)   SpO2 97%   BMI 36.96 kg/m²  Body mass index is 36.96 kg/m².    Gen: Alert " and oriented, No apparent distress.  Psych: Patient is alert and orient x3.  No unusual topics expressed.  Insight and judgment is good.  Does not appear anxious or depressed.      Labs: Reviewed with patient    Assessment & Plan:     46 y.o. male with the following -     1. Type 2 diabetes mellitus without complication, without long-term current use of insulin (HCC)  This is a chronic problem.  I told patient we will recheck his labs again in 4 months to see if he is made improvement.  If not we will need to start him on metformin at that time.  He does get annual eye exams it is encouraged to have the records sent to us at his next visit.  - Comp Metabolic Panel; Future  - MICROALBUMIN CREAT RATIO URINE; Future  - HEMOGLOBIN A1C; Future  - Lipid Profile; Future    2. Chronic fatigue  This is a chronic problem.  We discussed this and we will check his testosterone again at his next level.  It also may be due to his diabetes or his chronic stress issues.  - TESTOSTERONE SERUM; Future    3. Obesity (BMI 30-39.9)  This is a chronic problem.  We discussed weight reduction.  - Patient identified as having weight management issue.  Appropriate orders and counseling given.      4. Abdominal discomfort in left upper quadrant  This is a chronic problem.  After discussion with the patient we will have him try a low FODMAP diet and see if that makes a difference.  If it does not help we could do food allergy testing.      Return if symptoms worsen or fail to improve.  39 minutes spent with the patient.  Please note that this dictation was created using voice recognition software. I have made every reasonable attempt to correct obvious errors, but I expect that there are errors of grammar and possibly content that I did not discover before finalizing the note.

## 2022-05-09 ENCOUNTER — APPOINTMENT (OUTPATIENT)
Dept: PHYSICAL THERAPY | Facility: REHABILITATION | Age: 47
End: 2022-05-09
Attending: STUDENT IN AN ORGANIZED HEALTH CARE EDUCATION/TRAINING PROGRAM
Payer: COMMERCIAL

## 2022-05-16 ENCOUNTER — APPOINTMENT (OUTPATIENT)
Dept: PHYSICAL MEDICINE AND REHAB | Facility: MEDICAL CENTER | Age: 47
End: 2022-05-16
Payer: COMMERCIAL

## 2022-05-31 ENCOUNTER — NON-PROVIDER VISIT (OUTPATIENT)
Dept: NEUROLOGY | Facility: MEDICAL CENTER | Age: 47
End: 2022-05-31
Attending: SPECIALIST
Payer: COMMERCIAL

## 2022-05-31 DIAGNOSIS — R20.0 NUMBNESS AND TINGLING IN LEFT ARM: ICD-10-CM

## 2022-05-31 DIAGNOSIS — R20.2 NUMBNESS AND TINGLING IN LEFT ARM: ICD-10-CM

## 2022-05-31 PROCEDURE — 95886 MUSC TEST DONE W/N TEST COMP: CPT | Performed by: SPECIALIST

## 2022-05-31 PROCEDURE — 95908 NRV CNDJ TST 3-4 STUDIES: CPT | Mod: 26 | Performed by: SPECIALIST

## 2022-05-31 PROCEDURE — 95886 MUSC TEST DONE W/N TEST COMP: CPT | Mod: 26 | Performed by: SPECIALIST

## 2022-05-31 PROCEDURE — 95908 NRV CNDJ TST 3-4 STUDIES: CPT | Performed by: SPECIALIST

## 2022-05-31 NOTE — PROCEDURES
"NERVE CONDUCTION STUDIES AND ELECTROMYOGRAPHY REPORT        05/31/22      Referring provider: Manjeet Moyer III, M.D.      SUMMARY OF PATIENT'S CLINICAL HISTORY,PHYSICAL EXAM, AND RATIONALE FOR TESTING:    Mr. Giuseppe Swift 46 y.o. presenting with left shoulder and arm pain and left hand numbness aggravated by weightlifting.    Past Medical History is significant for :   Past Medical History:   Diagnosis Date   • Bowel habit changes 02/2021    constipation and diarrhea   • Glaucoma     L eye   • Heart burn    • High cholesterol    • Hypertension    • Myocardial infarct (HCC)    • Snoring    • Tuberculosis 2008    no treatment, \"not active\"       A brief general examination was remarkable subjective ulnar hand numbness, tenderness over the suprascapular notch.    The electrodiagnostic studies were performed to evaluate for possible radiculopathy versus neuropathy.      ELECTRODIAGNOSTIC EXAMINATION:  Nerve conduction studies (NCS) and electromyography (EMG) are utilized to evaluate direct or indirect damage to the peripheral nervous system. NCS are performed to measure the nerve(s) response(s) to electrostimulation across a given nerve segment. EMG evaluates the passive and active electrical activity of the muscle(s) in question.  Muscles are innervated by specific peripheral nerves and roots. Often times, several nerves the muscle to be examined in order to determine the presence or absence of the disease process. Furthermore, nerves and muscles may need to be tested in a [id number] comparison, as well as in additional extremities, as this may be crucial in characterizing the extent of the disease process, which may be diffuse or isolated and of varying degree of severity. The extent of the neurodiagnostic exam is justified as it may help arrive to a proper diagnosis, which ultimately may contribute to better management of the patient. Therefore, the nerves to muscles examined during the study were " medically necessary.    Unless otherwise noted, temperature of the extremity(s) study was monitored before and during the examination and remained between 32 and 36 degrees C for the upper extremities, and between 30 and 36 degrees C for the lower extremities.      NERVE CONDUCTION STUDIES:  Sensory nerves:   -Left median sensory nerve was examined.The response was within normal limits.  -Left ulnar sensory nerve was examined. The response was within normal limits.    Motor nerves:   -Left median motor nerve was examined. Recording electrodes placed at the Abductor Pollicis Brevis muscles. The response was within normal limits.  -Left ulnar motor nerve was examined. Recording electrodes placed at the Abductor Digiti Minimi muscles. The response was abnormal.  Onset latency and amplitude were within normal limits.  Conduction velocity slowed from 56 to 45 m/s across the elbow.      ELECTROMYOGRAPHY:  The study was performed the concentric needle electrode. Fibrillation and fasciculation activity is graded by convention from none (0) to continuous (4+).  Needle electrode examination was performed in the following muscles: Left deltoid, biceps, triceps, first dorsal interosseous, supraspinatus.  The muscles tested demonstrated normal insertional activity, normal motor unit morphology and recruitment. There were no elements suggestive of active denervation.    Nerve Conduction Studies     Stim Site NR Onset (ms) Norm Onset (ms) O-P Amp (µV) Norm O-P Amp Site1 Site2 Delta-P (ms) Dist (cm) Jossue (m/s) Norm Jossue (m/s)   Left Median Anti Sensory (2nd Digit)   Wrist    2.7 <3.4 29.8 >20 Wrist 2nd Digit 3.1 14.0 *45 >50   Left Ulnar Anti Sensory (5th Digit)   Wrist    2.5 <3.1 34.1 >12 Wrist 5th Digit 3.1 14.0 *45 >50        Stim Site NR Onset (ms) Norm Onset (ms) O-P Amp (mV) Norm O-P Amp Site1 Site2 Delta-0 (ms) Dist (cm) Jossue (m/s) Norm Jossue (m/s)   Left Median Motor (Abd Poll Brev)   Wrist    3.0 <3.9 12.2 >6 Elbow Wrist 4.8  26.0 54 >50   Elbow    7.8  11.3          Left Ulnar Motor (Abd Dig Min)   Wrist    3.0 <3.1 11.6 >7 B Elbow Wrist 3.6 20.0 56 >50   B Elbow    6.6  9.3  A Elbow B Elbow 2.2 10.0 45    A Elbow    8.8  8.8                        Electromyography     Side Muscle Nerve Root Ins Act Fibs Psw Amp Dur Poly Recrt Int Pat Comment   Left Deltoid Axillary C5-6 Nml Nml Nml Nml Nml 0 Nml Nml    Left Biceps Musculocut C5-6 Nml Nml Nml Nml Nml 0 Nml Nml    Left Triceps Radial C6-7-8 Nml Nml Nml Nml Nml 0 Nml Nml    Left 1stDorInt Ulnar C8-T1 Nml Nml Nml Nml Nml 0 Nml Nml    Left Supraspinatus SupraScap C5-6 Nml Nml Nml Nml Nml 0 Nml Nml            DIAGNOSTIC INTERPRETATION:   Extensive electrodiagnostic studies were performed to the left upper extremity.  The results are as follows:    1.  Left ulnar nerve slowing across the elbow without motor unit changes in ulnar nerve supplied hand muscles.    2.  Normal left median motor and sensory conduction studies.    3.  No acute or chronic denervation changes were noted in selected muscles studied in the left upper extremity, no evidence of radiculopathy in the left upper extremity.    CLINICAL DISCUSSION:   Although the patient did have tenderness to palpation over the suprascapular notch there was no evidence of denervation in the supraspinatus muscle on electromyographic examination.  Clinical correlation is suggested.      MIGUEL Sheikh M.D.

## 2022-06-06 ENCOUNTER — OFFICE VISIT (OUTPATIENT)
Dept: PHYSICAL MEDICINE AND REHAB | Facility: MEDICAL CENTER | Age: 47
End: 2022-06-06
Payer: COMMERCIAL

## 2022-06-06 VITALS
OXYGEN SATURATION: 95 % | TEMPERATURE: 97.6 F | DIASTOLIC BLOOD PRESSURE: 90 MMHG | HEART RATE: 65 BPM | SYSTOLIC BLOOD PRESSURE: 142 MMHG | WEIGHT: 247.8 LBS | BODY MASS INDEX: 36.7 KG/M2 | HEIGHT: 69 IN

## 2022-06-06 DIAGNOSIS — M77.12 LATERAL EPICONDYLITIS OF LEFT ELBOW: ICD-10-CM

## 2022-06-06 DIAGNOSIS — G56.82 SUPRASCAPULAR NERVE ENTRAPMENT, LEFT: ICD-10-CM

## 2022-06-06 DIAGNOSIS — G56.22 ULNAR NEUROPATHY AT ELBOW, LEFT: ICD-10-CM

## 2022-06-06 PROCEDURE — 99214 OFFICE O/P EST MOD 30 MIN: CPT | Performed by: STUDENT IN AN ORGANIZED HEALTH CARE EDUCATION/TRAINING PROGRAM

## 2022-06-06 ASSESSMENT — FIBROSIS 4 INDEX: FIB4 SCORE: 0.64

## 2022-06-06 ASSESSMENT — PAIN SCALES - GENERAL: PAINLEVEL: 8=MODERATE-SEVERE PAIN

## 2022-06-06 ASSESSMENT — PATIENT HEALTH QUESTIONNAIRE - PHQ9: CLINICAL INTERPRETATION OF PHQ2 SCORE: 0

## 2022-06-06 NOTE — PROGRESS NOTES
Being follow-up patient Note    Interventional Pain and Spine  Physiatry (Physical Medicine and Rehabilitation)     Patient Name: Giuseppe Swift  : 1975  Date of Service: 22      Chief Complaint:   Chief Complaint   Patient presents with   • Follow-Up     Myalgia       HISTORY (21)  Giuseppe Swift is a 46 y.o. RHD male who presents today with pain at his left neck, left upper arm, and left forearm. He was originally diagnosed with lateral epicondylitis by Dr. Mckenna on 11/15/21.  Over time his pain worsened worsened to also encompass left upper arm and left posterior shoulder. He has dealt with this left elbow pain for multiple years and notes that usually it occurs after weightlifting. His current left elbow pain started after doing skullcrushers approximately 6 months ago. He typically takes a break for many months and then gets back to weightlifting.      He states his pain at his left neck, left upper arm, and left forearm feels like tightness, throbbing, and burning. He also reports numbness, tingling, and pins and needles in his left arm and hand, all fingers. This numbness and tingling can be provoked by palpating different parts of his left upper trapezius. During the course of the day, his pain at its best-worse level is 9-10/10, respectively. Pain right now is 9/10 on the numeric pain scale. Pain worsens with sitting, standing, walking, bending forward, bending backwards, side bending or twisting, walking upstairs and walking downstairs and improves with nothing. His pain interferes somewhat with ADLs. The patient endorses pain limited diffuse weakness of his left upper extremity and has had to stop lifting weights. He otherwise denies new weakness, numbness, or bladder/bowel incontinence      Of note he reports history of bilateral upper extremity nerve injury that was seen on EMG back performed when [facility] was known as [facility].  He states he sustained this nerve  injury from incorrect positioning during L5-S1 fusion surgery.  I am unable to find this EMG in chart review today.     MA at [place]     The patient has not done physical therapy for this problem    HPI  Today's visit:  Giuseppe Swift is a 46 y.o. male with Diagnoses of Lateral epicondylitis of left elbow, Ulnar neuropathy at elbow, left, and Suprascapular nerve entrapment, left were pertinent to this visit.    Notes worsened pain after EMG. States that physician pressing his upper back around the suprascapular notch worsened the pain radiating down his left hand. Endorses sharp stabbing and burning pain that burns with numbness radiating from his left shoulder down to his left fourth and fifth fingers.  Also has left elbow pain and has worn a counterforce brace for lateral epicondylitis. Using voltaren gel with slight relief.    Rates left suprascapular pain as 7.5/10. Rates pain at left elbow at 8/10.    Did PT with worsening pain. Notes relief with e-stim lasting 45-60 minutes after the the e-stim was turned off.  No longer going to PT    Going to chiropractor with some relief but states he has new burning pain down his left leg after a chiro manipulation. Has hx of L5-S1 fusion. States an XR taken at work shows his bony structure has not changed in appearance.  He is okay with monitoring this symptom for now.    The patient denies new weakness, numbness, or bladder/bowel incontinence    Procedure history:  - 12/20/21 TPI -improvement in pain until he contracted COVID-19.  Had subsequent itchy red rash lasting for 3 days.  He has received lidocaine in the past so he could be sensitive to chlorhexidine or bupivacaine.      ROS:   Red Flags ROS:   Fever, Chills, Sweats: Denies  Involuntary Weight Loss: Denies  Bladder Incontinence: Denies  Bowel Incontinence: denies  Saddle Anesthesia: Denies    All other systems reviewed and negative.     PMHx:   Past Medical History:   Diagnosis Date   •  "Bowel habit changes 02/2021    constipation and diarrhea   • Glaucoma     L eye   • Heart burn    • High cholesterol    • Hypertension    • Myocardial infarct (HCC)    • Snoring    • Tuberculosis 2008    no treatment, \"not active\"       PSHx:   Past Surgical History:   Procedure Laterality Date   • CA UPPER GI ENDOSCOPY,DIAGNOSIS  2/22/2021    Procedure: GASTROSCOPY;  Surgeon: Jcarlos Thomas M.D.;  Location: SURGERY [facility];  Service: EUS   • EGD W/ENDOSCOPIC ULTRASOUND  2/22/2021    Procedure: EGD, WITH ENDOSCOPIC US - UPPER RADIAL LINEAR;  Surgeon: Jcarlos Thomas M.D.;  Location: SURGERY [facility] ER;  Service: EUS   • VASECTOMY  2007   • FUSION, SPINE, LUMBAR, PLIF  2004    L4-S1       Family Hx:   Family History   Problem Relation Age of Onset   • Diabetes Mother    • No Known Problems Father        Social Hx:  Social History     Socioeconomic History   • Marital status: Single     Spouse name: Not on file   • Number of children: Not on file   • Years of education: Not on file   • Highest education level: Associate degree: occupational, technical, or vocational program   Occupational History   • Not on file   Tobacco Use   • Smoking status: Never Smoker   • Smokeless tobacco: Never Used   Vaping Use   • Vaping Use: Never used   Substance and Sexual Activity   • Alcohol use: Not Currently     Comment: occasional, rare   • Drug use: No   • Sexual activity: Yes     Partners: Female     Birth control/protection: Surgical   Other Topics Concern   • Not on file   Social History Narrative   • Not on file     Social Determinants of Health     Financial Resource Strain: Not on file   Food Insecurity: Not on file   Transportation Needs: Not on file   Physical Activity: Not on file   Stress: Not on file   Social Connections: Not on file   Intimate Partner Violence: Not on file   Housing Stability: Not on file       Allergies:  Allergies   Allergen Reactions   • Biofreeze Rash     Pt states fully body rash   • Iodine Rash " and Itching     Pt. States he broke out from IV contrast    • Latex Swelling     Pt. States he also gets blisters   • Cat Hair Extract Anxiety, Hives, Itching, Rash, Runny Nose, Shortness of Breath and Swelling   • Lisinopril      Cough   • Pineapple Unspecified     Tongue swelling and itchy throat, rash per pt   • Shellfish Allergy Anaphylaxis       Medications: reviewed on epic.   Outpatient Medications Marked as Taking for the 6/6/22 encounter (Office Visit) with Neela Dillard M.D.   Medication Sig Dispense Refill   • atorvastatin (LIPITOR) 80 MG tablet Take 1 Tablet by mouth every evening. 90 Tablet 7   • losartan (COZAAR) 50 MG Tab Take 1 Tablet by mouth every day. 90 Tablet 7   • metoprolol SR (TOPROL XL) 25 MG TABLET SR 24 HR Take 0.5 Tablets by mouth every day. 45 Tablet 7   • levothyroxine (SYNTHROID) 75 MCG Tab Take 1 Tablet by mouth every morning on an empty stomach. 90 Tablet 3   • lansoprazole (PREVACID) 30 MG CAPSULE DELAYED RELEASE Take 1 capsule by mouth every day. 90 capsule 3   • ADULT ASPIRIN LOW STRENGTH PO Take 81 mg by mouth.     • fexofenadine (ALLEGRA) 180 MG tablet Take 180 mg by mouth every day.          Current Outpatient Medications on File Prior to Visit   Medication Sig Dispense Refill   • atorvastatin (LIPITOR) 80 MG tablet Take 1 Tablet by mouth every evening. 90 Tablet 7   • losartan (COZAAR) 50 MG Tab Take 1 Tablet by mouth every day. 90 Tablet 7   • metoprolol SR (TOPROL XL) 25 MG TABLET SR 24 HR Take 0.5 Tablets by mouth every day. 45 Tablet 7   • levothyroxine (SYNTHROID) 75 MCG Tab Take 1 Tablet by mouth every morning on an empty stomach. 90 Tablet 3   • lansoprazole (PREVACID) 30 MG CAPSULE DELAYED RELEASE Take 1 capsule by mouth every day. 90 capsule 3   • ADULT ASPIRIN LOW STRENGTH PO Take 81 mg by mouth.     • fexofenadine (ALLEGRA) 180 MG tablet Take 180 mg by mouth every day.       No current facility-administered medications on file prior to visit.         EXAMINATION  "    Physical Exam:   BP (!) 142/90 (BP Location: Right arm, Patient Position: Sitting, BP Cuff Size: Adult)   Pulse 65   Temp 36.4 °C (97.6 °F) (Temporal)   Ht 1.753 m (5' 9\")   Wt 112 kg (247 lb 12.8 oz)   SpO2 95%     Constitutional:   Body Habitus: Body mass index is 36.59 kg/m².  Cooperation: Fully cooperates with exam  Appearance: Well-groomed, well-nourished.    Eyes: No scleral icterus to suggest severe liver disease, no proptosis to suggest severe hyperthyroidism    ENT -no obvious auditory deficits, no noticeable facial droop     Skin -no rashes or lesions noted     Respiratory-  breathing comfortably on room air, no audible wheezing    Cardiovascular-distal extremities warm and well perfused.  No lower extremity edema is noted.     Gastrointestinal - no obvious abdominal masses, non-distended    Psychiatric- alert and oriented ×3. Normal affect.     Gait - normal gait, no use of ambulatory device, nonantalgic.     Musculoskeletal and Neuro -     Palpation of left biceps, left triceps, left upper trapezius reproduces radiating pain down left arm with associated numbness and tingling.    Cervical spine   Inspection: No deformities of the skin over the cervical spine. No rashes or lesions.     No signs of muscular atrophy in bilateral upper extremities       Tenderness to palpation at midline of cervical spine, bilateral upper trapezius, and paracervical muscles bilaterally and left suprascapular notch.     Key points for the international standards for neurological classification of spinal cord injury (ISNCSCI) to light touch.       Motor Exam Upper Extremities   ? Myotome R L   Shoulder abduction C5 5 4*   Elbow flexion C5 5 4*   Wrist extension C6 5 4*   Elbow extension C7 5 4-*   Finger flexion C8 5 4*   Finger abduction T1 5 5   *pain limited         Dermatome R L   C4 2 2   C5 2 2   C6 2 2   C7 2 2   C8 2 2   T1 2 2   T2 2 2      Previous exam  full active range of motion in all " directions     Spurling's sign  negative bilaterally    Left shoulder external rotation 4-/5  Left shoulder internal rotation 4+/5    Reflexes  ?   R L   Biceps   2+ 2+   Brachioradialis   2+ 2+      Butts's sign negative bilaterally       MEDICAL DECISION MAKING    Medical records review: see under HPI section.     DATA    Labs: No new labs available for review since last visit.    Lab Results   Component Value Date/Time    SODIUM 138 02/16/2021 02:14 PM    POTASSIUM 4.0 02/16/2021 02:14 PM    CHLORIDE 102 02/16/2021 02:14 PM    CO2 24 02/16/2021 02:14 PM    ANION 12.0 02/16/2021 02:14 PM    GLUCOSE 88 02/16/2021 02:14 PM    BUN 15 02/16/2021 02:14 PM    CREATININE 0.85 02/16/2021 02:14 PM    CREATININE 1.0 01/26/2006 01:23 PM    CALCIUM 9.3 02/16/2021 02:14 PM    ASTSGOT 21 11/12/2020 08:09 AM    ALTSGPT 44 11/12/2020 08:09 AM    TBILIRUBIN 0.4 11/12/2020 08:09 AM    ALBUMIN 4.3 11/12/2020 08:09 AM    TOTPROTEIN 6.9 11/12/2020 08:09 AM    GLOBULIN 2.6 11/12/2020 08:09 AM    AGRATIO 1.7 11/12/2020 08:09 AM       Lab Results   Component Value Date/Time    PROTHROMBTM 13.0 11/12/2020 08:09 AM    INR 0.95 11/12/2020 08:09 AM        Lab Results   Component Value Date/Time    WBC 9.6 09/28/2021 12:35 PM    RBC 5.35 09/28/2021 12:35 PM    HEMOGLOBIN 15.9 09/28/2021 12:35 PM    HEMATOCRIT 47.5 09/28/2021 12:35 PM    MCV 88.8 09/28/2021 12:35 PM    MCH 29.7 09/28/2021 12:35 PM    MCHC 33.5 (L) 09/28/2021 12:35 PM    MPV 10.7 09/28/2021 12:35 PM    NEUTSPOLYS 61.80 09/28/2021 12:35 PM    LYMPHOCYTES 30.10 09/28/2021 12:35 PM    MONOCYTES 6.20 09/28/2021 12:35 PM    EOSINOPHILS 0.90 09/28/2021 12:35 PM    BASOPHILS 0.60 09/28/2021 12:35 PM        Lab Results   Component Value Date/Time    HBA1C 7.5 (H) 04/13/2022 12:50 PM        EMG 5/31/22 by Dr. Sheikh  DIAGNOSTIC INTERPRETATION:   Extensive electrodiagnostic studies were performed to the left upper extremity.  The results are as follows:     1.  Left ulnar nerve slowing  across the elbow without motor unit changes in ulnar nerve supplied hand muscles.     2.  Normal left median motor and sensory conduction studies.     3.  No acute or chronic denervation changes were noted in selected muscles studied in the left upper extremity, no evidence of radiculopathy in the left upper extremity.     CLINICAL DISCUSSION:   Although the patient did have tenderness to palpation over the suprascapular notch there was no evidence of denervation in the supraspinatus muscle on electromyographic examination.  Clinical correlation is suggested.    Imaging:   I personally reviewed following images, these are my reads  MRI cervical spine 11/16/21  No significant central canal stenosis or neuroforaminal stenosis at any level        IMAGING radiology reads. I reviewed the following radiology reads   Results for orders placed during the hospital encounter of 11/16/21     MR-CERVICAL SPINE-W/O     Impression  Mild endplate degenerative changes as described above. There is no significant spinal canal stenosis or foraminal narrowing.                             Results for orders placed in visit on 11/15/21     DX-CERVICAL SPINE-2 OR 3 VIEWS     Impression  Negative cervical spine series.         Diagnosis  Visit Diagnoses     ICD-10-CM   1. Lateral epicondylitis of left elbow  M77.12   2. Ulnar neuropathy at elbow, left  G56.22   3. Suprascapular nerve entrapment, left  G56.82         ASSESSMENT AND PLAN:  Giuseppe Swift is a 46 y.o. male with history of CAD, prehypertension, obesity, GERD, diabetes, hypothyroidism who presents with many months of pain in his left upper shoulder and left upper arm. He also has numbness and tingling from his left upper trapezius down to his fingers which appears to be in a nondermatomal distribution and not in the distribution of a peripheral nerve, elicited with generalized palpation of various muscles including his left upper trapezius, left biceps, left triceps,  suggestive of myofascial pain with a component of referred pain.    He also has tenderness to palpation at his left suprascapular notch corresponding to underlying suprascapular nerve with pain radiating from this location to his scapula and weakness with left shoulder external and internal rotation, suggestive of left suprascapular neuropathy.  Limited US evaluation while doing trigger point injections on 12/20/21 did not reveal an cyst or space occupying lesion nearby.    EMG 5/31/2022 showed left ulnar nerve slowing across the elbow without motor unit changes in ulnar nerve supplied hand muscles.     MRI cervical spine 11/16/21 negative for evidence of nerve impingement that could explain the patient's symptoms.     Giuseppe was seen today for follow-up.    Diagnoses and all orders for this visit:    Lateral epicondylitis of left elbow  -     Referral to Hand Therapy    Ulnar neuropathy at elbow, left  -     Referral to Hand Therapy    Suprascapular nerve entrapment, left  -     Referral to Pain Clinic  -     Referral to Hand Therapy          The above note documents my personal evaluation of this patient. In addition, I have reviewed and confirmed with the patient and MA the supportive information documented in today's Patient Health Questionnaire and Office Note.       PLAN  Physical Therapy: I ordered hand therapy for left cubital tunnel syndrome  -Recently completed physical therapy with worsening pain     Diagnostic tests:  - discussed EMG 5/31/22 showing left ulnar nerve slowing across the elbow without motor unit changes in ulnar nerve supplied hand muscles.  Discussed that this is mild and possibly consistent with cubital tunnel syndrome.    Medications:  -Okay to continue current medications.  Patient would like to avoid adding muscle relaxants      Interventions:   - left suprascapular nerve block with steroid under ultrasound guidance.   -  trigger point injections with ultrasound guidance PRN.  In the  future would avoid bupivacaine and possibly chlorhexidine given possible allergy to this.  Patient endorsed 3 days of a pruritic rash after the previous trigger point injections which could be attributable to bupivacaine or chlorhexidine since he does not recall receiving this in the past but has received lidocaine.   -Could consider left ulnar nerve injection at the cubital tunnel if his numbness and tingling persist despite hand therapy and suprascapular nerve block as above     Of note he reports history of bilateral upper extremity nerve injury that was seen on EMG back performed when [facility] was known as [facility].  He states he sustained this nerve injury from incorrect positioning during L5-S1 fusion surgery.  I am unable to find this EMG in chart review.    Other:  - Discussed wearing a left elbow pad as needed to offload a possible area of ulnar nerve compression at his left elbow    Follow-up: Next week for left suprascapular nerve block with steroid    Orders Placed This Encounter   • Referral to Pain Clinic   • Referral to Hand Therapy       Neela Dillard MD  Interventional Pain Management  Physical Medicine and Rehabilitation  [facility]    Total time: 32 minutes. I spent greater than 50% of the time for patient care and coordination on this date, including face-to-face time with the patient as per assessment and plan above.     Please note that this dictation was created using voice recognition software. I have made every reasonable attempt to correct obvious errors, but I expect that there are errors of grammar and possibly content that I did not discover before finalizing the note.

## 2022-06-13 ENCOUNTER — OFFICE VISIT (OUTPATIENT)
Dept: PHYSICAL MEDICINE AND REHAB | Facility: MEDICAL CENTER | Age: 47
End: 2022-06-13
Payer: COMMERCIAL

## 2022-06-13 VITALS
DIASTOLIC BLOOD PRESSURE: 72 MMHG | HEART RATE: 64 BPM | OXYGEN SATURATION: 97 % | WEIGHT: 242 LBS | HEIGHT: 69 IN | BODY MASS INDEX: 35.84 KG/M2 | TEMPERATURE: 97.3 F | SYSTOLIC BLOOD PRESSURE: 118 MMHG

## 2022-06-13 DIAGNOSIS — G56.82 SUPRASCAPULAR NERVE ENTRAPMENT, LEFT: ICD-10-CM

## 2022-06-13 DIAGNOSIS — G56.82 SUPRASCAPULAR NEUROPATHY, LEFT: ICD-10-CM

## 2022-06-13 PROCEDURE — 76942 ECHO GUIDE FOR BIOPSY: CPT | Performed by: STUDENT IN AN ORGANIZED HEALTH CARE EDUCATION/TRAINING PROGRAM

## 2022-06-13 PROCEDURE — 64418 NJX AA&/STRD SPRSCAP NRV: CPT | Mod: LT | Performed by: STUDENT IN AN ORGANIZED HEALTH CARE EDUCATION/TRAINING PROGRAM

## 2022-06-13 RX ORDER — DEXAMETHASONE SODIUM PHOSPHATE 4 MG/ML
4 INJECTION, SOLUTION INTRA-ARTICULAR; INTRALESIONAL; INTRAMUSCULAR; INTRAVENOUS; SOFT TISSUE ONCE
Status: COMPLETED | OUTPATIENT
Start: 2022-06-13 | End: 2022-06-13

## 2022-06-13 RX ADMIN — DEXAMETHASONE SODIUM PHOSPHATE 4 MG: 4 INJECTION, SOLUTION INTRA-ARTICULAR; INTRALESIONAL; INTRAMUSCULAR; INTRAVENOUS; SOFT TISSUE at 10:02

## 2022-06-13 ASSESSMENT — PAIN SCALES - GENERAL: PAINLEVEL: 8=MODERATE-SEVERE PAIN

## 2022-06-13 ASSESSMENT — FIBROSIS 4 INDEX: FIB4 SCORE: 0.64

## 2022-06-13 ASSESSMENT — PATIENT HEALTH QUESTIONNAIRE - PHQ9: CLINICAL INTERPRETATION OF PHQ2 SCORE: 0

## 2022-06-13 NOTE — PROCEDURES
Date of Service: 6/13/2022    Physician/s: Neela Dillard MD    Pre-operative Diagnosis: left suprascapular neuropathy, left shoulder pain    Post-operative Diagnosis: left suprascapular neuropathy, left shoulder pain    Procedure: left suprascapular nerve block ultrasound-guided     Description of procedure:    The risks, benefits, and alternatives of the procedure were reviewed and discussed with the patient.  Written informed consent was freely obtained. A pre-procedural time-out was conducted by the physician verifying patient’s identity, procedure to be performed, procedure site and side, and allergy verification. Appropriate equipment was determined to be in place for the procedure.  The suprascapular nerve block was for the purpose of aiding in immediate postprocedural stretching.    No sedation was used for this procedure.     In the office suite the patient was placed in seated position with his LEFT hand resting on the contralateral shoulder and the LEFT shoulder exposed. The ultrasound probe was placed over the suprascapular notch and the suprascapular nerve was identified. The skin was prepped and draped in the usual sterile fashion. Using a 27-gauge 1.5 needle, the subcutaneous tissues were anesthetized with approximately 3cc of 1% lidocaine. Subsequently a 22-gauge 3.5 inch needle was placed into the skin and advanced under ultrasound guidance into the suprascapular notch adjacent to the suprascapular artery where the expected path of the suprascapular nerve runs. Following negative aspiration, a solution containing a mixture of 4 mL of 1% lidocaine and 1ml of 4mg/mL dexamethasone was injected. The needle was then removed intact. The patient's shoulder was wiped with a 4x4 gauze, the area was cleansed with alcohol prep, and a bandaid was applied. There were no complications noted.     Neela Dillard MD  Interventional Pain and Spine  Physical Medicine and Rehabilitation  [facility]

## 2022-07-03 NOTE — ANESTHESIA PROCEDURE NOTES
Airway    Date/Time: 2/22/2021 10:11 AM  Performed by: Justin Lincoln M.D.  Authorized by: Justin Lincoln M.D.     Location:  OR  Urgency:  Elective  Indications for Airway Management:  Anesthesia      Spontaneous Ventilation: absent    Sedation Level:  Deep  Preoxygenated: Yes    Patient Position:  Sniffing  Final Airway Type:  Endotracheal airway  Final Endotracheal Airway:  ETT  Cuffed: Yes    Technique Used for Successful ETT Placement:  Direct laryngoscopy    Insertion Site:  Oral  Blade Type:  Karen  Laryngoscope Blade/Videolaryngoscope Blade Size:  4  ETT Size (mm):  7.0  Measured from:  Teeth  ETT to Teeth (cm):  22  Placement Verified by: auscultation and capnometry    Cormack-Lehane Classification:  Grade I - full view of glottis  Number of Attempts at Approach:  1           all other ROS negative except as per HPI

## 2022-07-11 ENCOUNTER — APPOINTMENT (OUTPATIENT)
Dept: PHYSICAL MEDICINE AND REHAB | Facility: MEDICAL CENTER | Age: 47
End: 2022-07-11
Payer: COMMERCIAL

## 2022-07-12 DIAGNOSIS — K21.9 GASTROESOPHAGEAL REFLUX DISEASE: ICD-10-CM

## 2022-07-12 RX ORDER — LANSOPRAZOLE 30 MG/1
30 CAPSULE, DELAYED RELEASE ORAL DAILY
Qty: 90 CAPSULE | Refills: 3 | Status: SHIPPED | OUTPATIENT
Start: 2022-07-12 | End: 2023-06-26

## 2022-09-15 ENCOUNTER — HOSPITAL ENCOUNTER (OUTPATIENT)
Facility: MEDICAL CENTER | Age: 47
End: 2022-09-16
Attending: EMERGENCY MEDICINE | Admitting: HOSPITALIST
Payer: COMMERCIAL

## 2022-09-15 ENCOUNTER — APPOINTMENT (OUTPATIENT)
Dept: RADIOLOGY | Facility: MEDICAL CENTER | Age: 47
End: 2022-09-15
Attending: EMERGENCY MEDICINE
Payer: COMMERCIAL

## 2022-09-15 DIAGNOSIS — R07.9 CHEST PAIN, UNSPECIFIED TYPE: ICD-10-CM

## 2022-09-15 LAB
ALBUMIN SERPL BCP-MCNC: 4.5 G/DL (ref 3.2–4.9)
ALBUMIN/GLOB SERPL: 1.8 G/DL
ALP SERPL-CCNC: 72 U/L (ref 30–99)
ALT SERPL-CCNC: 50 U/L (ref 2–50)
ANION GAP SERPL CALC-SCNC: 11 MMOL/L (ref 7–16)
AST SERPL-CCNC: 41 U/L (ref 12–45)
BASOPHILS # BLD AUTO: 0.7 % (ref 0–1.8)
BASOPHILS # BLD: 0.07 K/UL (ref 0–0.12)
BILIRUB SERPL-MCNC: 0.6 MG/DL (ref 0.1–1.5)
BUN SERPL-MCNC: 9 MG/DL (ref 8–22)
CALCIUM SERPL-MCNC: 9.4 MG/DL (ref 8.4–10.2)
CHLORIDE SERPL-SCNC: 102 MMOL/L (ref 96–112)
CO2 SERPL-SCNC: 26 MMOL/L (ref 20–33)
CREAT SERPL-MCNC: 1.27 MG/DL (ref 0.5–1.4)
EKG IMPRESSION: NORMAL
EOSINOPHIL # BLD AUTO: 0.1 K/UL (ref 0–0.51)
EOSINOPHIL NFR BLD: 1 % (ref 0–6.9)
ERYTHROCYTE [DISTWIDTH] IN BLOOD BY AUTOMATED COUNT: 40.5 FL (ref 35.9–50)
FLUAV RNA SPEC QL NAA+PROBE: NEGATIVE
FLUBV RNA SPEC QL NAA+PROBE: NEGATIVE
GFR SERPLBLD CREATININE-BSD FMLA CKD-EPI: 70 ML/MIN/1.73 M 2
GLOBULIN SER CALC-MCNC: 2.5 G/DL (ref 1.9–3.5)
GLUCOSE SERPL-MCNC: 147 MG/DL (ref 65–99)
HCT VFR BLD AUTO: 54.1 % (ref 42–52)
HGB BLD-MCNC: 17.5 G/DL (ref 14–18)
IMM GRANULOCYTES # BLD AUTO: 0.02 K/UL (ref 0–0.11)
IMM GRANULOCYTES NFR BLD AUTO: 0.2 % (ref 0–0.9)
LYMPHOCYTES # BLD AUTO: 2.71 K/UL (ref 1–4.8)
LYMPHOCYTES NFR BLD: 26.9 % (ref 22–41)
MCH RBC QN AUTO: 28.5 PG (ref 27–33)
MCHC RBC AUTO-ENTMCNC: 32.3 G/DL (ref 33.7–35.3)
MCV RBC AUTO: 88 FL (ref 81.4–97.8)
MONOCYTES # BLD AUTO: 0.83 K/UL (ref 0–0.85)
MONOCYTES NFR BLD AUTO: 8.3 % (ref 0–13.4)
NEUTROPHILS # BLD AUTO: 6.33 K/UL (ref 1.82–7.42)
NEUTROPHILS NFR BLD: 62.9 % (ref 44–72)
NRBC # BLD AUTO: 0 K/UL
NRBC BLD-RTO: 0 /100 WBC
PLATELET # BLD AUTO: 241 K/UL (ref 164–446)
PMV BLD AUTO: 10.3 FL (ref 9–12.9)
POTASSIUM SERPL-SCNC: 4.4 MMOL/L (ref 3.6–5.5)
PROT SERPL-MCNC: 7 G/DL (ref 6–8.2)
RBC # BLD AUTO: 6.15 M/UL (ref 4.7–6.1)
RSV RNA SPEC QL NAA+PROBE: NEGATIVE
SARS-COV-2 RNA RESP QL NAA+PROBE: NOTDETECTED
SODIUM SERPL-SCNC: 139 MMOL/L (ref 135–145)
SPECIMEN SOURCE: NORMAL
TROPONIN T SERPL-MCNC: 10 NG/L (ref 6–19)
TROPONIN T SERPL-MCNC: 14 NG/L (ref 6–19)
WBC # BLD AUTO: 10.1 K/UL (ref 4.8–10.8)

## 2022-09-15 PROCEDURE — 71045 X-RAY EXAM CHEST 1 VIEW: CPT

## 2022-09-15 PROCEDURE — 94760 N-INVAS EAR/PLS OXIMETRY 1: CPT

## 2022-09-15 PROCEDURE — 99220 PR INITIAL OBSERVATION CARE,LEVL III: CPT | Performed by: HOSPITALIST

## 2022-09-15 PROCEDURE — 85025 COMPLETE CBC W/AUTO DIFF WBC: CPT

## 2022-09-15 PROCEDURE — 36415 COLL VENOUS BLD VENIPUNCTURE: CPT

## 2022-09-15 PROCEDURE — 84484 ASSAY OF TROPONIN QUANT: CPT

## 2022-09-15 PROCEDURE — 0241U HCHG SARS-COV-2 COVID-19 NFCT DS RESP RNA 4 TRGT MIC: CPT

## 2022-09-15 PROCEDURE — G0378 HOSPITAL OBSERVATION PER HR: HCPCS

## 2022-09-15 PROCEDURE — 700102 HCHG RX REV CODE 250 W/ 637 OVERRIDE(OP): Performed by: HOSPITALIST

## 2022-09-15 PROCEDURE — 93005 ELECTROCARDIOGRAM TRACING: CPT | Performed by: EMERGENCY MEDICINE

## 2022-09-15 PROCEDURE — 99285 EMERGENCY DEPT VISIT HI MDM: CPT

## 2022-09-15 PROCEDURE — 93005 ELECTROCARDIOGRAM TRACING: CPT

## 2022-09-15 PROCEDURE — 80053 COMPREHEN METABOLIC PANEL: CPT

## 2022-09-15 PROCEDURE — A9270 NON-COVERED ITEM OR SERVICE: HCPCS | Performed by: HOSPITALIST

## 2022-09-15 PROCEDURE — C9803 HOPD COVID-19 SPEC COLLECT: HCPCS | Performed by: EMERGENCY MEDICINE

## 2022-09-15 RX ORDER — ACETAMINOPHEN 500 MG
500 TABLET ORAL EVERY 6 HOURS PRN
Status: DISCONTINUED | OUTPATIENT
Start: 2022-09-15 | End: 2022-09-16 | Stop reason: HOSPADM

## 2022-09-15 RX ORDER — MORPHINE SULFATE 15 MG/1
7.5 TABLET ORAL EVERY 6 HOURS PRN
Status: DISCONTINUED | OUTPATIENT
Start: 2022-09-15 | End: 2022-09-16 | Stop reason: HOSPADM

## 2022-09-15 RX ORDER — POLYETHYLENE GLYCOL 3350 17 G/17G
1 POWDER, FOR SOLUTION ORAL
Status: DISCONTINUED | OUTPATIENT
Start: 2022-09-15 | End: 2022-09-16 | Stop reason: HOSPADM

## 2022-09-15 RX ORDER — LEVOTHYROXINE SODIUM 0.07 MG/1
75 TABLET ORAL
Status: DISCONTINUED | OUTPATIENT
Start: 2022-09-16 | End: 2022-09-16 | Stop reason: HOSPADM

## 2022-09-15 RX ORDER — METOPROLOL SUCCINATE 25 MG/1
12.5 TABLET, EXTENDED RELEASE ORAL DAILY
Status: DISCONTINUED | OUTPATIENT
Start: 2022-09-16 | End: 2022-09-16 | Stop reason: HOSPADM

## 2022-09-15 RX ORDER — ALUMINA, MAGNESIA, AND SIMETHICONE 2400; 2400; 240 MG/30ML; MG/30ML; MG/30ML
30 SUSPENSION ORAL EVERY 4 HOURS PRN
Status: ACTIVE | OUTPATIENT
Start: 2022-09-15 | End: 2022-09-16

## 2022-09-15 RX ORDER — HYDRALAZINE HYDROCHLORIDE 20 MG/ML
10 INJECTION INTRAMUSCULAR; INTRAVENOUS EVERY 6 HOURS PRN
Status: DISCONTINUED | OUTPATIENT
Start: 2022-09-15 | End: 2022-09-16 | Stop reason: HOSPADM

## 2022-09-15 RX ORDER — OMEPRAZOLE 20 MG/1
20 CAPSULE, DELAYED RELEASE ORAL DAILY
Status: DISCONTINUED | OUTPATIENT
Start: 2022-09-16 | End: 2022-09-16 | Stop reason: HOSPADM

## 2022-09-15 RX ORDER — BISACODYL 10 MG
10 SUPPOSITORY, RECTAL RECTAL
Status: DISCONTINUED | OUTPATIENT
Start: 2022-09-15 | End: 2022-09-16 | Stop reason: HOSPADM

## 2022-09-15 RX ORDER — LOSARTAN POTASSIUM 25 MG/1
50 TABLET ORAL DAILY
Status: DISCONTINUED | OUTPATIENT
Start: 2022-09-16 | End: 2022-09-16 | Stop reason: HOSPADM

## 2022-09-15 RX ORDER — ATORVASTATIN CALCIUM 40 MG/1
80 TABLET, FILM COATED ORAL EVERY EVENING
Status: DISCONTINUED | OUTPATIENT
Start: 2022-09-15 | End: 2022-09-16 | Stop reason: HOSPADM

## 2022-09-15 RX ORDER — VITAMIN B COMPLEX
1000 TABLET ORAL DAILY
COMMUNITY

## 2022-09-15 RX ORDER — ACETAMINOPHEN 325 MG/1
650 TABLET ORAL EVERY 6 HOURS PRN
Status: DISCONTINUED | OUTPATIENT
Start: 2022-09-15 | End: 2022-09-15

## 2022-09-15 RX ORDER — FEXOFENADINE HCL 180 MG/1
180 TABLET ORAL DAILY
Status: DISCONTINUED | OUTPATIENT
Start: 2022-09-16 | End: 2022-09-15

## 2022-09-15 RX ADMIN — ATORVASTATIN CALCIUM 80 MG: 40 TABLET, FILM COATED ORAL at 18:05

## 2022-09-15 RX ADMIN — ACETAMINOPHEN 500 MG: 500 TABLET ORAL at 21:31

## 2022-09-15 RX ADMIN — ASPIRIN 162 MG: 81 TABLET, COATED ORAL at 18:05

## 2022-09-15 ASSESSMENT — LIFESTYLE VARIABLES
TOTAL SCORE: 0
ON A TYPICAL DAY WHEN YOU DRINK ALCOHOL HOW MANY DRINKS DO YOU HAVE: 0
CONSUMPTION TOTAL: NEGATIVE
AVERAGE NUMBER OF DAYS PER WEEK YOU HAVE A DRINK CONTAINING ALCOHOL: 1
HAVE YOU EVER FELT YOU SHOULD CUT DOWN ON YOUR DRINKING: NO
EVER HAD A DRINK FIRST THING IN THE MORNING TO STEADY YOUR NERVES TO GET RID OF A HANGOVER: NO
EVER FELT BAD OR GUILTY ABOUT YOUR DRINKING: NO
TOTAL SCORE: 0
TOTAL SCORE: 0
HAVE PEOPLE ANNOYED YOU BY CRITICIZING YOUR DRINKING: NO
DO YOU DRINK ALCOHOL: YES
HOW MANY TIMES IN THE PAST YEAR HAVE YOU HAD 5 OR MORE DRINKS IN A DAY: 0

## 2022-09-15 ASSESSMENT — COGNITIVE AND FUNCTIONAL STATUS - GENERAL
SUGGESTED CMS G CODE MODIFIER MOBILITY: CH
SUGGESTED CMS G CODE MODIFIER DAILY ACTIVITY: CH
MOBILITY SCORE: 24
DAILY ACTIVITIY SCORE: 24

## 2022-09-15 ASSESSMENT — ENCOUNTER SYMPTOMS
NERVOUS/ANXIOUS: 0
CHILLS: 0
STRIDOR: 0
SHORTNESS OF BREATH: 0
FOCAL WEAKNESS: 0
EYE DISCHARGE: 0
COUGH: 0
BRUISES/BLEEDS EASILY: 0
VOMITING: 0
ABDOMINAL PAIN: 0
MYALGIAS: 0
FLANK PAIN: 0
EYE REDNESS: 0
FEVER: 0

## 2022-09-15 ASSESSMENT — PAIN DESCRIPTION - PAIN TYPE
TYPE: ACUTE PAIN
TYPE: ACUTE PAIN

## 2022-09-15 ASSESSMENT — FIBROSIS 4 INDEX
FIB4 SCORE: 0.65
FIB4 SCORE: 1.13

## 2022-09-15 ASSESSMENT — PATIENT HEALTH QUESTIONNAIRE - PHQ9
1. LITTLE INTEREST OR PLEASURE IN DOING THINGS: NOT AT ALL
SUM OF ALL RESPONSES TO PHQ9 QUESTIONS 1 AND 2: 0
2. FEELING DOWN, DEPRESSED, IRRITABLE, OR HOPELESS: NOT AT ALL

## 2022-09-15 NOTE — ED NOTES
Med rec completed per patient with spouse at bedside.  Allergies reviewed with patient.  No outpatient antibiotics in the last 30 days.  Patient's pharmacy: [facility] on [address].

## 2022-09-15 NOTE — ED PROVIDER NOTES
ED Provider Note    CHIEF COMPLAINT  Chief Complaint   Patient presents with    Chest Pain     Reports L sided CP since yesterday with radiation to L arm. Took 324 ASA PTA. Reports also SOB and dizziness.        HPI  Giuseppe Swift is a 47 y.o. male who presents with left-sided chest discomfort starting yesterday afternoon while at work.  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  Patient has a prior history of CAD with LAD stent placement in 2017.  He was followed by Dr. Gee in the past.  Most recently was seen in April.    He states that he works out regularly and goes to the gym and does not experience chest pain with exertion.  Last went to the gym on Monday.  He states that he was at work and not exerting himself at the time when the pain symptoms started in the left side of the chest.  No diaphoresis.  No vomiting.  Radiates to his left shoulder.  He feels short of breath and lightheaded as well.    He last had stress testing done about a year after his stent placement.    He typically does not get chest pain.  He states that this feels similar to when he had his stent placed.  He feels very fatigued.  No fever or cough.  Has some shortness of breath and severe dizziness/lightheadedness.  No fluid overload or leg swelling.      REVIEW OF SYSTEMS  See HPI for further details. All other systems are negative.     PAST MEDICAL HISTORY   has a past medical history of Bowel habit changes (02/2021), Glaucoma, Heart burn, High cholesterol, Hypertension, Myocardial infarct (HCC), Snoring, and Tuberculosis (2008).    SOCIAL HISTORY  Social History     Tobacco Use    Smoking status: Never    Smokeless tobacco: Never   Vaping Use    Vaping Use: Never used   Substance and Sexual Activity    Alcohol use: Not Currently     Comment: occasional, rare    Drug use: No    Sexual activity: Yes     Partners: Female     Birth control/protection: Surgical       SURGICAL HISTORY   has a past surgical history that includes  "fusion, spine, lumbar, plif (2004); vasectomy (2007); upper gi endoscopy,diagnosis (2/22/2021); and egd w/endoscopic ultrasound (2/22/2021).    CURRENT MEDICATIONS  Home Medications       Reviewed by Sylvia Anderson R.N. (Registered Nurse) on 09/15/22 at 1552  Med List Status: Not Addressed     Medication Last Dose Status   ADULT ASPIRIN LOW STRENGTH PO  Active   atorvastatin (LIPITOR) 80 MG tablet  Active   fexofenadine (ALLEGRA) 180 MG tablet  Active   lansoprazole (PREVACID) 30 MG CAPSULE DELAYED RELEASE  Active   levothyroxine (SYNTHROID) 75 MCG Tab  Active   losartan (COZAAR) 50 MG Tab  Active   metoprolol SR (TOPROL XL) 25 MG TABLET SR 24 HR  Active                    ALLERGIES  Allergies   Allergen Reactions    Biofreeze Rash     Pt states fully body rash    Iodine Rash and Itching     Pt. States he broke out from IV contrast     Latex Swelling     Pt. States he also gets blisters    Cat Hair Extract Anxiety, Hives, Itching, Rash, Runny Nose, Shortness of Breath and Swelling    Lisinopril      Cough    Pineapple Unspecified     Tongue swelling and itchy throat, rash per pt    Shellfish Allergy Anaphylaxis       PHYSICAL EXAM  VITAL SIGNS: BP (!) 156/95   Pulse 72   Temp 36.7 °C (98.1 °F) (Temporal)   Resp 20   Ht 1.753 m (5' 9\")   Wt 117 kg (258 lb 13.1 oz)   SpO2 93%   BMI 38.22 kg/m²   Pulse ox interpretation: I interpret this pulse ox as normal.  Constitutional: Alert in no apparent distress.  HENT: No signs of trauma, Bilateral external ears normal, Nose normal.   Eyes: Pupils are equal and reactive, Conjunctiva normal, Non-icteric.   Neck: Normal range of motion, Supple, No stridor.   Cardiovascular: Regular rate and rhythm.   Thorax & Lungs: Normal breath sounds, No respiratory distress, No wheezing  Abdomen: Bowel sounds normal, Soft, No tenderness, No masses, No pulsatile masses. No peritoneal signs.  Skin: Warm, Dry, No erythema, No rash.   Back: No bony tenderness, No CVA tenderness. "   Extremities: Intact distal pulses, No edema, No cyanosis  Musculoskeletal: Good range of motion in all major joints. No major deformities noted.   Neurologic: Alert, No focal deficits noted.       DIAGNOSTIC STUDIES / PROCEDURES    EKG - Physician interpretation  Results for orders placed or performed during the hospital encounter of 09/15/22   EKG   Result Value Ref Range    Report       [facility] of [facility] Emergency Dept.    Test Date:  2022-09-15  Pt Name:    SHELLY MACIAS       Department: [facility]  MRN:        [id number]                      Room:  Gender:     Male                         Technician: [id number]  :        1975                   Requested By:ER TRIAGE PROTOCOL  Order #:    [id number]                    Reading MD: ALEXIS COKER MD    Measurements  Intervals                                Axis  Rate:       72                           P:          55  AZ:         169                          QRS:        50  QRSD:       90                           T:          -6  QT:         364  QTc:        399    Interpretive Statements  Sinus rhythm  Borderline T wave abnormalities  Compared to ECG 2021 08:28:32  Sinus bradycardia no longer present  T-wave abnormality still present  Electronically Signed On 9- 16:13:25 PDT by ALEXIS COKER MD         LABS  Labs Reviewed   CBC WITH DIFFERENTIAL - Abnormal; Notable for the following components:       Result Value    RBC 6.15 (*)     Hematocrit 54.1 (*)     MCHC 32.3 (*)     All other components within normal limits    Narrative:     Biotin intake of greater than 5 mg per day may interfere with  troponin levels, causing false low values.   COMP METABOLIC PANEL - Abnormal; Notable for the following components:    Glucose 147 (*)     All other components within normal limits    Narrative:     Biotin intake of greater than 5 mg per day may interfere with  troponin levels, causing false low values.   TROPONIN    Narrative:     Biotin  intake of greater than 5 mg per day may interfere with  troponin levels, causing false low values.   COV-2, FLU A/B, AND RSV BY PCR ([employer])   ESTIMATED GFR    Narrative:     Biotin intake of greater than 5 mg per day may interfere with  troponin levels, causing false low values.   TROPONIN         RADIOLOGY  DX-CHEST-PORTABLE (1 VIEW)   Final Result      No evidence of acute cardiopulmonary process.            COURSE & MEDICAL DECISION MAKING    Medications   aspirin EC (ECOTRIN) tablet 162 mg (162 mg Oral Given 9/15/22 1805)   atorvastatin (LIPITOR) tablet 80 mg (80 mg Oral Given 9/15/22 1805)   omeprazole (PRILOSEC) capsule 20 mg (has no administration in time range)   levothyroxine (SYNTHROID) tablet 75 mcg (has no administration in time range)   losartan (COZAAR) tablet 50 mg (has no administration in time range)   metoprolol SR (TOPROL XL) tablet 12.5 mg (has no administration in time range)   acetaminophen (Tylenol) tablet 650 mg (has no administration in time range)   polyethylene glycol/lytes (MIRALAX) PACKET 1 Packet (has no administration in time range)     And   magnesium hydroxide (MILK OF MAGNESIA) suspension 30 mL (has no administration in time range)     And   bisacodyl (DULCOLAX) suppository 10 mg (has no administration in time range)   mag hydrox-al hydrox-simeth (MAALOX PLUS ES or MYLANTA DS) suspension 30 mL (has no administration in time range)   rivaroxaban (XARELTO) tablet 10 mg (has no administration in time range)       Pertinent Labs & Imaging studies reviewed. (See chart for details)  47 y.o. male presenting with left-sided chest pain radiating to his left shoulder since yesterday afternoon.  It is persistent since then.  No diaphoresis or vomiting.  No exertional chest pain symptoms.  He does have a history of known CAD status post stenting in 2017 according to the patient.  According to prior medical record, he had LAD stenting.  He has not had a stress test since 2018.  No lower  "extremity swelling or pain.  Symptoms are not consistent with aortic dissection.  EKG does not show acute ischemic findings.  Troponin is negative.    I did review the results with the patient.  He still continues to have chest discomfort and states that his chest pain is similar to prior chest pain he had when he had a heart attack and required stenting.  Patient has a heart score of 4 given history, age, risk factors.  Recommending hospitalization for further management.  Spoke with Dr. Benitez here from the hospitalist service who is agreeable to the hospitalization.  Patient states that he did receive aspirin earlier today prior to arrival.    The patient was instructed to follow-up with primary care physician for further management.  To return immediately for any worsening symptoms or development of any other concerning signs or symptoms. The patient verbalizes understanding in their own words.    BP (!) 161/85   Pulse 73   Temp 37.1 °C (98.8 °F) (Temporal)   Resp 18   Ht 1.753 m (5' 9\")   Wt 117 kg (258 lb 13.1 oz)   SpO2 93%   BMI 38.22 kg/m²       FINAL IMPRESSION  1. Chest pain, unspecified type             Electronically signed by: João Mendoza M.D., 9/15/2022 4:06 PM    "

## 2022-09-16 ENCOUNTER — APPOINTMENT (OUTPATIENT)
Dept: RADIOLOGY | Facility: MEDICAL CENTER | Age: 47
End: 2022-09-16
Attending: HOSPITALIST
Payer: COMMERCIAL

## 2022-09-16 VITALS
WEIGHT: 258.16 LBS | BODY MASS INDEX: 38.24 KG/M2 | TEMPERATURE: 98.3 F | RESPIRATION RATE: 18 BRPM | HEART RATE: 71 BPM | OXYGEN SATURATION: 91 % | DIASTOLIC BLOOD PRESSURE: 64 MMHG | SYSTOLIC BLOOD PRESSURE: 140 MMHG | HEIGHT: 69 IN

## 2022-09-16 LAB
ALBUMIN SERPL BCP-MCNC: 4.2 G/DL (ref 3.2–4.9)
ALBUMIN/GLOB SERPL: 1.6 G/DL
ALP SERPL-CCNC: 65 U/L (ref 30–99)
ALT SERPL-CCNC: 44 U/L (ref 2–50)
ANION GAP SERPL CALC-SCNC: 11 MMOL/L (ref 7–16)
AST SERPL-CCNC: 33 U/L (ref 12–45)
BILIRUB SERPL-MCNC: 0.5 MG/DL (ref 0.1–1.5)
BUN SERPL-MCNC: 10 MG/DL (ref 8–22)
CALCIUM SERPL-MCNC: 9.3 MG/DL (ref 8.4–10.2)
CHLORIDE SERPL-SCNC: 101 MMOL/L (ref 96–112)
CO2 SERPL-SCNC: 27 MMOL/L (ref 20–33)
CREAT SERPL-MCNC: 1.24 MG/DL (ref 0.5–1.4)
D DIMER PPP IA.FEU-MCNC: <0.27 UG/ML (FEU) (ref 0–0.5)
ERYTHROCYTE [DISTWIDTH] IN BLOOD BY AUTOMATED COUNT: 40.3 FL (ref 35.9–50)
GFR SERPLBLD CREATININE-BSD FMLA CKD-EPI: 72 ML/MIN/1.73 M 2
GLOBULIN SER CALC-MCNC: 2.6 G/DL (ref 1.9–3.5)
GLUCOSE SERPL-MCNC: 128 MG/DL (ref 65–99)
HCT VFR BLD AUTO: 54.6 % (ref 42–52)
HGB BLD-MCNC: 17.8 G/DL (ref 14–18)
MAGNESIUM SERPL-MCNC: 1.9 MG/DL (ref 1.5–2.5)
MCH RBC QN AUTO: 28.7 PG (ref 27–33)
MCHC RBC AUTO-ENTMCNC: 32.6 G/DL (ref 33.7–35.3)
MCV RBC AUTO: 87.9 FL (ref 81.4–97.8)
PLATELET # BLD AUTO: 230 K/UL (ref 164–446)
PMV BLD AUTO: 10.4 FL (ref 9–12.9)
POTASSIUM SERPL-SCNC: 3.9 MMOL/L (ref 3.6–5.5)
PROT SERPL-MCNC: 6.8 G/DL (ref 6–8.2)
RBC # BLD AUTO: 6.21 M/UL (ref 4.7–6.1)
SODIUM SERPL-SCNC: 139 MMOL/L (ref 135–145)
TROPONIN T SERPL-MCNC: 12 NG/L (ref 6–19)
WBC # BLD AUTO: 10.1 K/UL (ref 4.8–10.8)

## 2022-09-16 PROCEDURE — 84484 ASSAY OF TROPONIN QUANT: CPT

## 2022-09-16 PROCEDURE — 78452 HT MUSCLE IMAGE SPECT MULT: CPT | Mod: 26 | Performed by: INTERNAL MEDICINE

## 2022-09-16 PROCEDURE — 85379 FIBRIN DEGRADATION QUANT: CPT

## 2022-09-16 PROCEDURE — 700102 HCHG RX REV CODE 250 W/ 637 OVERRIDE(OP): Performed by: HOSPITALIST

## 2022-09-16 PROCEDURE — 80053 COMPREHEN METABOLIC PANEL: CPT

## 2022-09-16 PROCEDURE — 36415 COLL VENOUS BLD VENIPUNCTURE: CPT

## 2022-09-16 PROCEDURE — 99217 PR OBSERVATION CARE DISCHARGE: CPT | Performed by: FAMILY MEDICINE

## 2022-09-16 PROCEDURE — A9502 TC99M TETROFOSMIN: HCPCS

## 2022-09-16 PROCEDURE — A9270 NON-COVERED ITEM OR SERVICE: HCPCS | Performed by: HOSPITALIST

## 2022-09-16 PROCEDURE — 94760 N-INVAS EAR/PLS OXIMETRY 1: CPT

## 2022-09-16 PROCEDURE — 85027 COMPLETE CBC AUTOMATED: CPT

## 2022-09-16 PROCEDURE — G0378 HOSPITAL OBSERVATION PER HR: HCPCS

## 2022-09-16 PROCEDURE — 96374 THER/PROPH/DIAG INJ IV PUSH: CPT

## 2022-09-16 PROCEDURE — 83735 ASSAY OF MAGNESIUM: CPT

## 2022-09-16 PROCEDURE — 93018 CV STRESS TEST I&R ONLY: CPT | Performed by: INTERNAL MEDICINE

## 2022-09-16 PROCEDURE — 700111 HCHG RX REV CODE 636 W/ 250 OVERRIDE (IP)

## 2022-09-16 RX ORDER — REGADENOSON 0.08 MG/ML
INJECTION, SOLUTION INTRAVENOUS
Status: COMPLETED
Start: 2022-09-16 | End: 2022-09-16

## 2022-09-16 RX ADMIN — LOSARTAN POTASSIUM 50 MG: 25 TABLET, FILM COATED ORAL at 05:14

## 2022-09-16 RX ADMIN — LEVOTHYROXINE SODIUM 75 MCG: 0.07 TABLET ORAL at 05:13

## 2022-09-16 RX ADMIN — REGADENOSON 0.4 MG: 0.08 INJECTION, SOLUTION INTRAVENOUS at 11:52

## 2022-09-16 RX ADMIN — OMEPRAZOLE 20 MG: 20 CAPSULE, DELAYED RELEASE ORAL at 05:09

## 2022-09-16 ASSESSMENT — FIBROSIS 4 INDEX: FIB4 SCORE: 1.02

## 2022-09-16 ASSESSMENT — PAIN DESCRIPTION - PAIN TYPE: TYPE: ACUTE PAIN

## 2022-09-16 NOTE — PROGRESS NOTES
Received bedside report from REDD Correia, patient care assumed. VS stable, patient assessment complete. Patient alert and oriented X 4, 7/10 moderate pain, MD notified at this time. POC discussed with patient and questions answered at this time. Patient denies any additional needs at this time. Bed locked and in lowest position, bed alarm on. Patient educated on fall risk and verbalized understanding, call light within reach.

## 2022-09-16 NOTE — DISCHARGE PLANNING
Case Management Discharge Planning    Admission Date: 9/15/2022  GMLOS: 1.8 (Value from CMS.gov Table.)  ALOS: 0    6-Clicks ADL Score: 24  6-Clicks Mobility Score: 24      Anticipated Discharge Dispo:      DME Needed: No    Action(s) Taken: RNCM participated in morning IDT rounds. Patient is pending stress test. If result is negative patient can discharge. No anticipated needs.     Escalations Completed: None    Medically Clear: No    Next Steps: RNCM to be available for any discharge needs.     Barriers to Discharge: Medical clearance and Pending Procedures    Is the patient up for discharge tomorrow: No

## 2022-09-16 NOTE — PROGRESS NOTES
4 Eyes Skin Assessment Completed by REDD Alonso and REDD Hodgson.    Head Scar  Ears WDL  Nose WDL  Mouth WDL  Neck WDL  Breast/Chest WDL  Shoulder Blades WDL  Spine WDL, scar  (R) Arm/Elbow/Hand WDL  (L) Arm/Elbow/Hand WDL  Abdomen WDL  Groin WDL  Scrotum/Coccyx/Buttocks WDL  (R) Leg WDL  (L) Leg WDL  (R) Heel/Foot/Toe WDL  (L) Heel/Foot/Toe WDL          Devices In Places Tele box      Interventions In Place N/A    Possible Skin Injury No    Pictures Uploaded Into Epic N/A  Wound Consult Placed N/A  RN Wound Prevention Protocol Ordered No

## 2022-09-16 NOTE — H&P
Hospital Medicine History & Physical Note    Date of Service  9/15/2022    Primary Care Physician  Manjeet Moyer III, M.D.    Consultants  None     Code Status  Full Code    Chief Complaint  Chief Complaint   Patient presents with    Chest Pain     Reports L sided CP since yesterday with radiation to L arm. Took 324 ASA PTA. Reports also SOB and dizziness.      History of Presenting Illness  Giuseppe Swift is a 47 y.o. male with a past medical history of coronary artery disease s/p stent placement, hypothyroidism, gastroesophageal flux disease, hypertension and hyperlipidemia who presented 9/15/2022 with chest pain.  Patient has been having chest pain over the past 2 days.  The pain is in the retrosternal region and lower left side of the chest.  Pain does radiate to the left arm.  Pain is worse with exertion and 8/10 associated with shortness of breath.  No relieving factors other than rest.  Patient denies having fevers or chills, lower extremity pain, redness or swelling..    I discussed the plan of care with emergency department physician, and the patient.    Review of Systems  Review of Systems   Constitutional:  Negative for chills and fever.   Eyes:  Negative for discharge and redness.   Respiratory:  Negative for cough, shortness of breath and stridor.    Cardiovascular:  Positive for chest pain. Negative for leg swelling.   Gastrointestinal:  Negative for abdominal pain and vomiting.   Genitourinary:  Negative for flank pain.   Musculoskeletal:  Negative for myalgias.   Skin: Negative.    Neurological:  Negative for focal weakness.   Endo/Heme/Allergies:  Does not bruise/bleed easily.   Psychiatric/Behavioral:  The patient is not nervous/anxious.      Past Medical History   has a past medical history of Bowel habit changes (02/2021), Glaucoma, Heart burn, High cholesterol, Hypertension, Myocardial infarct (HCC), Snoring, and Tuberculosis (2008).    Surgical History   has a past surgical history  that includes fusion, spine, lumbar, plif (2004); vasectomy (2007); pr upper gi endoscopy,diagnosis (2/22/2021); and egd w/endoscopic ultrasound (2/22/2021).     Family History  family history includes Diabetes in his mother; No Known Problems in his father.      Social History   reports that he has never smoked. He has never used smokeless tobacco. He reports that he does not currently use alcohol. He reports that he does not use drugs.    Allergies  Allergies   Allergen Reactions    Iodine Hives, Rash and Swelling     Topical - rash  IV contrast - full body hives and swelling    Lisinopril Cough    Pineapple Itching and Swelling     Tongue swelling and itchy throat    Shellfish Allergy Anaphylaxis    Biofreeze Rash     Rash and blisters    Cat Hair Extract Hives, Shortness of Breath, Rash, Runny Nose, Itching, Swelling and Anxiety          Latex Rash           Medications  Prior to Admission Medications   Prescriptions Last Dose Informant Patient Reported? Taking?   MILK THISTLE PO 9/13/2022 at 1100 Patient Yes Yes   Sig: Take 1 Capsule by mouth every day.   Multiple Vitamins-Minerals (MENS MULTIVITAMIN PO) 9/13/2022 at 1100 Patient Yes Yes   Sig: Take 2 Capsules by mouth every day. [facility] brand.   aspirin EC (ECOTRIN) 81 MG Tablet Delayed Response 9/15/2022 at 1000 Patient Yes Yes   Sig: Take 162 mg by mouth every morning. 2 tablets = 162 mg.   atorvastatin (LIPITOR) 80 MG tablet 9/14/2022 at 2200 Patient No No   Sig: Take 1 Tablet by mouth every evening.   fexofenadine (ALLEGRA) 180 MG tablet 9/14/2022 at 1300 Patient Yes No   Sig: Take 180 mg by mouth every day.   lansoprazole (PREVACID) 30 MG CAPSULE DELAYED RELEASE 9/15/2022 at 1400 Patient No No   Sig: Take 1 Capsule by mouth every day.   levothyroxine (SYNTHROID) 75 MCG Tab 9/15/2022 at 0700 Patient No No   Sig: Take 1 Tablet by mouth every morning on an empty stomach.   losartan (COZAAR) 50 MG Tab 9/14/2022 at 2200 Patient No No   Sig: Take 1 Tablet by mouth  every day.   metoprolol SR (TOPROL XL) 25 MG TABLET SR 24 HR 9/15/2022 at 1000 Patient No No   Sig: Take 0.5 Tablets by mouth every day.   vitamin D3 (CHOLECALCIFEROL) 1000 Unit (25 mcg) Tab 9/13/2022 at 1100 Patient Yes Yes   Sig: Take 1,000 Units by mouth every day.      Facility-Administered Medications: None     Physical Exam  Temp:  [36.7 °C (98.1 °F)-37.1 °C (98.8 °F)] 37.1 °C (98.8 °F)  Pulse:  [72-77] 77  Resp:  [18-20] 19  BP: (156-178)/(85-95) 178/90  SpO2:  [93 %-94 %] 94 %  Blood Pressure: (!) 161/85   Temperature: 37.1 °C (98.8 °F)   Pulse: 73   Respiration: 18   Pulse Oximetry: 93 %     Physical Exam  Constitutional:       General: He is not in acute distress.     Appearance: He is not ill-appearing or diaphoretic.   HENT:      Head: Atraumatic.      Right Ear: External ear normal.      Left Ear: External ear normal.      Nose: No congestion or rhinorrhea.      Mouth/Throat:      Mouth: Mucous membranes are moist.   Eyes:      General: No scleral icterus.        Right eye: No discharge.         Left eye: No discharge.      Pupils: Pupils are equal, round, and reactive to light.   Cardiovascular:      Rate and Rhythm: Normal rate and regular rhythm.   Pulmonary:      Effort: Pulmonary effort is normal.   Abdominal:      General: There is no distension.   Musculoskeletal:      Cervical back: Neck supple. No rigidity. No muscular tenderness.      Right lower leg: No edema.      Left lower leg: No edema.   Skin:     Coloration: Skin is not jaundiced or pale.   Neurological:      Mental Status: He is alert and oriented to person, place, and time.      Coordination: Coordination normal.   Psychiatric:         Mood and Affect: Mood normal.         Behavior: Behavior normal.     Laboratory:  Recent Labs     09/15/22  1557   WBC 10.1   RBC 6.15*   HEMOGLOBIN 17.5   HEMATOCRIT 54.1*   MCV 88.0   MCH 28.5   MCHC 32.3*   RDW 40.5   PLATELETCT 241   MPV 10.3     Recent Labs     09/15/22  1557   SODIUM 139    POTASSIUM 4.4   CHLORIDE 102   CO2 26   GLUCOSE 147*   BUN 9   CREATININE 1.27   CALCIUM 9.4     Recent Labs     09/15/22  1557   ALTSGPT 50   ASTSGOT 41   ALKPHOSPHAT 72   TBILIRUBIN 0.6   GLUCOSE 147*         No results for input(s): NTPROBNP in the last 72 hours.      Recent Labs     09/15/22  1557   TROPONINT 10     Imaging:  DX-CHEST-PORTABLE (1 VIEW)   Final Result      No evidence of acute cardiopulmonary process.      NM-CARDIAC STRESS TEST    (Results Pending)     I personally reviewed patient EKG which shows sinus rhythm with a rate of 72, there is no ST elevation, there is T wave inversion in lead III, flattening of T wave in lead V5-6, QTc 399.    Assessment/Plan:  Justification for Admission Status  I anticipate this patient is appropriate for observation status at this time because likely discharge after 1 midnight.    Patient will need a bed on telemetry as patient has chest pain     Pain in the chest- (present on admission)  Assessment & Plan  EKG shows, shows sinus rhythm with a rate of 72, there is no ST elevation, there is T wave inversion in lead III, flattening of T wave in lead V5-6, QTc 399.  Initial troponin is 10, I will trend  Stat EKG and troponin for recurrence of chest pain.   Continuous cardiac monitoring.  Plan for stress test in the morning [ordered] as long as there are no significant EKG changes or significant troponin elevation     Acquired hypothyroidism- (present on admission)  Assessment & Plan  Resume home levothyroxine    GERD (gastroesophageal reflux disease)- (present on admission)  Assessment & Plan  Resume home PPI    Pure hypercholesterolemia- (present on admission)  Assessment & Plan  Resume home atorvastatin    VTE prophylaxis: SCDs/TEDs and Xarelto 10 mg daily as prophylaxis

## 2022-09-16 NOTE — PROGRESS NOTES
Telemetry Shift Summary    Rhythm Sinus Rhythm  HR Range 52-72  Ectopy rare PVC  Measurements .16/.08/.36        Normal Values  Rhythm SR  HR Range    Measurements 0.12-0.20 / 0.06-0.10  / 0.30-0.52

## 2022-09-16 NOTE — CARE PLAN
The patient is Stable - Low risk of patient condition declining or worsening    Shift Goals  Clinical Goals: Stress Test  Patient Goals: Comfort    Progress made toward(s) clinical / shift goals:    Problem: Knowledge Deficit - Standard  Goal: Patient and family/care givers will demonstrate understanding of plan of care, disease process/condition, diagnostic tests and medications  Outcome: Progressing  Note: Discussed POC with patient and expected procedures for the day. Declines any concerns at this time     Problem: Mobility  Goal: Patient's capacity to carry out activities will improve  Outcome: Progressing  Flowsheets (Taken 9/16/2022 8719)  Assistance: No Assistance Required  Note: Pt being seen by PT/OT/SLP for stroke workup, patient MSK assessment is WDL       Patient is not progressing towards the following goals:

## 2022-09-16 NOTE — CARE PLAN
The patient is Stable - Low risk of patient condition declining or worsening    Shift Goals  Clinical Goals: Stress test in the morning, NPO 0000  Patient Goals: rest comfortably    Progress made toward(s) clinical / shift goals:    Problem: Pain - Standard  Goal: Alleviation of pain or a reduction in pain to the patient’s comfort goal  Outcome: Progressing  Note: Patient received medication and ice pack for pain management. Patient able to verbalize needs for pain management. Pain has not increased.      Problem: Knowledge Deficit - Standard  Goal: Patient and family/care givers will demonstrate understanding of plan of care, disease process/condition, diagnostic tests and medications  Outcome: Progressing  Note: POC discussed with patient. All questions answered. Education provided on medications and diagnostic tests.        Patient is not progressing towards the following goals: NA

## 2022-09-16 NOTE — ASSESSMENT & PLAN NOTE
EKG shows, shows sinus rhythm with a rate of 72, there is no ST elevation, there is T wave inversion in lead III, flattening of T wave in lead V5-6, QTc 399.  Initial troponin is 10, I will trend  Stat EKG and troponin for recurrence of chest pain.   Continuous cardiac monitoring.  Plan for stress test in the morning [ordered] as long as there are no significant EKG changes or significant troponin elevation

## 2022-09-16 NOTE — DISCHARGE INSTRUCTIONS
Angina    Angina is very bad discomfort or pain in the chest, neck, arm, jaw, or back. The discomfort is caused by a lack of blood in the middle layer of the heart wall (myocardium).  What are the causes?  This condition is caused by a buildup of fat and cholesterol (plaque) in your arteries (atherosclerosis). This buildup narrows the arteries and makes it hard for blood to flow.  What increases the risk?  You are more likely to develop this condition if:  You have high levels of cholesterol in your blood.  You have high blood pressure (hypertension).  You have diabetes.  You have a family history of heart disease.  You are not active, or you do not exercise enough.  You feel sad (depressed).  You have been treated with high energy rays (radiation) on the left side of your chest.  Other risk factors are:  Using tobacco.  Being very overweight (obese).  Eating a diet high in unhealthy fats (saturated fats).  Having stress, or being exposed to things that cause stress.  Using drugs, such as cocaine.  Women have a greater risk for angina if:  They are older than 55.  They have stopped having their period (are in postmenopause).  What are the signs or symptoms?  Common symptoms of this condition in both men and women may include:  Chest pain, which may:  Feel like a crushing or squeezing in the chest.  Feel like a tightness, pressure, fullness, or heaviness in the chest.  Last for more than a few minutes at a time.  Stop and come back (recur) after a few minutes.  Pain in the neck, arm, jaw, or back.  Heartburn or upset stomach (indigestion) for no reason.  Being short of breath.  Feeling sick to your stomach (nauseous).  Sudden cold sweats.  Women and people with diabetes may have other symptoms that are not usual, such as feeling:  Tired (fatigue).  Worried or nervous (anxious) for no reason.  Weak for no reason.  Dizzy or passing out (fainting).  How is this treated?  This condition may be treated with:  Medicines.  These are given to:  Prevent blood clots.  Prevent heart attack.  Relax blood vessels and improve blood flow to the heart (nitrates).  Reduce blood pressure.  Improve the pumping action of the heart.  Reduce fat and cholesterol in the blood.  A procedure to widen a narrowed or blocked artery in the heart (angioplasty).  Surgery to allow blood to go around a blocked artery (coronary artery bypass surgery).  Follow these instructions at home:  Medicines  Take over-the-counter and prescription medicines only as told by your doctor.  Do not take these medicines unless your doctor says that you can:  NSAIDs. These include:  Ibuprofen.  Naproxen.  Vitamin supplements that have vitamin A, vitamin E, or both.  Hormone therapy that contains estrogen with or without progestin.  Eating and drinking    Eat a heart-healthy diet that includes:  Lots of fresh fruits and vegetables.  Whole grains.  Low-fat (lean) protein.  Low-fat dairy products.  Follow instructions from your doctor about what you cannot eat or drink.  Activity  Follow an exercise program that your doctor tells you.  Talk with your doctor about joining a program to help improve the health of your heart (cardiac rehab).  When you feel tired, take a break. Plan breaks if you know you are going to feel tired.  Lifestyle    Do not use any products that contain nicotine or tobacco. This includes cigarettes, e-cigarettes, and chewing tobacco. If you need help quitting, ask your doctor.  If your doctor says you can drink alcohol:  Limit how much you use to:  0-1 drink a day for women who are not pregnant.  0-2 drinks a day for men.  Be aware of how much alcohol is in your drink. In the [place] one drink equals:  One 12 oz bottle of beer (355 mL).  One 5 oz glass of wine (148 mL).  One 1½ oz glass of hard liquor (44 mL).  General instructions  Stay at a healthy weight. If your doctor tells you to do so, work with him or her to lose weight.  Learn to deal with stress. If you  need help, ask your doctor.  Keep your vaccines up to date. Get a flu shot every year.  Talk with your doctor if you feel sad. Take a screening test to see if you are at risk for depression.  Work with your doctor to manage any other health problems that you have. These may include diabetes or high blood pressure.  Keep all follow-up visits as told by your doctor. This is important.  Get help right away if:  You have pain in your chest, neck, arm, jaw, or back, and the pain:  Lasts more than a few minutes.  Comes back.  Does not get better after you take medicine under your tongue (sublingual nitroglycerin).  Keeps getting worse.  Comes more often.  You have any of these problems for no reason:  Sweating a lot.  Heartburn or upset stomach.  Shortness of breath.  Trouble breathing.  Feeling sick to your stomach.  Throwing up (vomiting).  Feeling more tired than normal.  Feeling nervous or worrying more than normal.  Weakness.  You are suddenly dizzy or light-headed.  You pass out.  These symptoms may be an emergency. Do not wait to see if the symptoms will go away. Get medical help right away. Call your local emergency services (911 in the [place] Do not drive yourself to the hospital.  Summary  Angina is very bad discomfort or pain in the chest, neck, arm, neck, or back.  Symptoms include chest pain, heartburn or upset stomach for no reason, and shortness of breath.  Women or people with diabetes may have symptoms that are not usual, such as feeling nervous or worried for no reason, weak for no reason, or tired.  Take all medicines only as told by your doctor.  You should eat a heart-healthy diet and follow an exercise program.  This information is not intended to replace advice given to you by your health care provider. Make sure you discuss any questions you have with your health care provider.  Document Released: 06/05/2009 Document Revised: 08/05/2019 Document Reviewed: 08/05/2019  Elsevier Patient Education ©  2020 [employer]

## 2022-09-17 NOTE — PROGRESS NOTES
Telemetry Shift Summary     Rhythm: SR  Rate: 70s-110s  Measurements: 0.16/0.08/0.28  Ectopy (reported by Monitor Tech): rPVC     Normal Values  Rhythm: Sinus  HR:   Measurements: 0.12-0.20/0.06-0.10/0.30-0.52

## 2022-09-17 NOTE — DISCHARGE SUMMARY
Discharge Summary    CHIEF COMPLAINT ON ADMISSION  Chief Complaint   Patient presents with    Chest Pain     Reports L sided CP since yesterday with radiation to L arm. Took 324 ASA PTA. Reports also SOB and dizziness.        Reason for Admission  sent by MD, chest pain     Admission Date  9/15/2022    CODE STATUS  Prior    HPI & HOSPITAL COURSE  This is a 47 y.o. male here with a history of CAD with stenting in 2017, hypothyroidism, GERD, HTN and HLD who presented to hospital with intermittent chest pain on the lower side of the L chest that radiated into the left arm.  The pain was worse with exertion and he did have associated SOB.   On arrival to the ER, pt had some elevated blood pressures up to 178/90, and states that he had forgotten to take his antihypertensives more often recently. He had normal troponins that were flat, and a negative COVID test, and a non ischemic EKG, as well as a negative d-dimer.  He underwent stress testing that showed no evidence of ischemia or infarct, no arrhythmias, and the test illicited no chest pain. The patient did have chest wall tenderness to palpation on examination.  His chest pain was non-ischemic in nature and may have been secondary to poorly controlled blood pressures or musculoskeletal in nature given his chest wall tenderness.  He was previously established with Dr Gee who no longer practices with [facility], so I have reached out to our schedulers to obtain an appt for the patient with a new provider with [facility]. In the meantime, I have encouraged him to be mindful of taking his blood pressure medication reliably.  As the patient is no longer having chest pain, he is stable for discharge.       Therefore, he is discharged in good and stable condition to home with close outpatient follow-up.    The patient recovered much more quickly than anticipated on admission.    Discharge Date  9/16/2022    FOLLOW UP ITEMS POST DISCHARGE  Patient to re-establish with  a new Cardiology provider - I have asked our schedulers to assist with the same. Patient to follow up with PCP in one week for BP recheck.     DISCHARGE DIAGNOSES  Principal Problem:    Chest pain POA: Yes  Active Problems:    Pure hypercholesterolemia POA: Yes    GERD (gastroesophageal reflux disease) POA: Yes    Acquired hypothyroidism POA: Yes    Diabetes mellitus (HCC) POA: Yes    Pain in the chest POA: Yes  Resolved Problems:    * No resolved hospital problems. *      FOLLOW UP  Future Appointments   Date Time Provider Department Center   9/27/2022  9:20 AM ZENIA Reid III [place]     No follow-up provider specified.    MEDICATIONS ON DISCHARGE     Medication List        CONTINUE taking these medications        Instructions   aspirin EC 81 MG Tbec  Commonly known as: ECOTRIN   Take 162 mg by mouth every morning. 2 tablets = 162 mg.  Dose: 162 mg     atorvastatin 80 MG tablet  Commonly known as: LIPITOR   Take 1 Tablet by mouth every evening.  Dose: 80 mg     fexofenadine 180 MG tablet  Commonly known as: ALLEGRA   Take 180 mg by mouth every day.  Dose: 180 mg     lansoprazole 30 MG Cpdr  Commonly known as: PREVACID   Take 1 Capsule by mouth every day.  Dose: 30 mg     levothyroxine 75 MCG Tabs  Commonly known as: SYNTHROID   Take 1 Tablet by mouth every morning on an empty stomach.  Dose: 75 mcg     losartan 50 MG Tabs  Commonly known as: COZAAR   Take 1 Tablet by mouth every day.  Dose: 50 mg     MENS MULTIVITAMIN PO   Take 2 Capsules by mouth every day. [facility] brand.  Dose: 2 Capsule     metoprolol SR 25 MG Tb24  Commonly known as: TOPROL XL   Doctor's comments: Please call [phone] to schedule a follow up visit thank you  Take 0.5 Tablets by mouth every day.  Dose: 12.5 mg     MILK THISTLE PO   Take 1 Capsule by mouth every day.  Dose: 1 Capsule     vitamin D3 1000 Unit (25 mcg) Tabs  Commonly known as: cholecalciferol   Take 1,000 Units by mouth every day.  Dose: 1,000 Units               Allergies  Allergies   Allergen Reactions    Iodine Hives, Rash and Swelling     Topical - rash  IV contrast - full body hives and swelling    Lisinopril Cough    Pineapple Itching and Swelling     Tongue swelling and itchy throat    Shellfish Allergy Anaphylaxis    Biofreeze Rash     Rash and blisters    Cat Hair Extract Hives, Shortness of Breath, Rash, Runny Nose, Itching, Swelling and Anxiety          Latex Rash             DIET  No orders of the defined types were placed in this encounter.      ACTIVITY  As tolerated.  Weight bearing as tolerated    CONSULTATIONS  None    PROCEDURES  Stress testing    LABORATORY  Lab Results   Component Value Date    SODIUM 139 09/16/2022    POTASSIUM 3.9 09/16/2022    CHLORIDE 101 09/16/2022    CO2 27 09/16/2022    GLUCOSE 128 (H) 09/16/2022    BUN 10 09/16/2022    CREATININE 1.24 09/16/2022    CREATININE 1.0 01/26/2006        Lab Results   Component Value Date    WBC 10.1 09/16/2022    HEMOGLOBIN 17.8 09/16/2022    HEMATOCRIT 54.6 (H) 09/16/2022    PLATELETCT 230 09/16/2022        Total time of the discharge process exceeds 31 minutes.

## 2022-09-19 ENCOUNTER — PATIENT OUTREACH (OUTPATIENT)
Dept: SCHEDULING | Facility: IMAGING CENTER | Age: 47
End: 2022-09-19
Payer: COMMERCIAL

## 2022-09-27 ENCOUNTER — OFFICE VISIT (OUTPATIENT)
Dept: MEDICAL GROUP | Facility: PHYSICIAN GROUP | Age: 47
End: 2022-09-27
Payer: COMMERCIAL

## 2022-09-27 VITALS
BODY MASS INDEX: 37.84 KG/M2 | OXYGEN SATURATION: 95 % | HEIGHT: 69 IN | TEMPERATURE: 98.6 F | WEIGHT: 255.5 LBS | RESPIRATION RATE: 18 BRPM | DIASTOLIC BLOOD PRESSURE: 84 MMHG | HEART RATE: 80 BPM | SYSTOLIC BLOOD PRESSURE: 136 MMHG

## 2022-09-27 DIAGNOSIS — I25.10 CORONARY ARTERY DISEASE INVOLVING NATIVE CORONARY ARTERY OF NATIVE HEART WITHOUT ANGINA PECTORIS: ICD-10-CM

## 2022-09-27 DIAGNOSIS — E11.9 TYPE 2 DIABETES MELLITUS WITHOUT COMPLICATION, WITHOUT LONG-TERM CURRENT USE OF INSULIN (HCC): ICD-10-CM

## 2022-09-27 DIAGNOSIS — R07.9 CHEST PAIN, UNSPECIFIED TYPE: ICD-10-CM

## 2022-09-27 DIAGNOSIS — I10 PRIMARY HYPERTENSION: ICD-10-CM

## 2022-09-27 DIAGNOSIS — J01.90 ACUTE NON-RECURRENT SINUSITIS, UNSPECIFIED LOCATION: ICD-10-CM

## 2022-09-27 PROBLEM — R10.12 ABDOMINAL DISCOMFORT IN LEFT UPPER QUADRANT: Status: RESOLVED | Noted: 2022-05-02 | Resolved: 2022-09-27

## 2022-09-27 PROCEDURE — 99214 OFFICE O/P EST MOD 30 MIN: CPT | Performed by: FAMILY MEDICINE

## 2022-09-27 RX ORDER — AZITHROMYCIN 250 MG/1
TABLET, FILM COATED ORAL
Qty: 6 TABLET | Refills: 0 | Status: SHIPPED | OUTPATIENT
Start: 2022-09-27 | End: 2023-10-24

## 2022-09-27 ASSESSMENT — FIBROSIS 4 INDEX: FIB4 SCORE: 1.02

## 2022-09-27 NOTE — ASSESSMENT & PLAN NOTE
This is a chronic problem.  Patient was reminded to do his lab test today and if his hemoglobin A1c is still above 7 then we will start him on metformin.

## 2022-09-27 NOTE — ASSESSMENT & PLAN NOTE
This is an acute problem.  Patient been having troubles with sinus drainage and loss of taste since the smoking and the area about 2 weeks ago.  He states he is having sinus drainage is milky in color.  He is taking his Allegra on a regular basis.  He does not like to use the Flonase nasal spray as it dries him out too much.  No fever or chills.

## 2022-09-27 NOTE — ASSESSMENT & PLAN NOTE
This is a chronic problem.  Patient has stent placed in 2018.  In referral for him to establish with a new cardiologist will be made.

## 2022-09-27 NOTE — PROGRESS NOTES
"Subjective:     CC: Here for several issues and follow-up on emergency room visit from the 15th.    HPI:   Giuseppe presents today with the following medical concerns:    Chest pain  This is a new problem.  Patient seen in the emergency room on the 15th for complaint of chest pain.  This was during that time there was a lot of smoke in the area.  He had laboratory he had an exercise stress test done all of which were normal.  He had not been taking his medicines quite consistently but he is improved on that.  The chest pain has not recurred.  He was supposed to be referred to cardiology as his previous provider has left.  I do not see that in the chart so we will redo that for him today.      Acute non-recurrent sinusitis  This is an acute problem.  Patient been having troubles with sinus drainage and loss of taste since the smoking and the area about 2 weeks ago.  He states he is having sinus drainage is milky in color.  He is taking his Allegra on a regular basis.  He does not like to use the Flonase nasal spray as it dries him out too much.  No fever or chills.    Coronary artery disease involving native coronary artery of native heart without angina pectoris  This is a chronic problem.  Patient has stent placed in 2018.  In referral for him to establish with a new cardiologist will be made.    Primary hypertension  This is a chronic problem.  Patient is here for follow-up on his blood pressure.  He is now taking his medicines are on a more consistent basis.    Diabetes mellitus (HCC)  This is a chronic problem.  Patient was reminded to do his lab test today and if his hemoglobin A1c is still above 7 then we will start him on metformin.    Past Medical History:   Diagnosis Date    Bowel habit changes 02/2021    constipation and diarrhea    Glaucoma     L eye    Heart burn     High cholesterol     Hypertension     Myocardial infarct (HCC)     Snoring     Tuberculosis 2008    no treatment, \"not active\"       Social " History     Tobacco Use    Smoking status: Never    Smokeless tobacco: Never   Vaping Use    Vaping Use: Never used   Substance Use Topics    Alcohol use: Not Currently     Comment: occasional, rare    Drug use: No       Current Outpatient Medications Ordered in Epic   Medication Sig Dispense Refill    azithromycin (ZITHROMAX) 250 MG Tab Take 2 po today and then 1 a day 6 Tablet 0    aspirin EC (ECOTRIN) 81 MG Tablet Delayed Response Take 162 mg by mouth every morning. 2 tablets = 162 mg.      MILK THISTLE PO Take 1 Capsule by mouth every day.      vitamin D3 (CHOLECALCIFEROL) 1000 Unit (25 mcg) Tab Take 1,000 Units by mouth every day.      Multiple Vitamins-Minerals (MENS MULTIVITAMIN PO) Take 2 Capsules by mouth every day. [facility] brand.      lansoprazole (PREVACID) 30 MG CAPSULE DELAYED RELEASE Take 1 Capsule by mouth every day. 90 Capsule 3    atorvastatin (LIPITOR) 80 MG tablet Take 1 Tablet by mouth every evening. 90 Tablet 7    losartan (COZAAR) 50 MG Tab Take 1 Tablet by mouth every day. 90 Tablet 7    metoprolol SR (TOPROL XL) 25 MG TABLET SR 24 HR Take 0.5 Tablets by mouth every day. 45 Tablet 7    levothyroxine (SYNTHROID) 75 MCG Tab Take 1 Tablet by mouth every morning on an empty stomach. 90 Tablet 3    fexofenadine (ALLEGRA) 180 MG tablet Take 180 mg by mouth every day.       No current [facility]-ordered facility-administered medications on file.       Allergies:  Iodine, Lisinopril, Pineapple, Shellfish allergy, Biofreeze, Cat hair extract, and Latex    Health Maintenance: Completed    ROS:  Gen: no fevers/chills, no changes in weight  Eyes: no changes in vision  ENT: no sore throat, no hearing loss, no bloody nose  Pulm: no sob, no cough  CV: no chest pain now, no palpitations  GI: no nausea/vomiting, no diarrhea  : no dysuria  MSk: no myalgias  Skin: no rash  Neuro: no headaches, no numbness/tingling  Heme/Lymph: no easy bruising      Objective:       Exam:  /84 (BP Location: Right arm, Patient  "Position: Sitting, BP Cuff Size: Large adult)   Pulse 80   Temp 37 °C (98.6 °F) (Temporal)   Resp 18   Ht 1.753 m (5' 9\")   Wt 116 kg (255 lb 8 oz)   SpO2 95%   BMI 37.73 kg/m²  Body mass index is 37.73 kg/m².    Gen: Alert and oriented, No apparent distress.  ENT:    Ear canals and TMs are normal.  Nose has moderate congestion with yellow drainage on the left.  Throat is clear.  Neck: Neck is supple without lymphadenopathy.  Lungs: Normal effort, CTA bilaterally, no wheezes, rhonchi, or rales  CV: Regular rate and rhythm. No murmurs, rubs, or gallops.  Ext: No clubbing, cyanosis, edema.      Labs: Reviewed    Assessment & Plan:     47 y.o. male with the following -     1. Coronary artery disease involving native coronary artery of native heart without angina pectoris  This is a chronic problem.  Referral to cardiology made.  He needs to establish with a new provider.  - REFERRAL TO CARDIOLOGY    2. Primary hypertension  This is a chronic problem under good control.  Continue to follow.    3. Chest pain, unspecified type  This is an acute problem.  It did not appear to be related to his cardiac condition and most likely was from the smoke in the area in conjunction with sinus infection.    4. Acute non-recurrent sinusitis, unspecified location  This is an acute problem.  We will start him on antibiotics since his symptoms have lasted more than 2 weeks.  Continue the Allegra.    5. Type 2 diabetes mellitus without complication, without long-term current use of insulin (HCC)  This is a chronic problem.  Patient is to do his labs today.  If the hemoglobin A1c is above 7 we will start him on metformin.      Return if symptoms worsen or fail to improve.    Please note that this dictation was created using voice recognition software. I have made every reasonable attempt to correct obvious errors, but I expect that there are errors of grammar and possibly content that I did not discover before finalizing the " note.

## 2022-09-27 NOTE — ASSESSMENT & PLAN NOTE
This is a new problem.  Patient seen in the emergency room on the 15th for complaint of chest pain.  This was during that time there was a lot of smoke in the area.  He had laboratory he had an exercise stress test done all of which were normal.  He had not been taking his medicines quite consistently but he is improved on that.  The chest pain has not recurred.  He was supposed to be referred to cardiology as his previous provider has left.  I do not see that in the chart so we will redo that for him today.

## 2022-09-27 NOTE — ASSESSMENT & PLAN NOTE
This is a chronic problem.  Patient is here for follow-up on his blood pressure.  He is now taking his medicines are on a more consistent basis.

## 2022-11-28 DIAGNOSIS — E03.9 ACQUIRED HYPOTHYROIDISM: ICD-10-CM

## 2022-11-29 RX ORDER — LEVOTHYROXINE SODIUM 0.07 MG/1
TABLET ORAL
Qty: 90 TABLET | Refills: 1 | Status: SHIPPED | OUTPATIENT
Start: 2022-11-29 | End: 2023-10-24 | Stop reason: SDUPTHER

## 2022-12-01 DIAGNOSIS — E78.00 PURE HYPERCHOLESTEROLEMIA: ICD-10-CM

## 2022-12-01 DIAGNOSIS — I10 PRIMARY HYPERTENSION: ICD-10-CM

## 2022-12-01 DIAGNOSIS — R03.0 PRE-HYPERTENSION: ICD-10-CM

## 2022-12-03 NOTE — TELEPHONE ENCOUNTER
Is the patient due for a refill? Yes    Was the patient seen the past year? Yes    Date of last office visit: 4/11/22    Does the patient have an upcoming appointment?  No   If yes, When?     Provider to refill:SANDIE BUCKLEY  ,  [facility]  ADD    Does the patients insurance require a 100 day supply?  No

## 2022-12-05 RX ORDER — METOPROLOL SUCCINATE 25 MG/1
12.5 TABLET, EXTENDED RELEASE ORAL DAILY
Qty: 45 TABLET | Refills: 1 | OUTPATIENT
Start: 2022-12-05

## 2022-12-05 RX ORDER — LOSARTAN POTASSIUM 50 MG/1
50 TABLET ORAL DAILY
Qty: 90 TABLET | Refills: 1 | OUTPATIENT
Start: 2022-12-05

## 2022-12-05 RX ORDER — ATORVASTATIN CALCIUM 80 MG/1
80 TABLET, FILM COATED ORAL EVERY EVENING
Qty: 90 TABLET | Refills: 1 | OUTPATIENT
Start: 2022-12-05

## 2022-12-05 NOTE — TELEPHONE ENCOUNTER
Medication refill refused due to patient still having active refills. [employer] [employer] message sent to patient to verify with pharmacy, as well to schedule follow up appointment to establish with new cardiologist.

## 2023-05-03 ENCOUNTER — TELEPHONE (OUTPATIENT)
Dept: PHYSICAL THERAPY | Facility: REHABILITATION | Age: 48
End: 2023-05-03

## 2023-05-03 NOTE — OP THERAPY DISCHARGE SUMMARY
Outpatient Physical Therapy  DISCHARGE SUMMARY NOTE      [facility] [place]  [address], [address]  [place] [address]  Phone:  [phone]  Fax:  [phone]    Date of Visit: 05/03/2023    Patient: Giuseppe Swift  YOB: 1975  MRN: [id number]     Referring Provider: Neela Dillard M.D.   Referring Diagnosis Suprascapular neuropathy, left [G56.82];Myalgia [M79.10];Cervicalgia [M54.2]           Your patient is being discharged from Physical Therapy with the following comments:   Patient cancelled or missed more than 2 scheduled appointments/non-compliant    Comments:  Giuseppe Swift has been discharged due to a lapse in care greater than 30 days. Thank you for the opportunity to assist you and your patient.      Limitations Remaining:  Unknown    Recommendations:  Patient will now be discharged due to a lapse in the plan of care. Administrative discharge to close this case.      David Davila, PT, DPT    Date: 5/3/2023

## 2023-06-26 DIAGNOSIS — K21.9 GASTROESOPHAGEAL REFLUX DISEASE: ICD-10-CM

## 2023-06-26 RX ORDER — LANSOPRAZOLE 30 MG/1
CAPSULE, DELAYED RELEASE ORAL
Qty: 90 CAPSULE | Refills: 0 | Status: SHIPPED | OUTPATIENT
Start: 2023-06-26 | End: 2023-09-15

## 2023-08-31 ENCOUNTER — TELEPHONE (OUTPATIENT)
Dept: HEALTH INFORMATION MANAGEMENT | Facility: OTHER | Age: 48
End: 2023-08-31
Payer: COMMERCIAL

## 2023-09-15 DIAGNOSIS — K21.9 GASTROESOPHAGEAL REFLUX DISEASE: ICD-10-CM

## 2023-09-15 RX ORDER — LANSOPRAZOLE 30 MG/1
CAPSULE, DELAYED RELEASE ORAL
Qty: 30 CAPSULE | Refills: 0 | Status: SHIPPED | OUTPATIENT
Start: 2023-09-15 | End: 2023-10-24 | Stop reason: SDUPTHER

## 2023-09-15 NOTE — TELEPHONE ENCOUNTER
Received request via: Pharmacy    Was the patient seen in the last year in this department? No    Does the patient have an active prescription (recently filled or refills available) for medication(s) requested? No    Does the patient have long-term Plus and need 100 day supply (blood pressure, diabetes and cholesterol meds only)? Patient does not have SCP

## 2023-10-24 ENCOUNTER — APPOINTMENT (OUTPATIENT)
Dept: MEDICAL GROUP | Facility: PHYSICIAN GROUP | Age: 48
End: 2023-10-24
Payer: COMMERCIAL

## 2023-10-24 ENCOUNTER — OFFICE VISIT (OUTPATIENT)
Dept: MEDICAL GROUP | Facility: PHYSICIAN GROUP | Age: 48
End: 2023-10-24
Payer: COMMERCIAL

## 2023-10-24 ENCOUNTER — HOSPITAL ENCOUNTER (OUTPATIENT)
Dept: LAB | Facility: MEDICAL CENTER | Age: 48
End: 2023-10-24
Attending: FAMILY MEDICINE
Payer: COMMERCIAL

## 2023-10-24 VITALS
DIASTOLIC BLOOD PRESSURE: 74 MMHG | SYSTOLIC BLOOD PRESSURE: 126 MMHG | OXYGEN SATURATION: 97 % | BODY MASS INDEX: 35.84 KG/M2 | WEIGHT: 242 LBS | HEIGHT: 69 IN | TEMPERATURE: 97 F | HEART RATE: 66 BPM

## 2023-10-24 DIAGNOSIS — E03.9 ACQUIRED HYPOTHYROIDISM: ICD-10-CM

## 2023-10-24 DIAGNOSIS — I10 DIABETES MELLITUS WITH COINCIDENT HYPERTENSION (HCC): ICD-10-CM

## 2023-10-24 DIAGNOSIS — K21.9 GASTROESOPHAGEAL REFLUX DISEASE: ICD-10-CM

## 2023-10-24 DIAGNOSIS — E11.9 DIABETES MELLITUS WITH COINCIDENT HYPERTENSION (HCC): ICD-10-CM

## 2023-10-24 DIAGNOSIS — Z00.00 WELLNESS EXAMINATION: ICD-10-CM

## 2023-10-24 DIAGNOSIS — E66.9 OBESITY (BMI 30-39.9): ICD-10-CM

## 2023-10-24 PROBLEM — J01.90 ACUTE NON-RECURRENT SINUSITIS: Status: RESOLVED | Noted: 2022-09-27 | Resolved: 2023-10-24

## 2023-10-24 LAB
ALBUMIN SERPL BCP-MCNC: 4.7 G/DL (ref 3.2–4.9)
ALBUMIN/GLOB SERPL: 1.8 G/DL
ALP SERPL-CCNC: 79 U/L (ref 30–99)
ALT SERPL-CCNC: 48 U/L (ref 2–50)
ANION GAP SERPL CALC-SCNC: 12 MMOL/L (ref 7–16)
APPEARANCE UR: CLEAR
AST SERPL-CCNC: 32 U/L (ref 12–45)
BACTERIA #/AREA URNS HPF: NEGATIVE /HPF
BASOPHILS # BLD AUTO: 0.8 % (ref 0–1.8)
BASOPHILS # BLD: 0.06 K/UL (ref 0–0.12)
BILIRUB SERPL-MCNC: 0.7 MG/DL (ref 0.1–1.5)
BILIRUB UR QL STRIP.AUTO: NEGATIVE
BUN SERPL-MCNC: 17 MG/DL (ref 8–22)
CALCIUM ALBUM COR SERPL-MCNC: 8.9 MG/DL (ref 8.5–10.5)
CALCIUM SERPL-MCNC: 9.5 MG/DL (ref 8.5–10.5)
CHLORIDE SERPL-SCNC: 100 MMOL/L (ref 96–112)
CHOLEST SERPL-MCNC: 155 MG/DL (ref 100–199)
CO2 SERPL-SCNC: 25 MMOL/L (ref 20–33)
COLOR UR: YELLOW
CREAT SERPL-MCNC: 0.94 MG/DL (ref 0.5–1.4)
CREAT UR-MCNC: 123.7 MG/DL
EOSINOPHIL # BLD AUTO: 0.09 K/UL (ref 0–0.51)
EOSINOPHIL NFR BLD: 1.2 % (ref 0–6.9)
EPI CELLS #/AREA URNS HPF: NEGATIVE /HPF
ERYTHROCYTE [DISTWIDTH] IN BLOOD BY AUTOMATED COUNT: 39.8 FL (ref 35.9–50)
EST. AVERAGE GLUCOSE BLD GHB EST-MCNC: 163 MG/DL
FASTING STATUS PATIENT QL REPORTED: NORMAL
GFR SERPLBLD CREATININE-BSD FMLA CKD-EPI: 100 ML/MIN/1.73 M 2
GLOBULIN SER CALC-MCNC: 2.6 G/DL (ref 1.9–3.5)
GLUCOSE SERPL-MCNC: 112 MG/DL (ref 65–99)
GLUCOSE UR STRIP.AUTO-MCNC: NEGATIVE MG/DL
HBA1C MFR BLD: 7.3 % (ref 4–5.6)
HCT VFR BLD AUTO: 53.1 % (ref 42–52)
HDLC SERPL-MCNC: 32 MG/DL
HGB BLD-MCNC: 17.6 G/DL (ref 14–18)
HYALINE CASTS #/AREA URNS LPF: ABNORMAL /LPF
IMM GRANULOCYTES # BLD AUTO: 0.02 K/UL (ref 0–0.11)
IMM GRANULOCYTES NFR BLD AUTO: 0.3 % (ref 0–0.9)
KETONES UR STRIP.AUTO-MCNC: NEGATIVE MG/DL
LDLC SERPL CALC-MCNC: 99 MG/DL
LEUKOCYTE ESTERASE UR QL STRIP.AUTO: NEGATIVE
LYMPHOCYTES # BLD AUTO: 2.4 K/UL (ref 1–4.8)
LYMPHOCYTES NFR BLD: 32.8 % (ref 22–41)
MCH RBC QN AUTO: 27.9 PG (ref 27–33)
MCHC RBC AUTO-ENTMCNC: 33.1 G/DL (ref 32.3–36.5)
MCV RBC AUTO: 84.2 FL (ref 81.4–97.8)
MICRO URNS: ABNORMAL
MICROALBUMIN UR-MCNC: 19.7 MG/DL
MICROALBUMIN/CREAT UR: 159 MG/G (ref 0–30)
MONOCYTES # BLD AUTO: 0.62 K/UL (ref 0–0.85)
MONOCYTES NFR BLD AUTO: 8.5 % (ref 0–13.4)
NEUTROPHILS # BLD AUTO: 4.13 K/UL (ref 1.82–7.42)
NEUTROPHILS NFR BLD: 56.4 % (ref 44–72)
NITRITE UR QL STRIP.AUTO: NEGATIVE
NRBC # BLD AUTO: 0 K/UL
NRBC BLD-RTO: 0 /100 WBC (ref 0–0.2)
PH UR STRIP.AUTO: 5.5 [PH] (ref 5–8)
PLATELET # BLD AUTO: 207 K/UL (ref 164–446)
PMV BLD AUTO: 10.9 FL (ref 9–12.9)
POTASSIUM SERPL-SCNC: 4.1 MMOL/L (ref 3.6–5.5)
PROT SERPL-MCNC: 7.3 G/DL (ref 6–8.2)
PROT UR QL STRIP: 30 MG/DL
PSA SERPL-MCNC: 0.59 NG/ML (ref 0–4)
RBC # BLD AUTO: 6.31 M/UL (ref 4.7–6.1)
RBC # URNS HPF: ABNORMAL /HPF
RBC UR QL AUTO: NEGATIVE
SODIUM SERPL-SCNC: 137 MMOL/L (ref 135–145)
SP GR UR STRIP.AUTO: 1.02
TRIGL SERPL-MCNC: 120 MG/DL (ref 0–149)
TSH SERPL DL<=0.005 MIU/L-ACNC: 3.48 UIU/ML (ref 0.38–5.33)
UROBILINOGEN UR STRIP.AUTO-MCNC: 0.2 MG/DL
WBC # BLD AUTO: 7.3 K/UL (ref 4.8–10.8)
WBC #/AREA URNS HPF: ABNORMAL /HPF

## 2023-10-24 PROCEDURE — 99396 PREV VISIT EST AGE 40-64: CPT | Performed by: FAMILY MEDICINE

## 2023-10-24 PROCEDURE — 84153 ASSAY OF PSA TOTAL: CPT

## 2023-10-24 PROCEDURE — 82043 UR ALBUMIN QUANTITATIVE: CPT

## 2023-10-24 PROCEDURE — 3078F DIAST BP <80 MM HG: CPT | Performed by: FAMILY MEDICINE

## 2023-10-24 PROCEDURE — 36415 COLL VENOUS BLD VENIPUNCTURE: CPT

## 2023-10-24 PROCEDURE — 80053 COMPREHEN METABOLIC PANEL: CPT

## 2023-10-24 PROCEDURE — 83036 HEMOGLOBIN GLYCOSYLATED A1C: CPT

## 2023-10-24 PROCEDURE — 81001 URINALYSIS AUTO W/SCOPE: CPT

## 2023-10-24 PROCEDURE — 80061 LIPID PANEL: CPT

## 2023-10-24 PROCEDURE — 3074F SYST BP LT 130 MM HG: CPT | Performed by: FAMILY MEDICINE

## 2023-10-24 PROCEDURE — 85025 COMPLETE CBC W/AUTO DIFF WBC: CPT

## 2023-10-24 PROCEDURE — 84443 ASSAY THYROID STIM HORMONE: CPT

## 2023-10-24 PROCEDURE — 82570 ASSAY OF URINE CREATININE: CPT

## 2023-10-24 RX ORDER — SUCRALFATE 1 G/1
1 TABLET ORAL 2 TIMES DAILY
Qty: 180 TABLET | Refills: 3 | Status: SHIPPED | OUTPATIENT
Start: 2023-10-24

## 2023-10-24 RX ORDER — LEVOTHYROXINE SODIUM 88 UG/1
88 TABLET ORAL
Qty: 90 TABLET | Refills: 3 | Status: SHIPPED | OUTPATIENT
Start: 2023-10-24

## 2023-10-24 RX ORDER — SUCRALFATE 1 G/1
1 TABLET ORAL 2 TIMES DAILY
COMMUNITY
End: 2023-10-24 | Stop reason: SDUPTHER

## 2023-10-24 RX ORDER — METFORMIN HYDROCHLORIDE 500 MG/1
500 TABLET, EXTENDED RELEASE ORAL 2 TIMES DAILY
COMMUNITY
End: 2024-02-26 | Stop reason: SDUPTHER

## 2023-10-24 RX ORDER — LANSOPRAZOLE 30 MG/1
30 CAPSULE, DELAYED RELEASE ORAL DAILY
Qty: 90 CAPSULE | Refills: 3 | Status: SHIPPED | OUTPATIENT
Start: 2023-10-24

## 2023-10-24 ASSESSMENT — FIBROSIS 4 INDEX: FIB4 SCORE: 1.04

## 2023-10-24 ASSESSMENT — PATIENT HEALTH QUESTIONNAIRE - PHQ9: CLINICAL INTERPRETATION OF PHQ2 SCORE: 0

## 2023-10-24 NOTE — ASSESSMENT & PLAN NOTE
Patient is here today for his wellness exam and to reestablish care.  Last year after seeing me his insurance changed and we did not take that here so he had to get a different provider.  He saw them for a while and then they left and then he went to another provider who is also left.  Since he saw me he had been started on Ozempic and that did help with weight reduction and greatly improved his hemoglobin A1c down to 6.1%.  He has not taken it for about the last 2 to 3 months.  He is wondering if he needs to restart on it.  He is currently only on metformin for his diabetes.  We will order his labs to find out what his numbers are.  He is also been followed by GI and had several scopes done.  They last put him on sucralfate and that helped a great deal with his swallowing and GERD symptoms.  They also had told him that the Ozempic sometimes can exacerbate esophageal issues.

## 2023-10-24 NOTE — PROGRESS NOTES
"Subjective:     CC: Here to reestablish care and have his wellness exam.    HPI:   Giuseppe presents today with the following medical concerns:    Wellness examination  Patient is here today for his wellness exam and to reestablish care.  Last year after seeing me his insurance changed and we did not take that here so he had to get a different provider.  He saw them for a while and then they left and then he went to another provider who is also left.  Since he saw me he had been started on Ozempic and that did help with weight reduction and greatly improved his hemoglobin A1c down to 6.1%.  He has not taken it for about the last 2 to 3 months.  He is wondering if he needs to restart on it.  He is currently only on metformin for his diabetes.  We will order his labs to find out what his numbers are.  He is also been followed by GI and had several scopes done.  They last put him on sucralfate and that helped a great deal with his swallowing and GERD symptoms.  They also had told him that the Ozempic sometimes can exacerbate esophageal issues.    Past Medical History:   Diagnosis Date    Bowel habit changes 02/2021    constipation and diarrhea    Glaucoma     L eye    Heart burn     High cholesterol     Hypertension     Myocardial infarct (HCC)     Snoring     Tuberculosis 2008    no treatment, \"not active\"       Social History     Tobacco Use    Smoking status: Never    Smokeless tobacco: Never   Vaping Use    Vaping Use: Never used   Substance Use Topics    Alcohol use: Not Currently     Comment: occasional, rare    Drug use: No       Current Outpatient Medications Ordered in Epic   Medication Sig Dispense Refill    metFORMIN ER (GLUCOPHAGE XR) 500 MG TABLET SR 24 HR Take 500 mg by mouth 2 times a day.      levothyroxine (SYNTHROID) 88 MCG Tab Take 1 Tablet by mouth every morning on an empty stomach. Taking 88 MCG daily 90 Tablet 3    lansoprazole (PREVACID) 30 MG CAPSULE DELAYED RELEASE Take 1 Capsule by mouth every " [facility]. Pulmonary, Critical Care, and Sleep Medicine    Name: Phillip Mason MRN: [id number]   : 1943 Hospital: [address] [place] [address] Dr   Date: 10/6/2017  Admission Date: 10/4/2017     Chart and notes reviewed. Data reviewed. I have evaluated all findings. [x]I have reviewed the flowsheet and previous days notes. []The patient is unable to give any meaningful history or review of systems because the patient is:  []Intubated []Sedated   []Unresponsive      [x]The patient is critically ill on      []Mechanical ventilation []Pressors   []BiPAP [x]GI bleeding                 ROS:Pertinent items are noted in HPI. Events and notes from last 24 hours reviewed. Care plan discussed on multidisciplinary rounds.   Patient Active Problem List   Diagnosis Code    Hypertension I10    Diabetes ([facility] [address] E11.9    Hyperlipidemia E78.5    Gout M10.9    Pacemaker Z95.0    Vitamin D deficiency E55.9    CHF (congestive heart failure) (HCC) I50.9    BPH (benign prostatic hypertrophy) N40.0    Anemia D64.9    Atrial fibrillation (HCC) I48.91    GI bleed K92.2    Symptomatic anemia D64.9    Rectal bleed K62.5    Acute gastric ulcer with hemorrhage K25.0       Vital Signs:  Visit Vitals    /63    Pulse (!) 3    Temp 99.5 °F (37.5 °C)    Resp 15    Ht 5' 8\" (1.727 m)    Wt 96.7 kg (213 lb 3 oz)    SpO2 100%    BMI 32.41 kg/m2       O2 Device: Room air       Temp (24hrs), Av.5 °F (36.9 °C), Min:97.8 °F (36.6 °C), Max:99.5 °F (37.5 °C)       Intake/Output:   Last shift:         Last 3 shifts: 10/05 0701 - 10/06 1900  In: 890.3 [I.V.:580.3]  Out: 3025 [Urine:3025]    Intake/Output Summary (Last 24 hours) at 10/06/17 2217  Last data filed at 10/06/17 1600   Gross per 24 hour   Intake              300 ml   Output             1700 ml   Net            -1400 ml      Ventilator Settings:                Current Facility-Administered Medications   Medication Dose Route Frequency    "day. 90 Capsule 3    sucralfate (CARAFATE) 1 GM Tab Take 1 Tablet by mouth 2 times a day. 180 Tablet 3    aspirin EC (ECOTRIN) 81 MG Tablet Delayed Response Take 162 mg by mouth every morning. 2 tablets = 162 mg.      MILK THISTLE PO Take 1 Capsule by mouth every day.      vitamin D3 (CHOLECALCIFEROL) 1000 Unit (25 mcg) Tab Take 1,000 Units by mouth every day.      Multiple Vitamins-Minerals (MENS MULTIVITAMIN PO) Take 2 Capsules by mouth every day. [facility] brand.      atorvastatin (LIPITOR) 80 MG tablet Take 1 Tablet by mouth every evening. 90 Tablet 7    losartan (COZAAR) 50 MG Tab Take 1 Tablet by mouth every day. 90 Tablet 7    fexofenadine (ALLEGRA) 180 MG tablet Take 180 mg by mouth every day.       No current [facility]-ordered facility-administered medications on file.       Allergies:  Iodine, Lisinopril, Pineapple, Shellfish allergy, Biofreeze, Cat hair extract, and Latex    Health Maintenance: Completed    ROS:  Gen: no fevers/chills, decrease in weight from dieting and exercise  Eyes: no changes in vision  ENT: no sore throat, no hearing loss, no bloody nose  Pulm: no sob, no cough  CV: no chest pain, no palpitations  GI: no nausea/vomiting, no diarrhea  : no dysuria  MSk: no myalgias  Skin: no rash  Neuro: no headaches, no numbness/tingling  Heme/Lymph: no easy bruising      Objective:       Exam:  /74 (BP Location: Left arm, Patient Position: Sitting, BP Cuff Size: Large adult)   Pulse 66   Temp 36.1 °C (97 °F) (Temporal)   Ht 1.753 m (5' 9\")   Wt 110 kg (242 lb)   SpO2 97%   BMI 35.74 kg/m²  Body mass index is 35.74 kg/m².    Gen: Alert and oriented, No apparent distress.  Eyes:   Extraocular motion is intact.  No scleral icterus seen.  Ears:    Ear canals and TMs are clear.  Neck: Neck is supple without lymphadenopathy.  Lungs: Normal effort, CTA bilaterally, no wheezes, rhonchi, or rales  CV: Regular rate and rhythm. No murmurs, rubs, or gallops.  Abdomen: Soft, nontender, no organomegaly or " 0.45% sodium chloride infusion  100 mL/hr IntraVENous CONTINUOUS    pantoprazole (PROTONIX) 40 mg in sodium chloride 0.9 % 10 mL injection  40 mg IntraVENous Q12H    [START ON 10/7/2017] clarithromycin (BIAXIN) tablet 500 mg  500 mg Oral BID    amoxicillin (AMOXIL) capsule 1,000 mg  1,000 mg Oral Q12H    0.9% sodium chloride infusion  500 mL/hr IntraVENous CONTINUOUS    NOREPINephrine (LEVOPHED) 8,000 mcg in dextrose 5% 250 mL infusion  2-16 mcg/min IntraVENous TITRATE    insulin lispro (HUMALOG) injection   SubCUTAneous Q6H    allopurinol (ZYLOPRIM) tablet 300 mg  300 mg Oral DAILY    ezetimibe (ZETIA) tablet 10 mg  10 mg Oral DAILY    finasteride (PROSCAR) tablet 5 mg  5 mg Oral DAILY    docusate sodium (COLACE) capsule 100 mg  100 mg Oral BID       Telemetry:     Physical Exam:   General: in no apparent distress   HEENT: Normal, no orthostatic BP changes today   Neck: No abnormally enlarged lymph nodes. Chest: normal   Lungs: normal air entry, no dullness/ tenderness/ rash   Heart: Regular rate and rhythm   Abdomen: non distended, bowel sounds normoactive, tympanic, abdomen is soft without significant tenderness, masses, organomegaly or guarding   Extremity: negative   Neuro: alert   Skin: Skin color, texture, turgor normal. No rashes or lesions   DATA:  MAR reviewed and pertinent medications noted or modified as needed    Labs: 1 U PRBCs today with normal increment. Recent Labs      10/06/17   1730  10/06/17   1000  10/06/17   0440   10/05/17   1900  10/05/17   1030   WBC   --    --   12.1   --   14.0*  17.6*   HGB  10.5*  7.1*  7.0*   < >  7.4*  6.5*   HCT  31.2*  20.8*  20.4*   < >  21.4*  19.2*   PLT   --    --   104*   --   113*  148    < > = values in this interval not displayed.      Recent Labs      10/06/17   0440  10/05/17   1132  10/05/17   1030  10/04/17   1435   NA  151*  147*   --   142   K  4.2  4.6   --   4.5   CL  120*  117*   --   110*   CO2  21  19*   --   24   GLU  185*  279*   -- 212*   BUN  99*  107*   --   86*   CREA  1.88*  1.92*   --   1.56*   CA  8.2*  8.0*   --   8.5   ALB   --   2.5*   --   2.7*   SGOT   --   10*   --   13*   ALT   --   14*   --   18   INR   --    --   1.9*  1.5*     No results for input(s): PH, PCO2, PO2, HCO3, FIO2 in the last 72 hours. No results for input(s): FIO2I, IFO2, HCO3I, IHCO3, HCOPOC, PCO2I, PCOPOC, IPHI, PHI, PHPOC, PO2I, PO2POC in the last 72 hours. No lab exists for component: IPOC2  Imaging:  [x]                           I have personally reviewed the patients radiographs and reports    High complexity decision making was performed during this consultation and evaluation. [x]       Pt is at high risk for further organ failure and dysfunction. Critical care time spent  minutes with patient exclusive of procedures. IMPRESSION:   · Acute GI bleeding, s/p 7 U PRBCs and FFP, bleeding appears to have stopped.   EGD showed clean based shallow gastric ulcer which GI opined was unlikely to be the source of recurrent bleeding  · Acute renal failure on CKD  · Anemia of blood loss, no longer dizzy or orthostatic  · Coagulopathy of blood loss  · Diverticulosis  · Hyperglycemia  · AICD  · Pulmonary hypertension  · Cardiomyopathy, afib on chronic coumadin  · DM with recent HbA1c at goal  · Other problems per PMH list; inactive      PLAN:   Follow h/h daily, maintain Hb >7, PPIs  Appreciate GI input  No bleeding scan needed today  Re-start anticoagulation for chronic afib when GI approves; monitor levels closely  Hydrate for hypernatremia, liberalize PO intake         Crista Baum MD   10/6/2017 10:23 PM masses.  Normal bowel sounds.  Ext: No clubbing, cyanosis, edema.    Neuro: Cranial nerves II through VIII are grossly intact.  No lateralized signs are seen.  Gait is normal.      Labs: Reviewed and ordered    Assessment & Plan:     48 y.o. male with the following -     1. Diabetes mellitus with coincident hypertension (HCC)  This is a chronic problem.  Lab ordered.  He will be notified of the results.  If his hemoglobin A1c is back up then we would start him on Mounjaro if he would like rather than the Ozempic since he had side effects.  Or we could add on Amaryl to his metformin.  He states he cannot tolerate a higher dose of metformin because it bothers his stomach.  - HEMOGLOBIN A1C; Future  - MICROALBUMIN CREAT RATIO URINE; Future    2. Acquired hypothyroidism  This is a chronic problem.  Lab ordered.  Medication renewed.  - TSH WITH REFLEX TO FT4; Future  - levothyroxine (SYNTHROID) 88 MCG Tab; Take 1 Tablet by mouth every morning on an empty stomach. Taking 88 MCG daily  Dispense: 90 Tablet; Refill: 3    3. Wellness examination  Patient's wellness exam was done today.  He appears to be doing very well.  We encouraged him to continue with his diet and exercise.  Baseline labs ordered.  - Comp Metabolic Panel; Future  - Lipid Profile; Future  - PROSTATE SPECIFIC AG SCREENING; Future  - URINALYSIS,CULTURE IF INDICATED; Future  - CBC WITH DIFFERENTIAL; Future  - ESTIMATED GFR; Future    4. Gastroesophageal reflux disease  This is a chronic problem.  Medication renewed.  Records requested.  - lansoprazole (PREVACID) 30 MG CAPSULE DELAYED RELEASE; Take 1 Capsule by mouth every day.  Dispense: 90 Capsule; Refill: 3    5. Obesity (BMI 30-39.9)  This chronic problem.  Continue to work on weight reduction.  - Patient identified as having weight management issue.  Appropriate orders and counseling given.    Anticipatory guidance: Patient talked to about healthy lifestyle and weight reduction.  Also we talked about his  diabetes and maintaining that.  Return in about 1 year (around 10/24/2024) for annual exam.    Please note that this dictation was created using voice recognition software. I have made every reasonable attempt to correct obvious errors, but I expect that there are errors of grammar and possibly content that I did not discover before finalizing the note.

## 2023-10-24 NOTE — LETTER
[facility]  Manjeet Moyer III, M.D.  [address]  [facility] [address]  Fax: [phone]   Authorization for Release/Disclosure of   Protected Health Information   Name: SHELLY MACIAS : 1975 SSN: [id number]   Address: [address] [facility] [address] [facility] [address] Phone:    [phone] (home) [phone] (work)   I authorize the entity listed below to release/disclose the PHI below to:   [facility]/Manjeet Moyer III, M.D. and Manjeet Moyer III, M.D.   Provider or Entity Name:  [facility]   Address   City, [facility], Zip:               [address] [place] [address]   Phone:  [phone]      Fax:      [phone]        Reason for request: continuity of care   Information to be released:    [ X ] LAST COLONOSCOPY,  including any PATH REPORT and follow-up  [ X ] LAST FIT/COLOGUARD RESULT [  ] LAST DEXA  [  ] LAST MAMMOGRAM  [  ] LAST PAP  [  ] LAST LABS [  ] RETINA EXAM REPORT  [  ] IMMUNIZATION RECORDS  [  ] Release all info      [  ] Check here and initial the line next to each item to release ALL health information INCLUDING  _____ Care and treatment for drug and / or alcohol abuse  _____ HIV testing, infection status, or AIDS  _____ Genetic Testing    DATES OF SERVICE OR TIME PERIOD TO BE DISCLOSED: _____________  I understand and acknowledge that:  * This Authorization may be revoked at any time by you in writing, except if your health information has already been used or disclosed.  * Your health information that will be used or disclosed as a result of you signing this authorization could be re-disclosed by the recipient. If this occurs, your re-disclosed health information may no longer be protected by State or Federal laws.  * You may refuse to sign this Authorization. Your refusal will not affect your ability to obtain treatment.  * This Authorization becomes effective upon signing and will  on (date) __________.      If no date is indicated,  this Authorization will  one (1) year from the signature date.    Name: Giuseppe Swift    Signature:   Date:     10/24/2023       PLEASE FAX REQUESTED RECORDS BACK TO: [phone]

## 2023-10-24 NOTE — LETTER
[facility]  Manjeet Moyer III, M.D.  [address]  [facility] [address]  Fax: [phone]   Authorization for Release/Disclosure of   Protected Health Information   Name: SHELLY MACIAS : 1975 SSN: [id number]   Address: [address] [facility] [address] Phone:    [phone] (home) [phone] (work)   I authorize the entity listed below to release/disclose the PHI below to:   [facility]/Manjeet Moyer III, M.D. and Manjeet Moyer III, M.D.   Provider or Entity Name:  [facility]   Address   [facility], [facility], [place]            [address] [place] [address] Phone:  [phone]      Fax:       [phone]      Reason for request: continuity of care   Information to be released:    [ X ] LAST COLONOSCOPY,  including any PATH REPORT and follow-up  [ X ] LAST FIT/COLOGUARD RESULT [  ] LAST DEXA  [  ] LAST MAMMOGRAM  [  ] LAST PAP  [  ] LAST LABS [  ] RETINA EXAM REPORT  [  ] IMMUNIZATION RECORDS  [  ] Release all info      [  ] Check here and initial the line next to each item to release ALL health information INCLUDING  _____ Care and treatment for drug and / or alcohol abuse  _____ HIV testing, infection status, or AIDS  _____ Genetic Testing    DATES OF SERVICE OR TIME PERIOD TO BE DISCLOSED: _____________  I understand and acknowledge that:  * This Authorization may be revoked at any time by you in writing, except if your health information has already been used or disclosed.  * Your health information that will be used or disclosed as a result of you signing this authorization could be re-disclosed by the recipient. If this occurs, your re-disclosed health information may no longer be protected by State or Federal laws.  * You may refuse to sign this Authorization. Your refusal will not affect your ability to obtain treatment.  * This Authorization becomes effective upon signing and will  on (date) __________.      If no date is indicated, this  Authorization will  one (1) year from the signature date.    Name: Giuseppe Swift    Signature:   Date:     10/24/2023       PLEASE FAX REQUESTED RECORDS BACK TO: [phone]

## 2023-10-25 DIAGNOSIS — R80.9 DIABETES MELLITUS DUE TO UNDERLYING CONDITION WITH MICROALBUMINURIA, WITHOUT LONG-TERM CURRENT USE OF INSULIN (HCC): ICD-10-CM

## 2023-10-25 DIAGNOSIS — E08.29 DIABETES MELLITUS DUE TO UNDERLYING CONDITION WITH MICROALBUMINURIA, WITHOUT LONG-TERM CURRENT USE OF INSULIN (HCC): ICD-10-CM

## 2023-11-10 RX ORDER — GLIMEPIRIDE 4 MG/1
4 TABLET ORAL EVERY MORNING
Qty: 90 TABLET | Refills: 3 | Status: SHIPPED | OUTPATIENT
Start: 2023-11-10

## 2023-12-04 RX ORDER — EPINEPHRINE 0.3 MG/.3ML
INJECTION SUBCUTANEOUS
Qty: 2 EACH | Refills: 2 | Status: SHIPPED | OUTPATIENT
Start: 2023-12-04

## 2024-01-22 ENCOUNTER — HOSPITAL ENCOUNTER (OUTPATIENT)
Dept: LAB | Facility: MEDICAL CENTER | Age: 49
End: 2024-01-22
Attending: FAMILY MEDICINE
Payer: COMMERCIAL

## 2024-01-22 DIAGNOSIS — R80.9 DIABETES MELLITUS DUE TO UNDERLYING CONDITION WITH MICROALBUMINURIA, WITHOUT LONG-TERM CURRENT USE OF INSULIN (HCC): ICD-10-CM

## 2024-01-22 DIAGNOSIS — E08.29 DIABETES MELLITUS DUE TO UNDERLYING CONDITION WITH MICROALBUMINURIA, WITHOUT LONG-TERM CURRENT USE OF INSULIN (HCC): ICD-10-CM

## 2024-01-22 LAB
CREAT UR-MCNC: 42 MG/DL
EST. AVERAGE GLUCOSE BLD GHB EST-MCNC: 117 MG/DL
HBA1C MFR BLD: 5.7 % (ref 4–5.6)
MICROALBUMIN UR-MCNC: 5.1 MG/DL
MICROALBUMIN/CREAT UR: 121 MG/G (ref 0–30)

## 2024-01-22 PROCEDURE — 82043 UR ALBUMIN QUANTITATIVE: CPT

## 2024-01-22 PROCEDURE — 83036 HEMOGLOBIN GLYCOSYLATED A1C: CPT

## 2024-01-22 PROCEDURE — 82570 ASSAY OF URINE CREATININE: CPT

## 2024-01-22 PROCEDURE — 36415 COLL VENOUS BLD VENIPUNCTURE: CPT

## 2024-01-29 DIAGNOSIS — I10 DIABETES MELLITUS WITH COINCIDENT HYPERTENSION (HCC): ICD-10-CM

## 2024-01-29 DIAGNOSIS — E11.9 DIABETES MELLITUS WITH COINCIDENT HYPERTENSION (HCC): ICD-10-CM

## 2024-02-26 ENCOUNTER — PATIENT MESSAGE (OUTPATIENT)
Dept: MEDICAL GROUP | Facility: PHYSICIAN GROUP | Age: 49
End: 2024-02-26
Payer: COMMERCIAL

## 2024-02-26 RX ORDER — METFORMIN HYDROCHLORIDE 500 MG/1
500 TABLET, EXTENDED RELEASE ORAL 2 TIMES DAILY
Qty: 180 TABLET | Refills: 3 | Status: SHIPPED | OUTPATIENT
Start: 2024-02-26

## 2024-04-24 DIAGNOSIS — E66.9 OBESITY (BMI 30-39.9): ICD-10-CM

## 2024-05-03 ENCOUNTER — HOSPITAL ENCOUNTER (OUTPATIENT)
Dept: LAB | Facility: MEDICAL CENTER | Age: 49
End: 2024-05-03
Attending: FAMILY MEDICINE
Payer: COMMERCIAL

## 2024-05-03 DIAGNOSIS — I10 DIABETES MELLITUS WITH COINCIDENT HYPERTENSION (HCC): ICD-10-CM

## 2024-05-03 DIAGNOSIS — E66.9 OBESITY (BMI 30-39.9): ICD-10-CM

## 2024-05-03 DIAGNOSIS — E11.9 DIABETES MELLITUS WITH COINCIDENT HYPERTENSION (HCC): ICD-10-CM

## 2024-05-03 LAB
EST. AVERAGE GLUCOSE BLD GHB EST-MCNC: 128 MG/DL
HBA1C MFR BLD: 6.1 % (ref 4–5.6)
TESTOST SERPL-MCNC: 200 NG/DL (ref 175–781)
TSH SERPL DL<=0.005 MIU/L-ACNC: 3.89 UIU/ML (ref 0.38–5.33)

## 2024-05-04 LAB
CREAT UR-MCNC: 133.53 MG/DL
MICROALBUMIN UR-MCNC: 18.1 MG/DL
MICROALBUMIN/CREAT UR: 136 MG/G (ref 0–30)

## 2024-05-06 DIAGNOSIS — R79.89 LOW TESTOSTERONE IN MALE: ICD-10-CM

## 2024-05-07 ENCOUNTER — OFFICE VISIT (OUTPATIENT)
Dept: MEDICAL GROUP | Facility: PHYSICIAN GROUP | Age: 49
End: 2024-05-07
Payer: COMMERCIAL

## 2024-05-07 VITALS
HEIGHT: 69 IN | OXYGEN SATURATION: 97 % | DIASTOLIC BLOOD PRESSURE: 74 MMHG | WEIGHT: 251 LBS | SYSTOLIC BLOOD PRESSURE: 136 MMHG | HEART RATE: 68 BPM | BODY MASS INDEX: 37.18 KG/M2 | RESPIRATION RATE: 16 BRPM | TEMPERATURE: 97.9 F

## 2024-05-07 DIAGNOSIS — E78.00 PURE HYPERCHOLESTEROLEMIA: ICD-10-CM

## 2024-05-07 DIAGNOSIS — E11.29 TYPE 2 DIABETES MELLITUS WITH DIABETIC MICROALBUMINURIA, WITHOUT LONG-TERM CURRENT USE OF INSULIN (HCC): ICD-10-CM

## 2024-05-07 DIAGNOSIS — J30.1 SEASONAL ALLERGIC RHINITIS DUE TO POLLEN: ICD-10-CM

## 2024-05-07 DIAGNOSIS — E03.9 ACQUIRED HYPOTHYROIDISM: ICD-10-CM

## 2024-05-07 DIAGNOSIS — R80.9 TYPE 2 DIABETES MELLITUS WITH DIABETIC MICROALBUMINURIA, WITHOUT LONG-TERM CURRENT USE OF INSULIN (HCC): ICD-10-CM

## 2024-05-07 DIAGNOSIS — R79.89 LOW TESTOSTERONE IN MALE: ICD-10-CM

## 2024-05-07 PROCEDURE — 3078F DIAST BP <80 MM HG: CPT | Performed by: FAMILY MEDICINE

## 2024-05-07 PROCEDURE — 99213 OFFICE O/P EST LOW 20 MIN: CPT | Mod: 25 | Performed by: FAMILY MEDICINE

## 2024-05-07 PROCEDURE — 3075F SYST BP GE 130 - 139MM HG: CPT | Performed by: FAMILY MEDICINE

## 2024-05-07 RX ORDER — TRIAMCINOLONE ACETONIDE 40 MG/ML
40 INJECTION, SUSPENSION INTRA-ARTICULAR; INTRAMUSCULAR ONCE
Status: COMPLETED | OUTPATIENT
Start: 2024-05-07 | End: 2024-05-07

## 2024-05-07 RX ORDER — PRAVASTATIN SODIUM 20 MG
20 TABLET ORAL DAILY
COMMUNITY
Start: 2024-03-28

## 2024-05-07 RX ADMIN — TRIAMCINOLONE ACETONIDE 40 MG: 40 INJECTION, SUSPENSION INTRA-ARTICULAR; INTRAMUSCULAR at 16:55

## 2024-05-07 ASSESSMENT — PATIENT HEALTH QUESTIONNAIRE - PHQ9: CLINICAL INTERPRETATION OF PHQ2 SCORE: 0

## 2024-05-07 ASSESSMENT — FIBROSIS 4 INDEX: FIB4 SCORE: 1.07

## 2024-05-07 NOTE — ASSESSMENT & PLAN NOTE
This is a new problem.  Patient's testosterone level was low normal at 200.  He was here to discuss this.  He was told we need to recheck it to confirm.  When he had done before was 374.  He does say that he ordered some testosterone on his own last year and was doing injections twice a week and felt much stronger and better.  He states his feeling of fatigue and wellbeing were improved.  Also he is erectile issues improved.

## 2024-05-07 NOTE — ASSESSMENT & PLAN NOTE
This is a chronic problem.  Patient states been having a lot of trouble sinus congestion and discomfort.  A coworker looked in his left ear and saw a hearing against the eardrum and he was concerned that could be causing a problem.  They apparently tried irrigation it did not come out.  He does take antihistamines on a regular basis.  He has had Kenalog shots about 7 years ago for his allergies and would like to get 1 today.

## 2024-05-07 NOTE — ASSESSMENT & PLAN NOTE
This is a chronic problem.  Patient states has been having symptoms of bloating and gas that might be related to the metformin.  His hemoglobin A1c was still under good control at 6.1 although a little higher than his last 1.  I will have him stop his metformin completely and check a fasting blood sugar at his worksite in about 2 weeks.  He is to let me know what that shows.  If it goes above 1 40-1 60 then we will increase the glimepiride.  He also has some microalbumin present.  Will continue to monitor that and it probably may also be related to his losartan.  If we start to see the levels escalate then we will refer him to nephrology.

## 2024-05-07 NOTE — ASSESSMENT & PLAN NOTE
This is a chronic problem.  He is working with his cardiologist to get his LDL lower.  He is currently on a different statin.  He is due to get lab work for that soon.

## 2024-05-07 NOTE — ASSESSMENT & PLAN NOTE
This is a chronic problem.  I discussed with him that his TSH was this is within normal limits but we could adjust this dose higher.  For now we will work on his testosterone issue and see how that helps.

## 2024-05-07 NOTE — PROGRESS NOTES
Subjective:     CC: Here for several issues and to discuss his recent lab test.    HPI:   Giuseppe presents today with the following medical concerns:    Low testosterone in male  This is a new problem.  Patient's testosterone level was low normal at 200.  He was here to discuss this.  He was told we need to recheck it to confirm.  When he had done before was 374.  He does say that he ordered some testosterone on his own last year and was doing injections twice a week and felt much stronger and better.  He states his feeling of fatigue and wellbeing were improved.  Also he is erectile issues improved.    Acquired hypothyroidism  This is a chronic problem.  I discussed with him that his TSH was this is within normal limits but we could adjust this dose higher.  For now we will work on his testosterone issue and see how that helps.    Type 2 diabetes mellitus with diabetic microalbuminuria, without long-term current use of insulin (HCC)  This is a chronic problem.  Patient states has been having symptoms of bloating and gas that might be related to the metformin.  His hemoglobin A1c was still under good control at 6.1 although a little higher than his last 1.  I will have him stop his metformin completely and check a fasting blood sugar at his worksite in about 2 weeks.  He is to let me know what that shows.  If it goes above 1 40-1 60 then we will increase the glimepiride.  He also has some microalbumin present.  Will continue to monitor that and it probably may also be related to his losartan.  If we start to see the levels escalate then we will refer him to nephrology.    Seasonal allergic rhinitis due to pollen  This is a chronic problem.  Patient states been having a lot of trouble sinus congestion and discomfort.  A coworker looked in his left ear and saw a hearing against the eardrum and he was concerned that could be causing a problem.  They apparently tried irrigation it did not come out.  He does take  "antihistamines on a regular basis.  He has had Kenalog shots about 7 years ago for his allergies and would like to get 1 today.    Pure hypercholesterolemia  This is a chronic problem.  He is working with his cardiologist to get his LDL lower.  He is currently on a different statin.  He is due to get lab work for that soon.    Past Medical History:   Diagnosis Date    Bowel habit changes 02/2021    constipation and diarrhea    Glaucoma     L eye    Heart burn     High cholesterol     Hypertension     Myocardial infarct (HCC)     Snoring     Tuberculosis 2008    no treatment, \"not active\"       Social History     Tobacco Use    Smoking status: Never    Smokeless tobacco: Never   Vaping Use    Vaping Use: Never used   Substance Use Topics    Alcohol use: Not Currently     Comment: occasional, rare    Drug use: No       Current Outpatient Medications Ordered in Epic   Medication Sig Dispense Refill    pravastatin (PRAVACHOL) 20 MG Tab Take 20 mg by mouth every day.      metFORMIN ER (GLUCOPHAGE XR) 500 MG TABLET SR 24 HR Take 1 Tablet by mouth 2 times a day. 180 Tablet 3    EPINEPHrine (EPIPEN) 0.3 MG/0.3ML Solution Auto-injector solution for injection Inject 0.3 mL into the thigh one time for 1 dose 2 Each 2    glimepiride (AMARYL) 4 MG Tab Take 1 Tablet by mouth every morning. 90 Tablet 3    levothyroxine (SYNTHROID) 88 MCG Tab Take 1 Tablet by mouth every morning on an empty stomach. Taking 88 MCG daily 90 Tablet 3    lansoprazole (PREVACID) 30 MG CAPSULE DELAYED RELEASE Take 1 Capsule by mouth every day. 90 Capsule 3    sucralfate (CARAFATE) 1 GM Tab Take 1 Tablet by mouth 2 times a day. 180 Tablet 3    aspirin EC (ECOTRIN) 81 MG Tablet Delayed Response Take 162 mg by mouth every morning. 2 tablets = 162 mg.      vitamin D3 (CHOLECALCIFEROL) 1000 Unit (25 mcg) Tab Take 1,000 Units by mouth every day.      Multiple Vitamins-Minerals (MENS MULTIVITAMIN PO) Take 2 Capsules by mouth every day. [facility] brand.      losartan " "(COZAAR) 50 MG Tab Take 1 Tablet by mouth every day. 90 Tablet 7    fexofenadine (ALLEGRA) 180 MG tablet Take 180 mg by mouth every day.       No current Epic-ordered facility-administered medications on file.       Allergies:  Iodine, Lisinopril, Pineapple, Shellfish allergy, Biofreeze, Cat hair extract, Latex, and Mushroom extract complex    Health Maintenance: Completed    ROS:  Gen: no fevers/chills, no changes in weight  Eyes: no changes in vision  ENT: no sore throat, no hearing loss, no bloody nose  Pulm: no sob, no cough  CV: no chest pain, no palpitations  GI: no nausea/vomiting, no diarrhea  : no dysuria  MSk: no myalgias  Skin: no rash  Neuro: no headaches, no numbness/tingling  Heme/Lymph: no easy bruising      Objective:       Exam:  /74 (BP Location: Left arm, Patient Position: Sitting, BP Cuff Size: Large adult)   Pulse 68   Temp 36.6 °C (97.9 °F) (Temporal)   Resp 16   Ht 1.753 m (5' 9\")   Wt 114 kg (251 lb)   SpO2 97%   BMI 37.07 kg/m²  Body mass index is 37.07 kg/m².    Gen: Alert and oriented, No apparent distress.  Ears:    There is a dark hair around the periphery of the left TM.  The TM itself appears normal and the ear canal is clear.  Lungs: Normal effort,   Ext: No clubbing, cyanosis, edema.      Labs: Lab results reviewed with patient    Assessment & Plan:     48 y.o. male with the following -     1. Seasonal allergic rhinitis due to pollen  This is a chronic problem.  Kenalog shot given today.    2. Acquired hypothyroidism  This is a chronic problem.  Will monitor him for now.  We may try increasing his thyroid dose at a future date.    3. Low testosterone in male  This is a new issue.  He was told to recheck his testosterone and if we have too low values we could start him on treatment.  He wants to do the shots instead of topical.  I told him we would likely start at 200 mg every 2 weeks.  And then follow his levels.    4. Pure hypercholesterolemia  This is a chronic " problem.  We discussed statins as a way to treat high cholesterol.  Continue follow-up with his cardiologist.    5. Type 2 diabetes mellitus with diabetic microalbuminuria, without long-term current use of insulin (HCC)  This is a chronic problem.  Will stop his metformin and monitor him on his glimepiride.  And then we will continue to follow his microalbumin.      Return in about 6 months (around 11/7/2024) for annual exam.    Please note that this dictation was created using voice recognition software. I have made every reasonable attempt to correct obvious errors, but I expect that there are errors of grammar and possibly content that I did not discover before finalizing the note.

## 2024-05-10 ENCOUNTER — HOSPITAL ENCOUNTER (OUTPATIENT)
Dept: LAB | Facility: MEDICAL CENTER | Age: 49
End: 2024-05-10
Attending: FAMILY MEDICINE
Payer: COMMERCIAL

## 2024-05-10 DIAGNOSIS — R79.89 LOW TESTOSTERONE IN MALE: ICD-10-CM

## 2024-05-10 LAB — TESTOST SERPL-MCNC: 111 NG/DL (ref 175–781)

## 2024-05-11 DIAGNOSIS — R79.89 LOW TESTOSTERONE IN MALE: ICD-10-CM

## 2024-05-17 ENCOUNTER — HOSPITAL ENCOUNTER (OUTPATIENT)
Dept: LAB | Facility: MEDICAL CENTER | Age: 49
End: 2024-05-17
Attending: INTERNAL MEDICINE
Payer: COMMERCIAL

## 2024-05-17 ENCOUNTER — HOSPITAL ENCOUNTER (OUTPATIENT)
Dept: LAB | Facility: MEDICAL CENTER | Age: 49
End: 2024-05-17
Attending: FAMILY MEDICINE
Payer: COMMERCIAL

## 2024-05-17 DIAGNOSIS — R79.89 LOW TESTOSTERONE IN MALE: ICD-10-CM

## 2024-05-17 LAB
ALBUMIN SERPL BCP-MCNC: 4.3 G/DL (ref 3.2–4.9)
ALBUMIN/GLOB SERPL: 1.8 G/DL
ALP SERPL-CCNC: 78 U/L (ref 30–99)
ALT SERPL-CCNC: 34 U/L (ref 2–50)
ANION GAP SERPL CALC-SCNC: 11 MMOL/L (ref 7–16)
AST SERPL-CCNC: 22 U/L (ref 12–45)
BILIRUB SERPL-MCNC: 0.4 MG/DL (ref 0.1–1.5)
BUN SERPL-MCNC: 18 MG/DL (ref 8–22)
CALCIUM ALBUM COR SERPL-MCNC: 9.2 MG/DL (ref 8.5–10.5)
CALCIUM SERPL-MCNC: 9.4 MG/DL (ref 8.5–10.5)
CHLORIDE SERPL-SCNC: 101 MMOL/L (ref 96–112)
CHOLEST SERPL-MCNC: 179 MG/DL (ref 100–199)
CO2 SERPL-SCNC: 27 MMOL/L (ref 20–33)
CREAT SERPL-MCNC: 1.1 MG/DL (ref 0.5–1.4)
FSH SERPL-ACNC: 3.3 MIU/ML (ref 1.5–12.4)
GFR SERPLBLD CREATININE-BSD FMLA CKD-EPI: 82 ML/MIN/1.73 M 2
GLOBULIN SER CALC-MCNC: 2.4 G/DL (ref 1.9–3.5)
GLUCOSE SERPL-MCNC: 117 MG/DL (ref 65–99)
HDLC SERPL-MCNC: 41 MG/DL
LDLC SERPL CALC-MCNC: 121 MG/DL
LH SERPL-ACNC: 2.3 IU/L (ref 1.7–8.6)
POTASSIUM SERPL-SCNC: 4.6 MMOL/L (ref 3.6–5.5)
PROLACTIN SERPL-MCNC: 13 NG/ML (ref 2.1–17.7)
PROT SERPL-MCNC: 6.7 G/DL (ref 6–8.2)
SODIUM SERPL-SCNC: 139 MMOL/L (ref 135–145)
TRIGL SERPL-MCNC: 86 MG/DL (ref 0–149)

## 2024-05-20 ENCOUNTER — DOCUMENTATION (OUTPATIENT)
Dept: MEDICAL GROUP | Facility: PHYSICIAN GROUP | Age: 49
End: 2024-05-20
Payer: COMMERCIAL

## 2024-05-20 DIAGNOSIS — R79.89 LOW TESTOSTERONE IN MALE: ICD-10-CM

## 2024-05-20 RX ORDER — TESTOSTERONE CYPIONATE 200 MG/ML
200 INJECTION, SOLUTION INTRAMUSCULAR
Qty: 6 ML | Refills: 0 | Status: SHIPPED | OUTPATIENT
Start: 2024-05-20 | End: 2024-08-12

## 2024-05-20 NOTE — PROGRESS NOTES
FINAL PRIOR AUTHORIZATION STATUS:    1.  Name of Medication & Dose: testosterone cypionate (DEPO-TESTOSTERONE) 200 MG/ML injection      2. Prior Auth Status: Approved through 5/20/2024 to 5/20/2025     3. Action Taken: Pharmacy Notified: yes Patient Notified: yes    SHELLY DUGAN (Dennis: [id number])  PA Case ID #: [id number]  Rx #: [id number]    Approval document scanned into media manager

## 2024-05-22 RX ORDER — SYRINGE W-NEEDLE,DISPOSAB,3 ML 25GX5/8"
SYRINGE, EMPTY DISPOSABLE MISCELLANEOUS
Qty: 6 EACH | Refills: 3 | Status: SHIPPED | OUTPATIENT
Start: 2024-05-22 | End: 2024-05-28 | Stop reason: SDUPTHER

## 2024-05-28 RX ORDER — SYRINGE W-NEEDLE,DISPOSAB,3 ML 25GX5/8"
SYRINGE, EMPTY DISPOSABLE MISCELLANEOUS
Qty: 6 EACH | Refills: 3 | Status: SHIPPED | OUTPATIENT
Start: 2024-05-28

## 2024-06-18 ENCOUNTER — HOSPITAL ENCOUNTER (OUTPATIENT)
Dept: LAB | Facility: MEDICAL CENTER | Age: 49
End: 2024-06-18
Attending: FAMILY MEDICINE
Payer: COMMERCIAL

## 2024-06-18 DIAGNOSIS — R79.89 LOW TESTOSTERONE IN MALE: ICD-10-CM

## 2024-06-18 LAB
BASOPHILS # BLD AUTO: 0.7 % (ref 0–1.8)
BASOPHILS # BLD: 0.07 K/UL (ref 0–0.12)
EOSINOPHIL # BLD AUTO: 0.09 K/UL (ref 0–0.51)
EOSINOPHIL NFR BLD: 0.9 % (ref 0–6.9)
ERYTHROCYTE [DISTWIDTH] IN BLOOD BY AUTOMATED COUNT: 43.5 FL (ref 35.9–50)
HCT VFR BLD AUTO: 52.4 % (ref 42–52)
HGB BLD-MCNC: 17 G/DL (ref 14–18)
IMM GRANULOCYTES # BLD AUTO: 0.04 K/UL (ref 0–0.11)
IMM GRANULOCYTES NFR BLD AUTO: 0.4 % (ref 0–0.9)
LYMPHOCYTES # BLD AUTO: 2.47 K/UL (ref 1–4.8)
LYMPHOCYTES NFR BLD: 25.3 % (ref 22–41)
MCH RBC QN AUTO: 29.4 PG (ref 27–33)
MCHC RBC AUTO-ENTMCNC: 32.4 G/DL (ref 32.3–36.5)
MCV RBC AUTO: 90.7 FL (ref 81.4–97.8)
MONOCYTES # BLD AUTO: 0.74 K/UL (ref 0–0.85)
MONOCYTES NFR BLD AUTO: 7.6 % (ref 0–13.4)
NEUTROPHILS # BLD AUTO: 6.37 K/UL (ref 1.82–7.42)
NEUTROPHILS NFR BLD: 65.1 % (ref 44–72)
NRBC # BLD AUTO: 0 K/UL
NRBC BLD-RTO: 0 /100 WBC (ref 0–0.2)
PLATELET # BLD AUTO: 233 K/UL (ref 164–446)
PMV BLD AUTO: 10.7 FL (ref 9–12.9)
RBC # BLD AUTO: 5.78 M/UL (ref 4.7–6.1)
TESTOST SERPL-MCNC: 207 NG/DL (ref 175–781)
WBC # BLD AUTO: 9.8 K/UL (ref 4.8–10.8)

## 2024-06-18 PROCEDURE — 36415 COLL VENOUS BLD VENIPUNCTURE: CPT

## 2024-06-18 PROCEDURE — 84403 ASSAY OF TOTAL TESTOSTERONE: CPT

## 2024-06-18 PROCEDURE — 85025 COMPLETE CBC W/AUTO DIFF WBC: CPT

## 2024-06-19 DIAGNOSIS — R79.89 LOW TESTOSTERONE IN MALE: ICD-10-CM

## 2024-07-12 DIAGNOSIS — R79.89 LOW TESTOSTERONE IN MALE: ICD-10-CM

## 2024-07-12 RX ORDER — TESTOSTERONE CYPIONATE 200 MG/ML
200 INJECTION, SOLUTION INTRAMUSCULAR
Qty: 6 ML | Refills: 0 | Status: SHIPPED | OUTPATIENT
Start: 2024-07-12 | End: 2024-07-17 | Stop reason: SDUPTHER

## 2024-07-16 ENCOUNTER — HOSPITAL ENCOUNTER (OUTPATIENT)
Dept: LAB | Facility: MEDICAL CENTER | Age: 49
End: 2024-07-16
Attending: FAMILY MEDICINE
Payer: COMMERCIAL

## 2024-07-16 DIAGNOSIS — R79.89 LOW TESTOSTERONE IN MALE: ICD-10-CM

## 2024-07-16 LAB
BASOPHILS # BLD AUTO: 0.6 % (ref 0–1.8)
BASOPHILS # BLD: 0.06 K/UL (ref 0–0.12)
EOSINOPHIL # BLD AUTO: 0.1 K/UL (ref 0–0.51)
EOSINOPHIL NFR BLD: 1 % (ref 0–6.9)
ERYTHROCYTE [DISTWIDTH] IN BLOOD BY AUTOMATED COUNT: 41.7 FL (ref 35.9–50)
HCT VFR BLD AUTO: 54.3 % (ref 42–52)
HGB BLD-MCNC: 17.5 G/DL (ref 14–18)
IMM GRANULOCYTES # BLD AUTO: 0.04 K/UL (ref 0–0.11)
IMM GRANULOCYTES NFR BLD AUTO: 0.4 % (ref 0–0.9)
LYMPHOCYTES # BLD AUTO: 2.22 K/UL (ref 1–4.8)
LYMPHOCYTES NFR BLD: 23 % (ref 22–41)
MCH RBC QN AUTO: 29.3 PG (ref 27–33)
MCHC RBC AUTO-ENTMCNC: 32.2 G/DL (ref 32.3–36.5)
MCV RBC AUTO: 90.8 FL (ref 81.4–97.8)
MONOCYTES # BLD AUTO: 0.79 K/UL (ref 0–0.85)
MONOCYTES NFR BLD AUTO: 8.2 % (ref 0–13.4)
NEUTROPHILS # BLD AUTO: 6.46 K/UL (ref 1.82–7.42)
NEUTROPHILS NFR BLD: 66.8 % (ref 44–72)
NRBC # BLD AUTO: 0 K/UL
NRBC BLD-RTO: 0 /100 WBC (ref 0–0.2)
PLATELET # BLD AUTO: 201 K/UL (ref 164–446)
PMV BLD AUTO: 10.6 FL (ref 9–12.9)
RBC # BLD AUTO: 5.98 M/UL (ref 4.7–6.1)
TESTOST SERPL-MCNC: 1288 NG/DL (ref 175–781)
WBC # BLD AUTO: 9.7 K/UL (ref 4.8–10.8)

## 2024-07-16 PROCEDURE — 84403 ASSAY OF TOTAL TESTOSTERONE: CPT

## 2024-07-16 PROCEDURE — 36415 COLL VENOUS BLD VENIPUNCTURE: CPT

## 2024-07-16 PROCEDURE — 85025 COMPLETE CBC W/AUTO DIFF WBC: CPT

## 2024-07-17 DIAGNOSIS — R79.89 LOW TESTOSTERONE IN MALE: ICD-10-CM

## 2024-07-17 RX ORDER — TESTOSTERONE CYPIONATE 200 MG/ML
100 INJECTION, SOLUTION INTRAMUSCULAR
Qty: 3 ML | Refills: 1 | Status: SHIPPED | OUTPATIENT
Start: 2024-07-17 | End: 2024-07-17

## 2024-07-17 RX ORDER — TESTOSTERONE CYPIONATE 200 MG/ML
INJECTION, SOLUTION INTRAMUSCULAR
Qty: 6 ML | Refills: 1 | Status: SHIPPED | OUTPATIENT
Start: 2024-07-17 | End: 2024-10-15

## 2024-07-17 RX ORDER — TESTOSTERONE CYPIONATE 200 MG/ML
INJECTION, SOLUTION INTRAMUSCULAR
Qty: 6 ML | Refills: 1 | Status: SHIPPED | OUTPATIENT
Start: 2024-07-17 | End: 2024-07-17 | Stop reason: SDUPTHER

## 2024-08-07 DIAGNOSIS — R79.89 LOW TESTOSTERONE IN MALE: ICD-10-CM

## 2024-08-07 RX ORDER — TESTOSTERONE CYPIONATE 200 MG/ML
INJECTION, SOLUTION INTRAMUSCULAR
Qty: 6 ML | Refills: 1 | Status: SHIPPED | OUTPATIENT
Start: 2024-08-07 | End: 2024-11-05

## 2024-09-06 DIAGNOSIS — E03.9 ACQUIRED HYPOTHYROIDISM: ICD-10-CM

## 2024-09-06 RX ORDER — LEVOTHYROXINE SODIUM 88 UG/1
88 TABLET ORAL
Qty: 90 TABLET | Refills: 3 | Status: SHIPPED | OUTPATIENT
Start: 2024-09-06

## 2024-09-06 NOTE — TELEPHONE ENCOUNTER
Received request via: Pharmacy    Was the patient seen in the last year in this department? Yes    Does the patient have an active prescription (recently filled or refills available) for medication(s) requested? No    Pharmacy Name: [facility] [address] [place], [place] [address] [facility]     Does the patient have shelter Plus and need 100-day supply? (This applies to ALL medications) Patient does not have SCP

## 2024-10-21 ENCOUNTER — HOSPITAL ENCOUNTER (OUTPATIENT)
Dept: LAB | Facility: MEDICAL CENTER | Age: 49
End: 2024-10-21
Attending: FAMILY MEDICINE
Payer: COMMERCIAL

## 2024-10-21 ENCOUNTER — PATIENT MESSAGE (OUTPATIENT)
Dept: MEDICAL GROUP | Facility: PHYSICIAN GROUP | Age: 49
End: 2024-10-21
Payer: COMMERCIAL

## 2024-10-21 DIAGNOSIS — E03.9 ACQUIRED HYPOTHYROIDISM: ICD-10-CM

## 2024-10-21 DIAGNOSIS — R79.89 LOW TESTOSTERONE IN MALE: ICD-10-CM

## 2024-10-21 DIAGNOSIS — R80.9 TYPE 2 DIABETES MELLITUS WITH DIABETIC MICROALBUMINURIA, WITHOUT LONG-TERM CURRENT USE OF INSULIN (HCC): ICD-10-CM

## 2024-10-21 DIAGNOSIS — E11.29 TYPE 2 DIABETES MELLITUS WITH DIABETIC MICROALBUMINURIA, WITHOUT LONG-TERM CURRENT USE OF INSULIN (HCC): ICD-10-CM

## 2024-10-21 LAB
BASOPHILS # BLD AUTO: 0.7 % (ref 0–1.8)
BASOPHILS # BLD: 0.08 K/UL (ref 0–0.12)
EOSINOPHIL # BLD AUTO: 0.1 K/UL (ref 0–0.51)
EOSINOPHIL NFR BLD: 0.9 % (ref 0–6.9)
ERYTHROCYTE [DISTWIDTH] IN BLOOD BY AUTOMATED COUNT: 42.8 FL (ref 35.9–50)
HCT VFR BLD AUTO: 57.6 % (ref 42–52)
HGB BLD-MCNC: 18.7 G/DL (ref 14–18)
IMM GRANULOCYTES # BLD AUTO: 0.03 K/UL (ref 0–0.11)
IMM GRANULOCYTES NFR BLD AUTO: 0.3 % (ref 0–0.9)
LYMPHOCYTES # BLD AUTO: 3.12 K/UL (ref 1–4.8)
LYMPHOCYTES NFR BLD: 26.6 % (ref 22–41)
MCH RBC QN AUTO: 27.9 PG (ref 27–33)
MCHC RBC AUTO-ENTMCNC: 32.5 G/DL (ref 32.3–36.5)
MCV RBC AUTO: 86 FL (ref 81.4–97.8)
MONOCYTES # BLD AUTO: 0.86 K/UL (ref 0–0.85)
MONOCYTES NFR BLD AUTO: 7.3 % (ref 0–13.4)
NEUTROPHILS # BLD AUTO: 7.52 K/UL (ref 1.82–7.42)
NEUTROPHILS NFR BLD: 64.2 % (ref 44–72)
NRBC # BLD AUTO: 0 K/UL
NRBC BLD-RTO: 0 /100 WBC (ref 0–0.2)
PLATELET # BLD AUTO: 208 K/UL (ref 164–446)
PMV BLD AUTO: 10.9 FL (ref 9–12.9)
RBC # BLD AUTO: 6.7 M/UL (ref 4.7–6.1)
TESTOST SERPL-MCNC: 598 NG/DL (ref 175–781)
WBC # BLD AUTO: 11.7 K/UL (ref 4.8–10.8)

## 2024-10-21 PROCEDURE — 85025 COMPLETE CBC W/AUTO DIFF WBC: CPT

## 2024-10-21 PROCEDURE — 36415 COLL VENOUS BLD VENIPUNCTURE: CPT

## 2024-10-21 PROCEDURE — 84403 ASSAY OF TOTAL TESTOSTERONE: CPT

## 2024-10-21 PROCEDURE — 84443 ASSAY THYROID STIM HORMONE: CPT

## 2024-10-21 PROCEDURE — 83036 HEMOGLOBIN GLYCOSYLATED A1C: CPT

## 2024-10-22 DIAGNOSIS — R89.9 ABNORMAL LABORATORY TEST: ICD-10-CM

## 2024-10-23 ENCOUNTER — OFFICE VISIT (OUTPATIENT)
Dept: MEDICAL GROUP | Facility: PHYSICIAN GROUP | Age: 49
End: 2024-10-23
Payer: COMMERCIAL

## 2024-10-23 VITALS
DIASTOLIC BLOOD PRESSURE: 74 MMHG | TEMPERATURE: 97.9 F | WEIGHT: 250 LBS | RESPIRATION RATE: 16 BRPM | HEIGHT: 69 IN | HEART RATE: 70 BPM | OXYGEN SATURATION: 96 % | BODY MASS INDEX: 37.03 KG/M2 | SYSTOLIC BLOOD PRESSURE: 126 MMHG

## 2024-10-23 DIAGNOSIS — R80.9 TYPE 2 DIABETES MELLITUS WITH DIABETIC MICROALBUMINURIA, WITHOUT LONG-TERM CURRENT USE OF INSULIN (HCC): ICD-10-CM

## 2024-10-23 DIAGNOSIS — J30.1 SEASONAL ALLERGIC RHINITIS DUE TO POLLEN: ICD-10-CM

## 2024-10-23 DIAGNOSIS — R79.89 LOW TESTOSTERONE IN MALE: ICD-10-CM

## 2024-10-23 DIAGNOSIS — E11.29 TYPE 2 DIABETES MELLITUS WITH DIABETIC MICROALBUMINURIA, WITHOUT LONG-TERM CURRENT USE OF INSULIN (HCC): ICD-10-CM

## 2024-10-23 DIAGNOSIS — E66.9 OBESITY (BMI 30-39.9): ICD-10-CM

## 2024-10-23 DIAGNOSIS — E78.00 PURE HYPERCHOLESTEROLEMIA: ICD-10-CM

## 2024-10-23 LAB
EST. AVERAGE GLUCOSE BLD GHB EST-MCNC: 171 MG/DL
HBA1C MFR BLD: 7.6 % (ref 4–5.6)
TSH SERPL DL<=0.005 MIU/L-ACNC: 7.58 UIU/ML (ref 0.38–5.33)

## 2024-10-23 PROCEDURE — 3074F SYST BP LT 130 MM HG: CPT | Performed by: FAMILY MEDICINE

## 2024-10-23 PROCEDURE — 99213 OFFICE O/P EST LOW 20 MIN: CPT | Performed by: FAMILY MEDICINE

## 2024-10-23 PROCEDURE — 3078F DIAST BP <80 MM HG: CPT | Performed by: FAMILY MEDICINE

## 2024-10-23 RX ORDER — EZETIMIBE 10 MG/1
10 TABLET ORAL DAILY
COMMUNITY
Start: 2024-08-15

## 2024-10-23 RX ORDER — ROSUVASTATIN CALCIUM 40 MG/1
40 TABLET, COATED ORAL DAILY
COMMUNITY
Start: 2024-08-19

## 2024-10-23 ASSESSMENT — FIBROSIS 4 INDEX: FIB4 SCORE: 0.89

## 2024-10-28 DIAGNOSIS — E11.29 TYPE 2 DIABETES MELLITUS WITH DIABETIC MICROALBUMINURIA, WITHOUT LONG-TERM CURRENT USE OF INSULIN (HCC): ICD-10-CM

## 2024-10-28 DIAGNOSIS — R80.9 TYPE 2 DIABETES MELLITUS WITH DIABETIC MICROALBUMINURIA, WITHOUT LONG-TERM CURRENT USE OF INSULIN (HCC): ICD-10-CM

## 2024-10-28 DIAGNOSIS — E03.9 ACQUIRED HYPOTHYROIDISM: ICD-10-CM

## 2024-10-28 RX ORDER — LEVOTHYROXINE SODIUM 100 UG/1
100 TABLET ORAL
Qty: 30 TABLET | Refills: 1 | Status: SHIPPED | OUTPATIENT
Start: 2024-10-28

## 2024-11-04 ENCOUNTER — OFFICE VISIT (OUTPATIENT)
Dept: CARDIOLOGY | Facility: MEDICAL CENTER | Age: 49
End: 2024-11-04
Attending: INTERNAL MEDICINE
Payer: COMMERCIAL

## 2024-11-04 VITALS
OXYGEN SATURATION: 95 % | BODY MASS INDEX: 37.18 KG/M2 | HEART RATE: 70 BPM | HEIGHT: 69 IN | SYSTOLIC BLOOD PRESSURE: 150 MMHG | WEIGHT: 251 LBS | RESPIRATION RATE: 18 BRPM | DIASTOLIC BLOOD PRESSURE: 84 MMHG

## 2024-11-04 DIAGNOSIS — R79.89 LOW TESTOSTERONE IN MALE: ICD-10-CM

## 2024-11-04 DIAGNOSIS — I10 PRIMARY HYPERTENSION: ICD-10-CM

## 2024-11-04 DIAGNOSIS — E11.29 TYPE 2 DIABETES MELLITUS WITH DIABETIC MICROALBUMINURIA, WITHOUT LONG-TERM CURRENT USE OF INSULIN (HCC): ICD-10-CM

## 2024-11-04 DIAGNOSIS — R53.82 CHRONIC FATIGUE: ICD-10-CM

## 2024-11-04 DIAGNOSIS — Z78.9 STATIN INTOLERANCE: ICD-10-CM

## 2024-11-04 DIAGNOSIS — R80.9 TYPE 2 DIABETES MELLITUS WITH DIABETIC MICROALBUMINURIA, WITHOUT LONG-TERM CURRENT USE OF INSULIN (HCC): ICD-10-CM

## 2024-11-04 DIAGNOSIS — I25.10 CORONARY ARTERY DISEASE INVOLVING NATIVE CORONARY ARTERY OF NATIVE HEART WITHOUT ANGINA PECTORIS: ICD-10-CM

## 2024-11-04 DIAGNOSIS — M79.10 MYALGIA: ICD-10-CM

## 2024-11-04 DIAGNOSIS — R07.89 OTHER CHEST PAIN: ICD-10-CM

## 2024-11-04 DIAGNOSIS — E78.00 PURE HYPERCHOLESTEROLEMIA: ICD-10-CM

## 2024-11-04 DIAGNOSIS — Z88.8 ALLERGY TO IODINE: ICD-10-CM

## 2024-11-04 LAB — EKG IMPRESSION: NORMAL

## 2024-11-04 PROCEDURE — 3077F SYST BP >= 140 MM HG: CPT | Performed by: INTERNAL MEDICINE

## 2024-11-04 PROCEDURE — 93005 ELECTROCARDIOGRAM TRACING: CPT | Performed by: INTERNAL MEDICINE

## 2024-11-04 PROCEDURE — 99213 OFFICE O/P EST LOW 20 MIN: CPT | Performed by: INTERNAL MEDICINE

## 2024-11-04 PROCEDURE — 93010 ELECTROCARDIOGRAM REPORT: CPT | Performed by: INTERNAL MEDICINE

## 2024-11-04 PROCEDURE — 3079F DIAST BP 80-89 MM HG: CPT | Performed by: INTERNAL MEDICINE

## 2024-11-04 PROCEDURE — 99214 OFFICE O/P EST MOD 30 MIN: CPT | Performed by: INTERNAL MEDICINE

## 2024-11-04 RX ORDER — TESTOSTERONE CYPIONATE 200 MG/ML
INJECTION, SOLUTION INTRAMUSCULAR
COMMUNITY
Start: 2024-11-04

## 2024-11-04 RX ORDER — GLIMEPIRIDE 4 MG/1
4 TABLET ORAL EVERY MORNING
Qty: 90 TABLET | Refills: 3 | Status: SHIPPED | OUTPATIENT
Start: 2024-11-04 | End: 2024-11-04 | Stop reason: SDUPTHER

## 2024-11-04 RX ORDER — GLIMEPIRIDE 4 MG/1
4 TABLET ORAL 2 TIMES DAILY
COMMUNITY
Start: 2024-11-04

## 2024-11-04 RX ORDER — NITROGLYCERIN 0.4 MG/1
0.4 TABLET SUBLINGUAL PRN
Qty: 25 TABLET | Refills: 11 | Status: SHIPPED | OUTPATIENT
Start: 2024-11-04

## 2024-11-04 RX ORDER — SUCRALFATE 1 G/1
1 TABLET ORAL 2 TIMES DAILY
Qty: 60 TABLET | Refills: 1 | Status: SHIPPED | OUTPATIENT
Start: 2024-11-04

## 2024-11-04 ASSESSMENT — FIBROSIS 4 INDEX: FIB4 SCORE: 0.89

## 2024-11-04 NOTE — PROGRESS NOTES
Medical Decision Making:  Today's Assessment / Status / Plan:     -Multiple things to address today.  -Not certain he is tolerating the rosuvastatin, check a CK and take a statin holiday.  We know at least he can take pravastatin.  -If his LDL is not to goal, we will probably need to switch ezetimibe to PCSK9 inhibitor.  -Continue secondary prevention aspirin, statin for long-term.  -Diabetes is not controlled and he would benefit from GLP-1 receptor agonist and also SGLT2 inhibitor, sending him to clinical pharmacist.  -Checking blood pressure at home, typically looks better at home  -Testosterone does been appropriately replaced is safe for heart patients.  -His chest pressure is atypical, I would like him to use nitroglycerin as a test.  -Trying to lose weight, Sandi was previously helpful  -Working on thyroid, medications adjusted  -I would like to see him back in 2-3 months to see how he's doing with med changes      Assessment:     1. Coronary artery disease involving native coronary artery of native heart without angina pectoris  EKG    nitroglycerin (NITROSTAT) 0.4 MG SL Tab      2. Pure hypercholesterolemia  Referral to Pharmacotherapy Service      3. Primary hypertension        4. Type 2 diabetes mellitus with diabetic microalbuminuria, without long-term current use of insulin (HCC)  Referral to Pharmacotherapy Service      5. Chronic fatigue        6. Other chest pain        7. Allergy to iodine        8. Low testosterone in male  testosterone cypionate (DEPO-TESTOSTERONE) 200 MG/ML injection      9. Myalgia  CPK - TOTAL SERUM      10. Statin intolerance  CPK - TOTAL SERUM    Referral to Pharmacotherapy Service        Chief Complaint:   Chief Complaint   Patient presents with    Coronary Artery Disease    Hypertension     Giuseppe Bhandari is a 49 y.o. male who returns for premature CAD/MI/stent, HLP, now DM2, HTN, iodine allergy, statin intolerance    He was working out in the gym, had been  having chest discomfort he thought was related to lifting weights, would get tired, lightheaded, fatigued, nauseous.   The month before his MI, he thought he had the flu. Was visiting here from [place].   His cardio was being limiting by sign shortness of breath. Was at the gym, struggling, thought he was going to dry home but somehow drove to the ED, he does not really recall making the decision to go to the ED, vaguely recalls being in the ED, they reported he was sweating heavily.    Had NSTEMI October 3, 2018 in [facility] 95% prox LAD that was treated with a 4.5 x 18 mm drug-eluting stent.    There is minimal disease elsewhere.    Echocardiogram demonstrated a normal left ventricular systolic function with an LVEF of 60. Sounds like he may have had a Nuc or CT scan.  Had hives.  Completed DAPT.  On statin, BB, ASA.    Has HLP, on lipitor 80 mg, ldl 60, hdl low. LDL was initially 131.  Recalls being on pravastatin with no side effects.  Currently on rosuvastatin 40 mg with ezetimibe.  Having joint pain, wondering about the statin.    DM2. Not controlled recently.     Has HTN, controlled.   Reports cough on lisinopril.  Has an XL BP cuff now.  Does get mildly lightheaded at times upon standing, not limiting.    Was having some lightheaded symptoms that correlated with sinusitis.    Prior covid infection.  His O2 level has dropped, cxray was ok.  Resolved, feels 70-75% back to baseline.  Recent COVID again.    Some lower extremity edema, not new, consistent with some mild venous insufficiency.    Intermittent pains in the chest, does not remind him of prior heart disease.  Can feel like a pressure just left of the sternum.  Can last 5 to 10 minutes.  I asked him to try nitroglycerin.  He is not limited by chest pain, pressure or tightness with activity.   Reports mild dyspnea on exertion with cardio exercise, has not changed.  No orthopnea.  No significant palpitations.  No  presyncope/syncope.   No symptoms of leg  claudication.   No stroke/TIA like symptoms.    Fathers HX not known.  No family history of premature coronary artery disease on mother's side.  No prior smoking history.  No history of hypertension.  No history of autoimmune disease such as lupus or rheumatoid arthritis.  No chronic kidney disease.     to Griselda Galvan who is here today.  Prior divorse, 3 kids with her, much stress, kids in [place].   Used to work as MA at [employer], now at [employer].     DATA REVIEWED by me:  ECG   11-4-24 Sinus 57, NS T wave changes  9-24-21 Sinus, 52    ECG 10-2-18 [place]  Sinus, 98     Echo 10-3-18 [place]  EF 60%, normal wall motion    LHC 10-3-18 [place]  95% prox LAD, [facility] [place] GARRET, 4.5x18 mm, LVEDP 15 mmHg    MRA chest 10-15-19  MRA of the thoracic aorta within normal limits. No aneurysmal dilatation or evidence of dissection.    Most recent labs:     Lab Results   Component Value Date/Time    HEMOGLOBIN 18.7 (H) 10/21/2024 10:14 AM    HEMATOCRIT 57.6 (H) 10/21/2024 10:14 AM    MCV 86.0 10/21/2024 10:14 AM    INR 0.95 11/12/2020 08:09 AM      Lab Results   Component Value Date/Time    SODIUM 139 05/17/2024 09:33 AM    POTASSIUM 4.6 05/17/2024 09:33 AM    CHLORIDE 101 05/17/2024 09:33 AM    CO2 27 05/17/2024 09:33 AM    GLUCOSE 117 (H) 05/17/2024 09:33 AM    BUN 18 05/17/2024 09:33 AM    CREATININE 1.10 05/17/2024 09:33 AM    CREATININE 1.0 01/26/2006 01:23 PM      Lab Results   Component Value Date/Time    ASTSGOT 22 05/17/2024 09:33 AM    ALTSGPT 34 05/17/2024 09:33 AM    ALBUMIN 4.3 05/17/2024 09:33 AM      Lab Results   Component Value Date/Time    CHOLSTRLTOT 179 05/17/2024 09:33 AM     (H) 05/17/2024 09:33 AM    HDL 41 05/17/2024 09:33 AM    TRIGLYCERIDE 86 05/17/2024 09:33 AM       9-7-19 h 15.4, p 217, ha 141, k 4.1, cr 0.98, lfts normal, a1c 6.1. ldl 60, hdl 39, tg 116, tc 122    Past Medical History:   Diagnosis Date    Bowel habit changes 02/2021    constipation and diarrhea    Glaucoma     L eye    Heart burn  "    High cholesterol     Hypertension     Myocardial infarct (HCC)     Snoring     Tuberculosis 2008    no treatment, \"not active\"     Past Surgical History:   Procedure Laterality Date    WA UPPER GI ENDOSCOPY,DIAGNOSIS  2/22/2021    Procedure: GASTROSCOPY;  Surgeon: Jcarlos Thomas M.D.;  Location: [facility];  Service: EUS    EGD W/ENDOSCOPIC ULTRASOUND  2/22/2021    Procedure: EGD, WITH ENDOSCOPIC US - UPPER RADIAL LINEAR;  Surgeon: Jcarlos Thomas M.D.;  Location: SURGERY [facility];  Service: EUS    VASECTOMY  2007    FUSION, SPINE, LUMBAR, PLIF  2004    L4-S1     Family History   Problem Relation Age of Onset    Diabetes Mother     No Known Problems Father      Social History     Socioeconomic History    Marital status:      Spouse name: Not on file    Number of children: Not on file    Years of education: Not on file    Highest education level: Associate degree: occupational, technical, or vocational program   Occupational History    Not on file   Tobacco Use    Smoking status: Never    Smokeless tobacco: Never   Vaping Use    Vaping status: Never Used   Substance and Sexual Activity    Alcohol use: Not Currently     Comment: occasional, rare    Drug use: No    Sexual activity: Yes     Partners: Female     Birth control/protection: Surgical   Other Topics Concern    Not on file   Social History Narrative    Not on file     Social Drivers of Health     Financial Resource Strain: Low Risk  (1/25/2021)    Overall Financial Resource Strain (CARDIA)     Difficulty of Paying Living Expenses: Not hard at all   Food Insecurity: No Food Insecurity (1/25/2021)    Hunger Vital Sign     Worried About Running Out of Food in the Last Year: Never true     Ran Out of Food in the Last Year: Never true   Transportation Needs: No Transportation Needs (1/25/2021)    PRAPARE - Transportation     Lack of Transportation (Medical): No     Lack of Transportation (Non-Medical): No   Physical Activity: Sufficiently Active " (1/25/2021)    Exercise Vital Sign     Days of Exercise per Week: 4 days     Minutes of Exercise per Session: 120 min   Stress: Stress Concern Present (1/25/2021)    [employer] [place] [employer] - Occupational Stress Questionnaire     Feeling of Stress : To some extent   Social Connections: Moderately Integrated (1/25/2021)    Social Connection and Isolation Panel [NHANES]     Frequency of Communication with Friends and Family: More than three times a week     Frequency of Social Gatherings with Friends and Family: Once a week     Attends Druze Services: More than 4 times per year     Active Member of Clubs or Organizations: Yes     Attends Club or Organization Meetings: More than 4 times per year     Marital Status:    Intimate Partner Violence: Not on file   Housing Stability: Unknown (1/25/2021)    Housing Stability Vital Sign     Unable to Pay for Housing in the Last Year: No     Number of Places Lived in the Last Year: Not on file     Unstable Housing in the Last Year: No     Allergies   Allergen Reactions    Iodine Hives, Rash and Swelling     Topical - rash  IV contrast - full body hives and swelling    Lisinopril Cough    Pineapple Itching and Swelling     Tongue swelling and itchy throat    Shellfish Allergy Anaphylaxis    Biofreeze Rash     Rash and blisters    Cat Hair Extract Hives, Shortness of Breath, Rash, Runny Nose, Itching, Swelling and Anxiety          Latex Rash          Atorvastatin      Myaglias    Mushroom Extract Complex Swelling     Swelling and mouth numbness        Current Outpatient Medications   Medication Sig Dispense Refill    sucralfate (CARAFATE) 1 GM Tab Take 1 tablet by mouth twice daily 60 Tablet 1    glimepiride (AMARYL) 4 MG Tab Take 1 Tablet by mouth 2 times a day.      testosterone cypionate (DEPO-TESTOSTERONE) 200 MG/ML injection INJECT 1 ML INTRAMUSCULARLY INTO THE SHOULDER, THIGH OR BUTTOCKS ONCE EVERY 2 WEEKS.  For 90 days      nitroglycerin  "(NITROSTAT) 0.4 MG SL Tab Place 1 Tablet under the tongue as needed for Chest Pain. 25 Tablet 11    levothyroxine (SYNTHROID) 100 MCG Tab Take 1 Tablet by mouth every morning on an empty stomach. 30 Tablet 1    ezetimibe (ZETIA) 10 MG Tab Take 10 mg by mouth every day.      rosuvastatin (CRESTOR) 40 MG tablet Take 40 mg by mouth every day.      Syringe/Needle, Disp, (SYRINGE 3CC/23GX1\") 23G X 1\" 3 ML Misc Use with testosterone every 2 weeks 6 Each 3    glucose blood strip 1 Strip by Other route every day. 100 Strip 3    EPINEPHrine (EPIPEN) 0.3 MG/0.3ML Solution Auto-injector solution for injection Inject 0.3 mL into the thigh one time for 1 dose 2 Each 2    lansoprazole (PREVACID) 30 MG CAPSULE DELAYED RELEASE Take 1 Capsule by mouth every day. 90 Capsule 3    aspirin EC (ECOTRIN) 81 MG Tablet Delayed Response Take 162 mg by mouth every morning. 2 tablets = 162 mg.      Multiple Vitamins-Minerals (MENS MULTIVITAMIN PO) Take 2 Capsules by mouth every day. [facility] brand.      losartan (COZAAR) 50 MG Tab Take 1 Tablet by mouth every day. 90 Tablet 7    fexofenadine (ALLEGRA) 180 MG tablet Take 180 mg by mouth every day.       No current facility-administered medications for this visit.     ROS  All others systems reviewed and negative.     Objective:     BP (!) 150/84 (BP Location: Left arm, Patient Position: Sitting, BP Cuff Size: Adult)   Pulse 70   Resp 18   Ht 1.753 m (5' 9\")   Wt 114 kg (251 lb)   SpO2 95%  Body mass index is 37.07 kg/m².    Physical Exam   General: No acute distress. Well nourished.  HEENT: EOM grossly intact, no scleral icterus, no pharyngeal erythema.   Neck:  No JVD, no bruits, trachea midline  CVS: RRR. Normal S1, S2. No M/R/G. No LE edema.  2+ radial pulses, 2+ PT pulses  Resp: CTAB. No wheezing or crackles/rhonchi. Normal respiratory effort.  Abdomen: Soft, NT, no solo hepatomegaly.  MSK/Ext: No clubbing or cyanosis.  Skin: Warm and dry, no rashes.  Neurological: CN III-XII grossly " intact. No focal deficits.   Psych: A&O x 3, appropriate affect, good judgement    Physical Exam listed below was completed in entrety today and unchanged from 4-11-22 except where noted.  Some elements were copied from my note of that same day, which have been updated where appropriate and all reflect current meedical decision making from today.  I have confirmed and edited as necessary, the PFSH and ROS obtained by others.    Written instructions given today:    -Think of your statin is a powerful vascular anti-inflammatory medication.  We want to find a statin that you can take with no side effects.  Once we have that figured out, we can use nonstatin medications to get the cholesterol down the rest of the way.    -Go to the lab and have a CK blood test drawn to ensure that there is no muscle damage from the rosuvastatin.  -Stop the rosuvastatin from either 1 or 2 weeks, see if the achy joints feel better.  Restart the rosuvastatin at the full dose and note if you feel worse.  This is how we determine if you are reacting to the rosuvastatin.    -If you are not able to take the rosuvastatin, we will get you back on the pravastatin but we will probably have to change her ezetimibe to the medication class called PCSK9 inhibitor which includes Repatha and Praluent.  We can help with prior authorization paperwork for this.    -Chronic venous insufficiency is coming for all of us.  Compression socks is the best way to prevent it.  Knee high compression socks are the best way to treat or prevent venous insufficieny.  They can be custom made (expense) or purchased online.  The socks are based on shoe size and amount of compression (mmHg).  I recommend a larger size to avoid toe compression.  Compression strengths:  8-15 mmHg (typically very light compression)  15-20 mmHg  20-30 mmHg  30-40 mmhg (can be very tight and challenging to get on)    *Since they are a standard size larger legs need less compression, smaller legs  may need more compression.    -Heart patients with diabetes should be on a class of medications called GLP-1 receptor agonist.  This includes Rybelsus.  Other medications that are good also include Jardiance.  Will glipizide lowers blood sugar, it does not have a vascular or heart benefit like the other medications.  Time to upgrade.  *I am referring you to a clinical pharmacist who can help with getting you on the correct medications for diabetes and also potentially Repatha or Praluent.    It is important to check blood pressure at home from time to time at home to ensure your blood pressure is in a good range because we do not check it correctly in the doctor's office.    Checking Blood Pressure:    -Be sure you have a good blood pressure cuff, spend in the $40-60, ideally a “calibrated” cuff such as Omron.  I recommend purchasing your cuff from a company with a good return policy.    -Your BP machine should be an automatic, upper arm cuff.  -Measure your arm circumference to get the correct size, probably adult Large. If your arm circumference is greater than the size listed (typically 16.5-17 in), you may need an XL cuff (order from a special pharmacy or durable medical equipment company).  If your arm is small you might need an Adult Small or XL.    -Sit with back support, legs uncrossed, feet flat on the floor.  Your arm should be propped up level with your heart but completely supported, dead weight.  -Put the cuff in place, then wait 5 minutes before pushing the button, 10 minutes would be ideal. No talking, no TV, no social media, etc. Set a timer on your phone.    -Do not check blood pressure within 30 minutes of exercise, caffeine, or large meals.    -It is ok to check immediately if you are experiencing a symptom which may be related to high or low blood pressure.    -Write your blood pressures down, keep a log and bring this to your appointments.    -Check no more than 1 time a day on normal days,  maybe 1-2 times per week, try different times of day.  Typically do not check in the morning before your medications unless this information is helpful for you.    -You are concerned your blood pressure cuff is not giving you accurate data, you can bring your cuff to at least one nursing appointment where it can be calibrated to a manual cuff.    -Goal blood pressure is at least under 130/80, ideally under 120/80.  If you think your BP is overall too high, reach out to your PCP or another provider for assistance.    Salt restriction is key for blood pressure control. Salt is killing Americans, it is in almost everything.  Salt=sodium=sea salt, guidelines say stay under 2,400 mg daily but I ask for under 3,000 mg daily if possible.  Get salt smart, start looking at labels, count it up.  Salt is hidden in everything, salad dressing, sauces, cheese, most canned food, any processed meat.    *Use nitroglycerin when you have chest pain as a test.  If it instantly makes you feel better then keep going about your business.  Report back if the nitroglycerin helps.  If is not helping, this points away from the heart.  If it is helping, is not a medical emergency, it is good to have something help you feel better.    Return in about 2 months (around 1/4/2025).    It is my pleasure to participate in the care of Mr. Bhandari.  Please do not hesitate to contact me with questions or concerns.    Jovanna Gee MD, [facility]  Cardiologist [facility]    Please note that this dictation was created using voice recognition software. I have made every reasonable attempt to correct obvious errors, but it is possible there are errors of grammar and possibly content that I did not discover before finalizing the note.

## 2024-11-04 NOTE — PATIENT INSTRUCTIONS
-Think of your statin is a powerful vascular anti-inflammatory medication.  We want to find a statin that you can take with no side effects.  Once we have that figured out, we can use nonstatin medications to get the cholesterol down the rest of the way.    -Go to the lab and have a CK blood test drawn to ensure that there is no muscle damage from the rosuvastatin.  -Stop the rosuvastatin from either 1 or 2 weeks, see if the achy joints feel better.  Restart the rosuvastatin at the full dose and note if you feel worse.  This is how we determine if you are reacting to the rosuvastatin.    -If you are not able to take the rosuvastatin, we will get you back on the pravastatin but we will probably have to change her ezetimibe to the medication class called PCSK9 inhibitor which includes Repatha and Praluent.  We can help with prior authorization paperwork for this.    -Chronic venous insufficiency is coming for all of us.  Compression socks is the best way to prevent it.  Knee high compression socks are the best way to treat or prevent venous insufficieny.  They can be custom made (expense) or purchased online.  The socks are based on shoe size and amount of compression (mmHg).  I recommend a larger size to avoid toe compression.  Compression strengths:  8-15 mmHg (typically very light compression)  15-20 mmHg  20-30 mmHg  30-40 mmhg (can be very tight and challenging to get on)    *Since they are a standard size larger legs need less compression, smaller legs may need more compression.    -Heart patients with diabetes should be on a class of medications called GLP-1 receptor agonist.  This includes Rybelsus.  Other medications that are good also include Jardiance.  Will glipizide lowers blood sugar, it does not have a vascular or heart benefit like the other medications.  Time to upgrade.  *I am referring you to a clinical pharmacist who can help with getting you on the correct medications for diabetes and also  potentially Repatha or Praluent.    It is important to check blood pressure at home from time to time at home to ensure your blood pressure is in a good range because we do not check it correctly in the doctor's office.    Checking Blood Pressure:    -Be sure you have a good blood pressure cuff, spend in the $40-60, ideally a “calibrated” cuff such as Omron.  I recommend purchasing your cuff from a company with a good return policy.    -Your BP machine should be an automatic, upper arm cuff.  -Measure your arm circumference to get the correct size, probably adult Large. If your arm circumference is greater than the size listed (typically 16.5-17 in), you may need an XL cuff (order from a special pharmacy or durable medical equipment company).  If your arm is small you might need an Adult Small or XL.    -Sit with back support, legs uncrossed, feet flat on the floor.  Your arm should be propped up level with your heart but completely supported, dead weight.  -Put the cuff in place, then wait 5 minutes before pushing the button, 10 minutes would be ideal. No talking, no TV, no social media, etc. Set a timer on your phone.    -Do not check blood pressure within 30 minutes of exercise, caffeine, or large meals.    -It is ok to check immediately if you are experiencing a symptom which may be related to high or low blood pressure.    -Write your blood pressures down, keep a log and bring this to your appointments.    -Check no more than 1 time a day on normal days, maybe 1-2 times per week, try different times of day.  Typically do not check in the morning before your medications unless this information is helpful for you.    -You are concerned your blood pressure cuff is not giving you accurate data, you can bring your cuff to at least one nursing appointment where it can be calibrated to a manual cuff.    -Goal blood pressure is at least under 130/80, ideally under 120/80.  If you think your BP is overall too high,  reach out to your PCP or another provider for assistance.    Salt restriction is key for blood pressure control. Salt is killing Americans, it is in almost everything.  Salt=sodium=sea salt, guidelines say stay under 2,400 mg daily but I ask for under 3,000 mg daily if possible.  Get salt smart, start looking at labels, count it up.  Salt is hidden in everything, salad dressing, sauces, cheese, most canned food, any processed meat.      *Use nitroglycerin when you have chest pain as a test.  If it instantly makes you feel better then keep going about your business.  Report back if the nitroglycerin helps.  If is not helping, this points away from the heart.  If it is helping, is not a medical emergency, it is good to have something help you feel better.

## 2024-11-04 NOTE — TELEPHONE ENCOUNTER
Received request via: Pharmacy    Was the patient seen in the last year in this department? Yes    Does the patient have an active prescription (recently filled or refills available) for medication(s) requested? No    Pharmacy Name: [facility] [address] [place], [place] [address] [facility]     Does the patient have longterm Plus and need 100-day supply? (This applies to ALL medications) Patient does not have SCP

## 2024-11-04 NOTE — LETTER
[facility] - Operated by [facility]   [address], [address]  [place] [address]  Phone: [phone]  Fax: [phone]              Giuseppe Salcidoaneda  1975    Encounter Date: 11/4/2024    Jovanna Gee M.D.          PROGRESS NOTE:      Medical Decision Making:  Today's Assessment / Status / Plan:     -Multiple things to address today.  -Not certain he is tolerating the rosuvastatin, check a CK and take a statin holiday.  We know at least he can take pravastatin.  -If his LDL is not to goal, we will probably need to switch ezetimibe to PCSK9 inhibitor.  -Continue secondary prevention aspirin, statin for long-term.  -Diabetes is not controlled and he would benefit from GLP-1 receptor agonist and also SGLT2 inhibitor, sending him to clinical pharmacist.  -Checking blood pressure at home, typically looks better at home  -Testosterone does been appropriately replaced is safe for heart patients.  -His chest pressure is atypical, I would like him to use nitroglycerin as a test.  -Trying to lose weight, Sandi was previously helpful  -Working on thyroid, medications adjusted  -I would like to see him back in 2-3 months to see how he's doing with med changes      Assessment:     1. Coronary artery disease involving native coronary artery of native heart without angina pectoris  EKG    nitroglycerin (NITROSTAT) 0.4 MG SL Tab      2. Pure hypercholesterolemia  Referral to Pharmacotherapy Service      3. Primary hypertension        4. Type 2 diabetes mellitus with diabetic microalbuminuria, without long-term current use of insulin (HCC)  Referral to Pharmacotherapy Service      5. Chronic fatigue        6. Other chest pain        7. Allergy to iodine        8. Low testosterone in male  testosterone cypionate (DEPO-TESTOSTERONE) 200 MG/ML injection      9. Myalgia  CPK - TOTAL SERUM      10. Statin intolerance  CPK - TOTAL SERUM     Referral to Pharmacotherapy Service        Chief Complaint:   Chief Complaint   Patient presents with   • Coronary Artery Disease   • Hypertension     Giuseppe Bhandari is a 49 y.o. male who returns for premature CAD/MI/stent, HLP, now DM2, HTN, iodine allergy, statin intolerance    He was working out in the gym, had been having chest discomfort he thought was related to lifting weights, would get tired, lightheaded, fatigued, nauseous.   The month before his MI, he thought he had the flu. Was visiting here from [place].   His cardio was being limiting by sign shortness of breath. Was at the gym, struggling, thought he was going to dry home but somehow drove to the ED, he does not really recall making the decision to go to the ED, vaguely recalls being in the ED, they reported he was sweating heavily.    Had NSTEMI October 3, 2018 in [facility] 95% prox LAD that was treated with a 4.5 x 18 mm drug-eluting stent.    There is minimal disease elsewhere.    Echocardiogram demonstrated a normal left ventricular systolic function with an LVEF of 60. Sounds like he may have had a Nuc or CT scan.  Had hives.  Completed DAPT.  On statin, BB, ASA.    Has HLP, on lipitor 80 mg, ldl 60, hdl low. LDL was initially 131.  Recalls being on pravastatin with no side effects.  Currently on rosuvastatin 40 mg with ezetimibe.  Having joint pain, wondering about the statin.    DM2. Not controlled recently.     Has HTN, controlled.   Reports cough on lisinopril.  Has an XL BP cuff now.  Does get mildly lightheaded at times upon standing, not limiting.    Was having some lightheaded symptoms that correlated with sinusitis.    Prior covid infection.  His O2 level has dropped, cxray was ok.  Resolved, feels 70-75% back to baseline.  Recent COVID again.    Some lower extremity edema, not new, consistent with some mild venous insufficiency.    Intermittent pains in the chest, does not remind him of prior heart disease.  Can feel like a pressure  just left of the sternum.  Can last 5 to 10 minutes.  I asked him to try nitroglycerin.  He is not limited by chest pain, pressure or tightness with activity.   Reports mild dyspnea on exertion with cardio exercise, has not changed.  No orthopnea.  No significant palpitations.  No  presyncope/syncope.   No symptoms of leg claudication.   No stroke/TIA like symptoms.    Fathers HX not known.  No family history of premature coronary artery disease on mother's side.  No prior smoking history.  No history of hypertension.  No history of autoimmune disease such as lupus or rheumatoid arthritis.  No chronic kidney disease.     to Griselda Galvan who is here today.  Prior divorse, 3 kids with her, much stress, kids in [place].   Used to work as MA at [employer], now at [employer].     DATA REVIEWED by me:  ECG   11-4-24 Sinus 57, NS T wave changes  9-24-21 Sinus, 52    ECG 10-2-18 [place]  Sinus, 98     Echo 10-3-18 [place]  EF 60%, normal wall motion    LHC 10-3-18 [place]  95% prox LAD, Resolute Angelo GARRET, 4.5x18 mm, LVEDP 15 mmHg    MRA chest 10-15-19  MRA of the thoracic aorta within normal limits. No aneurysmal dilatation or evidence of dissection.    Most recent labs:     Lab Results   Component Value Date/Time    HEMOGLOBIN 18.7 (H) 10/21/2024 10:14 AM    HEMATOCRIT 57.6 (H) 10/21/2024 10:14 AM    MCV 86.0 10/21/2024 10:14 AM    INR 0.95 11/12/2020 08:09 AM      Lab Results   Component Value Date/Time    SODIUM 139 05/17/2024 09:33 AM    POTASSIUM 4.6 05/17/2024 09:33 AM    CHLORIDE 101 05/17/2024 09:33 AM    CO2 27 05/17/2024 09:33 AM    GLUCOSE 117 (H) 05/17/2024 09:33 AM    BUN 18 05/17/2024 09:33 AM    CREATININE 1.10 05/17/2024 09:33 AM    CREATININE 1.0 01/26/2006 01:23 PM      Lab Results   Component Value Date/Time    ASTSGOT 22 05/17/2024 09:33 AM    ALTSGPT 34 05/17/2024 09:33 AM    ALBUMIN 4.3 05/17/2024 09:33 AM      Lab Results   Component Value Date/Time    CHOLSTRLTOT 179 05/17/2024 09:33 AM     (H)  "05/17/2024 09:33 AM    HDL 41 05/17/2024 09:33 AM    TRIGLYCERIDE 86 05/17/2024 09:33 AM       9-7-19 h 15.4, p 217, ha 141, k 4.1, cr 0.98, lfts normal, a1c 6.1. ldl 60, hdl 39, tg 116, tc 122    Past Medical History:   Diagnosis Date   • Bowel habit changes 02/2021    constipation and diarrhea   • Glaucoma     L eye   • Heart burn    • High cholesterol    • Hypertension    • Myocardial infarct (HCC)    • Snoring    • Tuberculosis 2008    no treatment, \"not active\"     Past Surgical History:   Procedure Laterality Date   • UT UPPER GI ENDOSCOPY,DIAGNOSIS  2/22/2021    Procedure: GASTROSCOPY;  Surgeon: Jcarlos Thomas M.D.;  Location: SURGERY [facility];  Service: EUS   • EGD W/ENDOSCOPIC ULTRASOUND  2/22/2021    Procedure: EGD, WITH ENDOSCOPIC US - UPPER RADIAL LINEAR;  Surgeon: Jcarlos Thomas M.D.;  Location: SURGERY [facility];  Service: EUS   • VASECTOMY  2007   • FUSION, SPINE, LUMBAR, PLIF  2004    L4-S1     Family History   Problem Relation Age of Onset   • Diabetes Mother    • No Known Problems Father      Social History     Socioeconomic History   • Marital status:      Spouse name: Not on file   • Number of children: Not on file   • Years of education: Not on file   • Highest education level: Associate degree: occupational, technical, or vocational program   Occupational History   • Not on file   Tobacco Use   • Smoking status: Never   • Smokeless tobacco: Never   Vaping Use   • Vaping status: Never Used   Substance and Sexual Activity   • Alcohol use: Not Currently     Comment: occasional, rare   • Drug use: No   • Sexual activity: Yes     Partners: Female     Birth control/protection: Surgical   Other Topics Concern   • Not on file   Social History Narrative   • Not on file     Social Drivers of Health     Financial Resource Strain: Low Risk  (1/25/2021)    Overall Financial Resource Strain (CARDIA)    • Difficulty of Paying Living Expenses: Not hard at all   Food Insecurity: No Food Insecurity " (1/25/2021)    Hunger Vital Sign    • Worried About Running Out of Food in the Last Year: Never true    • Ran Out of Food in the Last Year: Never true   Transportation Needs: No Transportation Needs (1/25/2021)    PRAPARE - Transportation    • Lack of Transportation (Medical): No    • Lack of Transportation (Non-Medical): No   Physical Activity: Sufficiently Active (1/25/2021)    Exercise Vital Sign    • Days of Exercise per Week: 4 days    • Minutes of Exercise per Session: 120 min   Stress: Stress Concern Present (1/25/2021)    [place] citizen [place] of Occupational Health - Occupational Stress Questionnaire    • Feeling of Stress : To some extent   Social Connections: Moderately Integrated (1/25/2021)    Social Connection and Isolation Panel [NHANES]    • Frequency of Communication with Friends and Family: More than three times a week    • Frequency of Social Gatherings with Friends and Family: Once a week    • Attends [employer] Services: More than 4 times per year    • Active Member of Clubs or Organizations: Yes    • Attends Club or Organization Meetings: More than 4 times per year    • Marital Status:    Intimate Partner Violence: Not on file   Housing Stability: Unknown (1/25/2021)    Housing Stability Vital Sign    • Unable to Pay for Housing in the Last Year: No    • Number of Places Lived in the Last Year: Not on file    • Unstable Housing in the Last Year: No     Allergies   Allergen Reactions   • Iodine Hives, Rash and Swelling     Topical - rash  IV contrast - full body hives and swelling   • Lisinopril Cough   • Pineapple Itching and Swelling     Tongue swelling and itchy throat   • Shellfish Allergy Anaphylaxis   • Biofreeze Rash     Rash and blisters   • Cat Hair Extract Hives, Shortness of Breath, Rash, Runny Nose, Itching, Swelling and Anxiety         • Latex Rash         • Atorvastatin      Myaglias   • Mushroom Extract Complex Swelling     Swelling and mouth numbness        Current Outpatient  "Medications   Medication Sig Dispense Refill   • sucralfate (CARAFATE) 1 GM Tab Take 1 tablet by mouth twice daily 60 Tablet 1   • glimepiride (AMARYL) 4 MG Tab Take 1 Tablet by mouth 2 times a day.     • testosterone cypionate (DEPO-TESTOSTERONE) 200 MG/ML injection INJECT 1 ML INTRAMUSCULARLY INTO THE SHOULDER, THIGH OR BUTTOCKS ONCE EVERY 2 WEEKS.  For 90 days     • nitroglycerin (NITROSTAT) 0.4 MG SL Tab Place 1 Tablet under the tongue as needed for Chest Pain. 25 Tablet 11   • levothyroxine (SYNTHROID) 100 MCG Tab Take 1 Tablet by mouth every morning on an empty stomach. 30 Tablet 1   • ezetimibe (ZETIA) 10 MG Tab Take 10 mg by mouth every day.     • rosuvastatin (CRESTOR) 40 MG tablet Take 40 mg by mouth every day.     • Syringe/Needle, Disp, (SYRINGE 3CC/23GX1\") 23G X 1\" 3 ML Misc Use with testosterone every 2 weeks 6 Each 3   • glucose blood strip 1 Strip by Other route every day. 100 Strip 3   • EPINEPHrine (EPIPEN) 0.3 MG/0.3ML Solution Auto-injector solution for injection Inject 0.3 mL into the thigh one time for 1 dose 2 Each 2   • lansoprazole (PREVACID) 30 MG CAPSULE DELAYED RELEASE Take 1 Capsule by mouth every day. 90 Capsule 3   • aspirin EC (ECOTRIN) 81 MG Tablet Delayed Response Take 162 mg by mouth every morning. 2 tablets = 162 mg.     • Multiple Vitamins-Minerals (MENS MULTIVITAMIN PO) Take 2 Capsules by mouth every day. [facility] brand.     • losartan (COZAAR) 50 MG Tab Take 1 Tablet by mouth every day. 90 Tablet 7   • fexofenadine (ALLEGRA) 180 MG tablet Take 180 mg by mouth every day.       No current facility-administered medications for this visit.     ROS  All others systems reviewed and negative.     Objective:     BP (!) 150/84 (BP Location: Left arm, Patient Position: Sitting, BP Cuff Size: Adult)   Pulse 70   Resp 18   Ht 1.753 m (5' 9\")   Wt 114 kg (251 lb)   SpO2 95%  Body mass index is 37.07 kg/m².    Physical Exam   General: No acute distress. Well nourished.  HEENT: EOM grossly " intact, no scleral icterus, no pharyngeal erythema.   Neck:  No JVD, no bruits, trachea midline  CVS: RRR. Normal S1, S2. No M/R/G. No LE edema.  2+ radial pulses, 2+ PT pulses  Resp: CTAB. No wheezing or crackles/rhonchi. Normal respiratory effort.  Abdomen: Soft, NT, no solo hepatomegaly.  MSK/Ext: No clubbing or cyanosis.  Skin: Warm and dry, no rashes.  Neurological: CN III-XII grossly intact. No focal deficits.   Psych: A&O x 3, appropriate affect, good judgement    Physical Exam listed below was completed in entrety today and unchanged from 4-11-22 except where noted.  Some elements were copied from my note of that same day, which have been updated where appropriate and all reflect current meedical decision making from today.  I have confirmed and edited as necessary, the PFSH and ROS obtained by others.    Written instructions given today:    -Think of your statin is a powerful vascular anti-inflammatory medication.  We want to find a statin that you can take with no side effects.  Once we have that figured out, we can use nonstatin medications to get the cholesterol down the rest of the way.    -Go to the lab and have a CK blood test drawn to ensure that there is no muscle damage from the rosuvastatin.  -Stop the rosuvastatin from either 1 or 2 weeks, see if the achy joints feel better.  Restart the rosuvastatin at the full dose and note if you feel worse.  This is how we determine if you are reacting to the rosuvastatin.    -If you are not able to take the rosuvastatin, we will get you back on the pravastatin but we will probably have to change her ezetimibe to the medication class called PCSK9 inhibitor which includes Repatha and Praluent.  We can help with prior authorization paperwork for this.    -Chronic venous insufficiency is coming for all of us.  Compression socks is the best way to prevent it.  Knee high compression socks are the best way to treat or prevent venous insufficieny.  They can be  custom made (expense) or purchased online.  The socks are based on shoe size and amount of compression (mmHg).  I recommend a larger size to avoid toe compression.  Compression strengths:  8-15 mmHg (typically very light compression)  15-20 mmHg  20-30 mmHg  30-40 mmhg (can be very tight and challenging to get on)    *Since they are a standard size larger legs need less compression, smaller legs may need more compression.    -Heart patients with diabetes should be on a class of medications called GLP-1 receptor agonist.  This includes Rybelsus.  Other medications that are good also include Jardiance.  Will glipizide lowers blood sugar, it does not have a vascular or heart benefit like the other medications.  Time to upgrade.  *I am referring you to a clinical pharmacist who can help with getting you on the correct medications for diabetes and also potentially Repatha or Praluent.    It is important to check blood pressure at home from time to time at home to ensure your blood pressure is in a good range because we do not check it correctly in the doctor's office.    Checking Blood Pressure:    -Be sure you have a good blood pressure cuff, spend in the $40-60, ideally a “calibrated” cuff such as Omron.  I recommend purchasing your cuff from a company with a good return policy.    -Your BP machine should be an automatic, upper arm cuff.  -Measure your arm circumference to get the correct size, probably adult Large. If your arm circumference is greater than the size listed (typically 16.5-17 in), you may need an XL cuff (order from a special pharmacy or durable medical equipment company).  If your arm is small you might need an Adult Small or XL.    -Sit with back support, legs uncrossed, feet flat on the floor.  Your arm should be propped up level with your heart but completely supported, dead weight.  -Put the cuff in place, then wait 5 minutes before pushing the button, 10 minutes would be ideal. No talking, no TV,  no social media, etc. Set a timer on your phone.    -Do not check blood pressure within 30 minutes of exercise, caffeine, or large meals.    -It is ok to check immediately if you are experiencing a symptom which may be related to high or low blood pressure.    -Write your blood pressures down, keep a log and bring this to your appointments.    -Check no more than 1 time a day on normal days, maybe 1-2 times per week, try different times of day.  Typically do not check in the morning before your medications unless this information is helpful for you.    -You are concerned your blood pressure cuff is not giving you accurate data, you can bring your cuff to at least one nursing appointment where it can be calibrated to a manual cuff.    -Goal blood pressure is at least under 130/80, ideally under 120/80.  If you think your BP is overall too high, reach out to your PCP or another provider for assistance.    Salt restriction is key for blood pressure control. Salt is killing Americans, it is in almost everything.  Salt=sodium=sea salt, guidelines say stay under 2,400 mg daily but I ask for under 3,000 mg daily if possible.  Get salt smart, start looking at labels, count it up.  Salt is hidden in everything, salad dressing, sauces, cheese, most canned food, any processed meat.    *Use nitroglycerin when you have chest pain as a test.  If it instantly makes you feel better then keep going about your business.  Report back if the nitroglycerin helps.  If is not helping, this points away from the heart.  If it is helping, is not a medical emergency, it is good to have something help you feel better.    Return in about 2 months (around 1/4/2025).    It is my pleasure to participate in the care of Mr. Bhandari.  Please do not hesitate to contact me with questions or concerns.    Jovanna Gee MD, [facility]  Cardiologist [facility]    Please note that this dictation was created using voice  recognition software. I have made every reasonable attempt to correct obvious errors, but it is possible there are errors of grammar and possibly content that I did not discover before finalizing the note.      Manjeet Moyer III, M.D.  [address]  [place] [id number]  Via In Basket

## 2024-11-05 ENCOUNTER — NON-PROVIDER VISIT (OUTPATIENT)
Dept: MEDICAL GROUP | Facility: PHYSICIAN GROUP | Age: 49
End: 2024-11-05
Payer: COMMERCIAL

## 2024-11-05 ENCOUNTER — HOSPITAL ENCOUNTER (OUTPATIENT)
Dept: LAB | Facility: MEDICAL CENTER | Age: 49
End: 2024-11-05
Attending: INTERNAL MEDICINE
Payer: COMMERCIAL

## 2024-11-05 DIAGNOSIS — M79.10 MYALGIA: ICD-10-CM

## 2024-11-05 DIAGNOSIS — Z23 NEED FOR VACCINATION: ICD-10-CM

## 2024-11-05 DIAGNOSIS — Z78.9 STATIN INTOLERANCE: ICD-10-CM

## 2024-11-05 PROCEDURE — 90471 IMMUNIZATION ADMIN: CPT | Performed by: INTERNAL MEDICINE

## 2024-11-05 PROCEDURE — 82550 ASSAY OF CK (CPK): CPT

## 2024-11-05 PROCEDURE — 90656 IIV3 VACC NO PRSV 0.5 ML IM: CPT | Performed by: INTERNAL MEDICINE

## 2024-11-05 PROCEDURE — 36415 COLL VENOUS BLD VENIPUNCTURE: CPT

## 2024-11-05 NOTE — PROGRESS NOTES
"Giuseppe Swift is a 49 y.o. male here for a non-provider visit for:   FLU    Reason for immunization: Annual Flu Vaccine  Immunization records indicate need for vaccine: Yes, confirmed with Epic  Minimum interval has been met for this vaccine: Yes  ABN completed: Yes    VIS Dated  8/6/21 was given to patient: Yes  All IAC Questionnaire questions were answered \"No.\"    Patient tolerated injection and no adverse effects were observed or reported: Yes    Pt scheduled for next dose in series: Not Indicated  "

## 2024-11-06 ENCOUNTER — TELEPHONE (OUTPATIENT)
Dept: CARDIOLOGY | Facility: MEDICAL CENTER | Age: 49
End: 2024-11-06
Payer: COMMERCIAL

## 2024-11-06 DIAGNOSIS — M60.89 OTHER MYOSITIS OF MULTIPLE SITES: ICD-10-CM

## 2024-11-06 LAB — CK SERPL-CCNC: 424 U/L (ref 0–154)

## 2024-11-06 NOTE — TELEPHONE ENCOUNTER
Called pt [phone] to review LS recommendations.  Pt states he hasn't been to the gym in couple months d/t his symptoms.  He states he will be out of town the week recommended to have lab drawn however he will be able to have it completed on 11/25. Giuseppe verbalizes understanding and states no other concerns or questions at this time.  Pt is appreciative of information given.    Medication list updated.

## 2024-11-06 NOTE — TELEPHONE ENCOUNTER
----- Message from Physician Jovanna Gee M.D. sent at 11/6/2024  8:15 AM PST -----  Umu,   -Please call Giuseppe, let me know if he had worked out or lifted weights within 24 hours of the CK blood test being drawn.   -Tell him to stop rosuvastatin  -Have him repeat the CK in 2 weeks, no exercise 24 hours prior.  Thank you.

## 2024-11-19 ENCOUNTER — TELEPHONE (OUTPATIENT)
Dept: HEALTH INFORMATION MANAGEMENT | Facility: OTHER | Age: 49
End: 2024-11-19
Payer: COMMERCIAL

## 2024-11-26 ENCOUNTER — HOSPITAL ENCOUNTER (OUTPATIENT)
Dept: LAB | Facility: MEDICAL CENTER | Age: 49
End: 2024-11-26
Attending: INTERNAL MEDICINE
Payer: COMMERCIAL

## 2024-11-26 DIAGNOSIS — M60.89 OTHER MYOSITIS OF MULTIPLE SITES: ICD-10-CM

## 2024-11-26 LAB — CK SERPL-CCNC: 282 U/L (ref 0–154)

## 2024-11-26 PROCEDURE — 36415 COLL VENOUS BLD VENIPUNCTURE: CPT

## 2024-11-26 PROCEDURE — 82550 ASSAY OF CK (CPK): CPT

## 2024-11-27 ENCOUNTER — PATIENT MESSAGE (OUTPATIENT)
Dept: CARDIOLOGY | Facility: MEDICAL CENTER | Age: 49
End: 2024-11-27
Payer: COMMERCIAL

## 2024-12-02 DIAGNOSIS — R74.8 ELEVATED CPK: ICD-10-CM

## 2024-12-02 NOTE — PATIENT COMMUNICATION
LS: Please advise if pt can start lifting weights again. CPK was elevated still after 3 weeks but improving. Thank you!

## 2024-12-06 ENCOUNTER — HOSPITAL ENCOUNTER (EMERGENCY)
Facility: MEDICAL CENTER | Age: 49
End: 2024-12-06
Attending: EMERGENCY MEDICINE
Payer: COMMERCIAL

## 2024-12-06 VITALS
DIASTOLIC BLOOD PRESSURE: 89 MMHG | BODY MASS INDEX: 36.3 KG/M2 | RESPIRATION RATE: 19 BRPM | HEIGHT: 70 IN | OXYGEN SATURATION: 95 % | HEART RATE: 71 BPM | WEIGHT: 253.53 LBS | TEMPERATURE: 97.8 F | SYSTOLIC BLOOD PRESSURE: 151 MMHG

## 2024-12-06 DIAGNOSIS — I10 HYPERTENSION, UNSPECIFIED TYPE: ICD-10-CM

## 2024-12-06 DIAGNOSIS — M62.838 MUSCLE SPASM: ICD-10-CM

## 2024-12-06 DIAGNOSIS — M54.12 CERVICAL RADICULOPATHY: Primary | ICD-10-CM

## 2024-12-06 PROCEDURE — 700102 HCHG RX REV CODE 250 W/ 637 OVERRIDE(OP): Performed by: EMERGENCY MEDICINE

## 2024-12-06 PROCEDURE — 99284 EMERGENCY DEPT VISIT MOD MDM: CPT

## 2024-12-06 PROCEDURE — A9270 NON-COVERED ITEM OR SERVICE: HCPCS | Performed by: EMERGENCY MEDICINE

## 2024-12-06 RX ORDER — IBUPROFEN 800 MG/1
800 TABLET, FILM COATED ORAL EVERY 8 HOURS PRN
Qty: 9 TABLET | Refills: 0 | Status: SHIPPED | OUTPATIENT
Start: 2024-12-06 | End: 2024-12-09

## 2024-12-06 RX ORDER — ACETAMINOPHEN 500 MG
1000 TABLET ORAL EVERY 6 HOURS PRN
Qty: 20 TABLET | Refills: 0 | Status: SHIPPED | OUTPATIENT
Start: 2024-12-06 | End: 2024-12-10

## 2024-12-06 RX ORDER — OXYCODONE HYDROCHLORIDE 5 MG/1
5 TABLET ORAL ONCE
Status: COMPLETED | OUTPATIENT
Start: 2024-12-06 | End: 2024-12-06

## 2024-12-06 RX ORDER — CYCLOBENZAPRINE HCL 10 MG
10 TABLET ORAL 3 TIMES DAILY PRN
Qty: 10 TABLET | Refills: 0 | Status: SHIPPED | OUTPATIENT
Start: 2024-12-06 | End: 2024-12-09

## 2024-12-06 RX ORDER — LIDOCAINE 50 MG/G
1 PATCH TOPICAL EVERY 24 HOURS
Qty: 5 PATCH | Refills: 0 | Status: SHIPPED | OUTPATIENT
Start: 2024-12-06 | End: 2024-12-11

## 2024-12-06 RX ORDER — ACETAMINOPHEN 500 MG
1000 TABLET ORAL ONCE
Status: COMPLETED | OUTPATIENT
Start: 2024-12-06 | End: 2024-12-06

## 2024-12-06 RX ADMIN — IBUPROFEN 800 MG: 200 TABLET, FILM COATED ORAL at 00:53

## 2024-12-06 RX ADMIN — OXYCODONE HYDROCHLORIDE 5 MG: 5 TABLET ORAL at 00:57

## 2024-12-06 RX ADMIN — ACETAMINOPHEN 1000 MG: 500 TABLET ORAL at 00:57

## 2024-12-06 ASSESSMENT — PAIN DESCRIPTION - PAIN TYPE
TYPE: ACUTE PAIN

## 2024-12-06 ASSESSMENT — FIBROSIS 4 INDEX: FIB4 SCORE: 0.89

## 2024-12-06 NOTE — ED NOTES
Pt. Verbalizes understanding of discharge instructions. Accompanied to lobby with friend. Pt. Alert/awake in NAD.  All questions answered and understood. Advised to ff-up with PCP. Medication teaching done.

## 2024-12-06 NOTE — ED PROVIDER NOTES
ED Provider Note    Scribed for No att. providers found by Sterling Bertrand. 12/6/2024  12:09 AM    Primary care provider: Manjeet Moyer III, M.D.  Means of arrival: private vehicle   History obtained from: Patient  History limited by: None    CHIEF COMPLAINT  Chief Complaint   Patient presents with    Hand Pain       Pt. C/o hand/arm injury.  Pt. States he was pushing a battery pack at work and injured hand arm.         EXTERNAL RECORDS REVIEWED  Outpatient Notes patient follows closely cardiology and had a message from them on the fourth, 2 days ago stating that blood pressure goal was not being achieved    HPI/ROS  LIMITATION TO HISTORY   Select: : None  OUTSIDE HISTORIAN(S):  None    HPI  Giuseppe Swift is a 49 y.o. male who presents to the Emergency Department with no significant contributory past medical history presenting for left shoulder and arm pain.  Patient was working at [employer] moving a cart and had a jerky back to try to avoid a forklift that ultimately did end up hitting the cart pushing the carts handle into his outstretched hand and resulting shoulder, back, pain that has a pins-and-needles like sensation going down his arm.  He is nothing is broken but wanted evaluated as a Workmen's Comp. case.  No prior injuries.  No alcohol drug or tobacco use.  Medically healthy.    REVIEW OF SYSTEMS  As above, all other systems reviewed and are negative.   See HPI for further details.     PAST MEDICAL HISTORY   has a past medical history of Bowel habit changes (02/2021), Glaucoma, Heart burn, High cholesterol, Hypertension, Myocardial infarct (HCC), Snoring, and Tuberculosis (2008).  SURGICAL HISTORY   has a past surgical history that includes fusion, spine, lumbar, plif (2004); vasectomy (2007); upper gi endoscopy,diagnosis (2/22/2021); and egd w/endoscopic ultrasound (2/22/2021).  SOCIAL HISTORY  Social History     Tobacco Use    Smoking status: Never    Smokeless tobacco: Never   Vaping Use     "Vaping status: Never Used   Substance Use Topics    Alcohol use: Not Currently     Comment: occasional, rare    Drug use: No      Social History     Substance and Sexual Activity   Drug Use No     FAMILY HISTORY  Family History   Problem Relation Age of Onset    Diabetes Mother     No Known Problems Father      CURRENT MEDICATIONS  Home Medications    **Home medications have not yet been reviewed for this encounter**       ALLERGIES  Allergies   Allergen Reactions    Iodine Hives, Rash and Swelling     Topical - rash  IV contrast - full body hives and swelling    Lisinopril Cough    Pineapple Itching and Swelling     Tongue swelling and itchy throat    Shellfish Allergy Anaphylaxis    Biofreeze Rash     Rash and blisters    Cat Hair Extract Hives, Shortness of Breath, Rash, Runny Nose, Itching, Swelling and Anxiety          Latex Rash          Atorvastatin      Myaglias    Mushroom Extract Complex Swelling     Swelling and mouth numbness        PHYSICAL EXAM    VITAL SIGNS:   Vitals:    12/06/24 0006 12/06/24 0007   BP:  (!) 161/103   Pulse:  65   Resp:  18   Temp:  36.6 °C (97.8 °F)   TempSrc:  Temporal   SpO2:  93%   Weight: 115 kg (253 lb 8.5 oz)    Height: 1.778 m (5' 10\")        Vitals: My interpretation: Hypertension, not tachycardic, afebrile, not hypoxic    Reinterpretation of vitals: Unchanged and unremarkable    Cardiac Monitor Interpretation: The cardiac monitor revealed normal Sinus Rhythm as interpreted by me. The cardiac monitor was ordered secondary to the patient's history of receiving sedative medications and to monitor for dysrhythmia and/or tachycardia.    PE:   Gen: sitting comfortably, speaking clearly, appears in no acute distress   ENT: Mucous membranes moist, posterior pharynx clear, uvula midline, nares patent bilaterally   Neck: Supple, FROM  Pulmonary: Lungs are clear to auscultation bilaterally. No tachypnea  CV:  RRR, no murmur appreciated, pulses 2+ in both upper and lower " extremities  Abdomen: soft, NT/ND; no rebound/guarding  : no CVA or suprapubic tenderness   Neuro: A&Ox4 (person, place, time, situation), speech fluent, gait steady, no focal deficits appreciated  Skin: No rash or lesions.  No pallor or jaundice.  No cyanosis.  Warm and dry.  Left upper extremity and shoulder: Range of motion is somewhat limited by pain but is intact at the shoulder.   strength normal.  Pulses normal.  No deformity.  No point tenderness.  Tender in the left trapezius and shoulder girdle muscles when palpated.  No point tenderness in the CT or L-spine.    COURSE & MEDICAL DECISION MAKING  Nursing notes, VS, PMSFHx, labs, imaging, EKG reviewed in chart.    Ddx: Strain, sprain, fracture, dislocation    MDM: 12:09 AM Giuseppe Swift is a 49 y.o. male who presented with workman comp case after injury at [employer] working this evening at 8:50 PM.  Patient was trying to pull cart back from being run into by a forklift when the forklift did strike the truck pushing the handle into his outstretched arm resulting in pain in the trapezius and shoulder girdle muscles that sends pins and needle sensation down his arm.  Try to ice it while filling out workman comp paper there.  Medically healthy.  No other injuries.  No head strike or loss of consciousness.  On arrival here he has a fairly unremarkable set of vital signs and a mild hypertension which he was counseled on.  Left upper extremity and shoulder exam shows Range of motion is somewhat limited by pain but is intact at the shoulder.   strength normal.  Pulses normal.  No deformity.  No point tenderness.  Tender in the left trapezius and shoulder girdle muscles when palpated.  No point tenderness in the CT or L-spine.  Overall I discussed the patient and he agrees that imaging is not indicated based on mechanism of injury and exam here today.  Will recommend muscle spasm, radiculopathy, whiplash injury treatment with Tylenol, Motrin,  Flexeril, Lidoderm patches, physical therapy, foam rolling and stretching exercises.  Counseled him regarding his high blood pressure.  He verbalized understanding strict return precautions outpatient follow-up plan and is amenable.    Hypertension counseling:   I spent a total of 5 minutes counseling patient on blood pressure control and management.  We discussed medication management with PCP.  I recommended keeping a blood pressure journal, taking the blood pressure once in the morning once evening after resting for 5 minutes.  Presented in general to PCP and discussing medication management or adjustment.  Discussed hypertensive emergencies and what signs and symptoms to be monitoring for and to return to the ED for.  Patient verbalized understanding and was receptive of counseling.     ADDITIONAL PROBLEM LIST AND DISPOSITION    I have discussed management of the patient with the following physicians and MELIA's:  None    Discussion of management with other QHP or appropriate source(s): None     Escalation of care considered, and ultimately not performed:diagnostic imaging    Barriers to care at this time, including but not limited to:  none .     Decision tools and prescription drugs considered including, but not limited to: Pain Medications Tylenol, Motrin, Flexeril, lidocaine patches .    FINAL IMPRESSION  1. Cervical radiculopathy Acute   2. Muscle spasm Acute   3. Hypertension, unspecified type Acute      The note accurately reflects work and decisions made by me.  Sterling Bertrand  12/6/2024  12:09 AM

## 2024-12-06 NOTE — ED TRIAGE NOTES
Pt. C/o hand/arm injury.  Pt. States he was pushing a battery pack at work and injured hand arm.

## 2024-12-06 NOTE — DISCHARGE INSTRUCTIONS
Please take at 1000 mg of Tylenol 3 times a day. You may also take 800 mg of Motrin 3 times a day. I have prescribed you lidocaine patches and Flexeril 10 mg 3 times a day for breakthrough pain. I recommend using a foam roller which can be purchased at a sporting good store 3 times a day to help with pain. You may benefit from talking with your PCP about physical therapy, stretching exercises and other pain management strategies for your muscle spasm. Thank you for coming in today and please return if any symptoms worsen. Thank you.    Your blood pressure was elevated today.  I want you to take your blood pressure once in the morning and once in the evening and write down the results in a blood pressure journal.  Do this for 2 weeks and then presented to your primary care physician so they can better assess you for management of high blood pressure.

## 2024-12-06 NOTE — LETTER
"    EMPLOYEE’S CLAIM FOR COMPENSATION/ REPORT OF INITIAL TREATMENT  FORM C-4  PLEASE TYPE OR PRINT    EMPLOYEE’S CLAIM - PROVIDE ALL INFORMATION REQUESTED   First Name                    LION Chin                  Last Name  Thony Swift Birthdate                    1975                Sex  Male Claim Number (Insurer’s Use Only)     [address] [address] Age  49 y.o. Height  1.778 m (5' 10\") Weight  115 kg (253 lb 8.5 oz) Social Security Number     [facility] [place]  [address] Telephone  [phone]   Mailing Address  [address]  [address] Primary Language Spoken  English    INSURER   THIRD-PARTY   Natalie   Employee's Occupation (Job Title) When Injury or Occupational Disease Occurred  [employer]    Employer's Name/Company Name  [employer]  Telephone  [phone]    Office Mail Address (Number and Street)  [address]     Date of Injury (if applicable) 12/5/2024               Hours Injury (if applicable)  8:53 PM Date Employer Notified  12/5/2024 Last Day of Work after Injury or Occupational Disease  12/5/2024 Supervisor to Whom Injury Reported  Hank Jimenez   Address or Location of Accident (if applicable)  Work [1]   What were you doing at the time of accident? (if applicable)  Pushing empty pack    How did this injury or occupational disease occur? (Be specific and answer in detail. Use additional sheet if necessary)  pushing an empty pack. 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  N/A Witnesses to the Accident (if " applicable)  Yes. not sure of names      Nature of Injury or Occupational Disease  Workers' Compensation  Part(s) of Body Injured or Affected  Hand (L) Shoulder (L) Elbow (L)    I CERTIFY THAT THE ABOVE IS TRUE AND CORRECT TO T HE BEST OF MY KNOWLEDGE AND THAT I HAVE PROVIDED THIS INFORMATION IN ORDER TO OBTAIN THE BENEFITS OF [place], OR CHAPTER [place]).  I HEREBY AUTHORIZE ANY PHYSICIAN, CHIROPRACTOR, SURGEON, PRACTITIONER OR ANY OTHER PERSON, ANY HOSPITAL, INCLUDING [facility] OR [facility], ANY  MEDICAL SERVICE ORGANIZATION, ANY INSURANCE COMPANY, OR OTHER INSTITUTION OR ORGANIZATION TO RELEASE TO EACH OTHER, ANY MEDICAL OR OTHER INFORMATION, INCLUDING BENEFITS PAID OR PAYABLE, PERTINENT TO THIS INJURY OR DISEASE, EXCEPT INFORMATION RELATIVE TO DIAGNOSIS, TREATMENT AND/OR COUNSELING FOR AIDS, PSYCHOLOGICAL CONDITIONS, ALCOHOL OR CONTROLLED SUBSTANCES, FOR WHICH I MUST GIVE SPECIFIC AUTHORIZATION.  A PHOTOSTAT OF THIS AUTHORIZATION SHALL BE VALID AS THE ORIGINAL.     Date 12/06/2024   [facility] ED Employee’s Original or  *Electronic Signature   THIS REPORT MUST BE COMPLETED AND MAILED WITHIN 3 WORKING DAYS OF TREATMENT   [facility], EMERGENCY DEPT    Name of Facility      Date 12/6/2024 Diagnosis and Description of Injury or Occupational Disease  (M54.12) Cervical radiculopathy, Acute  (primary encounter diagnosis)  (M62.838) Muscle spasm, Acute  (I10) Hypertension, unspecified type, Acute  The primary encounter diagnosis was Cervical radiculopathy. Diagnoses of Muscle spasm and Hypertension, unspecified type were also pertinent to this visit. Is there evidence that the injured employee was under the influence of alcohol and/or another controlled substance at the time of accident?  []No  [] Yes (if yes, please explain)   Hour   No   Treatment: Muscle relaxers and pain  medications    Have you advised the patient to remain off work five days or more?   [] Yes Indicate dates: From   To    [] No      If no, is the injured employee capable of: [] full duty [] modified duty                     If modified duty, specify any limitations / restrictions:  He will have limited use of his left arm for the next week.                                                                                                                                                                                                                                                                                                                                                                                                               X-Ray Findings:      From information given by the employee, together with medical evidence, can you directly connect this injury or occupational disease as job incurred?  []Yes   [] No Yes    Is additional medical care by a physician indicated? []Yes [] No  Yes    Do you know of any previous injury or disease contributing to this condition or occupational disease? []Yes [] No (Explain if yes)                          No   Date  12/6/2024 Print Health Care Provider’s Name  No name on file I certify that the employer’s copy of  this form was delivered to the employer on:   Address   [address] INSURER'S USE ONLY                       [place]   [address] Provider’s Tax ID Number   [id number]   Telephone  Dept: [phone]    Health Care Provider’s Original or Electronic Signature  [employer]  [employer] (MD,DO, DC,PA-C,APRN)  MD  Choose (if applicable)      ORIGINAL - TREATING HEALTHCARE PROVIDER PAGE 2 - INSURER/TPA PAGE 3 - EMPLOYER PAGE 4 - EMPLOYEE             Form C-4 (rev.08/23)

## 2024-12-09 ENCOUNTER — APPOINTMENT (OUTPATIENT)
Dept: URGENT CARE | Facility: PHYSICIAN GROUP | Age: 49
End: 2024-12-09
Payer: COMMERCIAL

## 2024-12-17 ENCOUNTER — OCCUPATIONAL MEDICINE (OUTPATIENT)
Dept: OCCUPATIONAL MEDICINE | Facility: CLINIC | Age: 49
End: 2024-12-17
Payer: COMMERCIAL

## 2024-12-17 VITALS
DIASTOLIC BLOOD PRESSURE: 98 MMHG | WEIGHT: 250.2 LBS | HEART RATE: 79 BPM | OXYGEN SATURATION: 93 % | BODY MASS INDEX: 35.03 KG/M2 | TEMPERATURE: 97.7 F | SYSTOLIC BLOOD PRESSURE: 130 MMHG | HEIGHT: 71 IN

## 2024-12-17 DIAGNOSIS — M54.12 CERVICAL RADICULOPATHY: ICD-10-CM

## 2024-12-17 DIAGNOSIS — S46.912D STRAIN OF LEFT SHOULDER, SUBSEQUENT ENCOUNTER: ICD-10-CM

## 2024-12-17 PROCEDURE — 99212 OFFICE O/P EST SF 10 MIN: CPT | Performed by: PREVENTIVE MEDICINE

## 2024-12-17 PROCEDURE — 3080F DIAST BP >= 90 MM HG: CPT | Performed by: PREVENTIVE MEDICINE

## 2024-12-17 PROCEDURE — 1125F AMNT PAIN NOTED PAIN PRSNT: CPT | Performed by: PREVENTIVE MEDICINE

## 2024-12-17 PROCEDURE — 3075F SYST BP GE 130 - 139MM HG: CPT | Performed by: PREVENTIVE MEDICINE

## 2024-12-17 ASSESSMENT — PAIN SCALES - GENERAL: PAINLEVEL_OUTOF10: 2=MINIMAL-SLIGHT

## 2024-12-17 ASSESSMENT — FIBROSIS 4 INDEX: FIB4 SCORE: 0.89

## 2024-12-17 NOTE — PROGRESS NOTES
"Subjective:     Giuseppe Swift is a 49 y.o. male who presents for Follow-Up ( DOI: 12-5-24 - l upper quadrant - exam rm 17 - b)    NAREN: He was handling a pallet when it was struck by another forklift causing force to be transmitted to the left upper extremity and into the neck.  He was seen in the ER, advised work restrictions, NSAIDs and muscle relaxers.    12/17/2024: Patient states overall symptoms are much improved.  He states he has minimal to no pain at this point.  Has not had a taking medications the last day or 2.  Feels ready to be released to full duty.      ROS: All systems were reviewed on intake form, form was reviewed and signed. See scanned documents in media. Pertinent positives and negatives included in HPI.    PMH: No pertinent past medical history to this problem  MEDS: Medications were reviewed in Epic  ALLERGIES:   Allergies   Allergen Reactions    Iodine Hives, Rash and Swelling     Topical - rash  IV contrast - full body hives and swelling    Lisinopril Cough    Pineapple Itching and Swelling     Tongue swelling and itchy throat    Shellfish Allergy Anaphylaxis    Biofreeze Rash     Rash and blisters    Cat Hair Extract Hives, Shortness of Breath, Rash, Runny Nose, Itching, Swelling and Anxiety          Latex Rash          Atorvastatin      Myaglias    Mushroom Extract Complex Swelling     Swelling and mouth numbness      SOCHX: Works as a  at [employer]  FH: No pertinent family history to this problem       Objective:     BP (!) 130/98 (BP Location: Left arm, Patient Position: Sitting, BP Cuff Size: Large adult)   Pulse 79   Temp 36.5 °C (97.7 °F) (Temporal)   Ht 1.791 m (5' 10.5\")   Wt 113 kg (250 lb 3.2 oz)   SpO2 93%   BMI 35.39 kg/m²     Constitutional: Patient is in no acute distress. Appears well-developed and well-nourished.   Cardiovascular: Normal rate.    Pulmonary/Chest: Effort normal. No respiratory distress.   Neurological: Patient is alert and " oriented to person, place, and time.   Skin: Skin is warm and dry.   Psychiatric: Normal mood and affect. Behavior is normal.     Musculoskeletal: No gross deformity.  Full range of motion of upper extremities.  Upper extremity strength intact.  Cervical range of motion intact.  No tenderness.    Assessment/Plan:     1. Cervical radiculopathy    2. Strain of left shoulder, subsequent encounter      Symptoms mostly improved at this point.  Ready for full duty  Released from care  Full duty  Follow-up as needed    Work Status: Full Duty - see D-39 or other state/federal worker's compensation forms for specific restrictions if applicable  Follow up as needed    Differential diagnosis, natural history, supportive care, and indications for immediate follow-up discussed.

## 2024-12-17 NOTE — LETTER
PHYSICIAN’S AND CHIROPRACTIC PHYSICIAN'S   PROGRESS REPORT   CERTIFICATION OF DISABILITY Claim Number:     Social Security Number:    Patient’s Name: Giuseppe Swift Date of Injury: 12/5/2024   Employer: [employer] Name of MCO (if applicable):      Patient’s Job Description/Occupation: [employer]       Previous Injuries/Diseases/Surgeries Contributing to the Condition:         Diagnosis: (M54.12) Cervical radiculopathy  (S46.912D) Strain of left shoulder, subsequent encounter      Related to the Industrial Injury? Yes     Explain:        Objective Medical Findings: Musculoskeletal: No gross deformity.  Full range of motion of upper extremities.  Upper extremity strength intact.  Cervical range of motion intact.  No tenderness.      X   None - Discharged                         Stable  Yes                 Ratable  No     X   Generally Improved                         Condition Worsened                  Condition Same  May Have Suffered a Permanent Disability No     Treatment Plan:    Symptoms mostly improved at this point.  Ready for full duty  Released from care  Full duty  Follow-up as needed         No Change in Therapy                  PT/OT Prescribed                      Medication May be Used While Working        Case Management                          PT/OT Discontinued    Consultation    Further Diagnostic Studies:    Prescription(s)               X  Released to FULL DUTY /No Restrictions on (Date):  12/17/2024    Certified TOTALLY TEMPORARILY DISABLED (Indicate Dates) From:   To:      Released to RESTRICTED/Modified Duty on (Date): From:   To:    Restrictions Are:         No Sitting    No Standing    No Pulling Other:         No Bending at Waist     No Stooping     No Lifting        No Carrying     No Walking Lifting Restricted to (lbs.):          No Pushing        No Climbing     No Reaching Above Shoulders       Date of Next Visit:    Date of this Exam: 12/17/2024 Physician/Chiropractic  Physician Name: Nicolas Ibrahim D.O. Physician/Chiropractic Physician Signature:  Nicolas Ibrahim DO MPH     [place]:  [facility]  [facility], [address] [place], [place] [address] - Telephone [phone] [place]:  [address] [place] [address], [address] [place], [place] [address] - Telephone [phone]    [email or web address]  D-39 (Rev. 10/24)

## 2024-12-26 ENCOUNTER — HOSPITAL ENCOUNTER (OUTPATIENT)
Dept: LAB | Facility: MEDICAL CENTER | Age: 49
End: 2024-12-26
Attending: FAMILY MEDICINE
Payer: COMMERCIAL

## 2024-12-26 ENCOUNTER — HOSPITAL ENCOUNTER (OUTPATIENT)
Dept: LAB | Facility: MEDICAL CENTER | Age: 49
End: 2024-12-26
Attending: STUDENT IN AN ORGANIZED HEALTH CARE EDUCATION/TRAINING PROGRAM
Payer: COMMERCIAL

## 2024-12-26 DIAGNOSIS — E03.9 ACQUIRED HYPOTHYROIDISM: ICD-10-CM

## 2024-12-26 DIAGNOSIS — R74.8 ELEVATED CPK: ICD-10-CM

## 2024-12-26 LAB
CK SERPL-CCNC: 186 U/L (ref 0–154)
T4 FREE SERPL-MCNC: 1.02 NG/DL (ref 0.93–1.7)
TSH SERPL DL<=0.005 MIU/L-ACNC: 5.66 UIU/ML (ref 0.38–5.33)

## 2024-12-26 PROCEDURE — 36415 COLL VENOUS BLD VENIPUNCTURE: CPT

## 2024-12-26 PROCEDURE — 84443 ASSAY THYROID STIM HORMONE: CPT

## 2024-12-26 PROCEDURE — 82550 ASSAY OF CK (CPK): CPT

## 2024-12-26 PROCEDURE — 84439 ASSAY OF FREE THYROXINE: CPT

## 2024-12-26 RX ORDER — SUCRALFATE 1 G/1
1 TABLET ORAL 2 TIMES DAILY
Qty: 60 TABLET | Refills: 0 | Status: SHIPPED | OUTPATIENT
Start: 2024-12-26

## 2024-12-27 ENCOUNTER — PATIENT MESSAGE (OUTPATIENT)
Dept: CARDIOLOGY | Facility: MEDICAL CENTER | Age: 49
End: 2024-12-27
Payer: COMMERCIAL

## 2024-12-27 DIAGNOSIS — I25.10 CORONARY ARTERY DISEASE INVOLVING NATIVE CORONARY ARTERY OF NATIVE HEART WITHOUT ANGINA PECTORIS: ICD-10-CM

## 2024-12-27 DIAGNOSIS — E03.9 ACQUIRED HYPOTHYROIDISM: ICD-10-CM

## 2024-12-27 DIAGNOSIS — R74.8 ELEVATED CK: ICD-10-CM

## 2024-12-27 DIAGNOSIS — E78.00 PURE HYPERCHOLESTEROLEMIA: ICD-10-CM

## 2024-12-27 RX ORDER — LEVOTHYROXINE SODIUM 112 UG/1
112 TABLET ORAL
Qty: 90 TABLET | Refills: 3 | Status: SHIPPED | OUTPATIENT
Start: 2024-12-27

## 2024-12-27 NOTE — TELEPHONE ENCOUNTER
Received request via: Pharmacy    Was the patient seen in the last year in this department? Yes    Does the patient have an active prescription (recently filled or refills available) for medication(s) requested? No    Pharmacy Name: [facility] [address] [place], [place] [address] [facility]     Does the patient have half-way Plus and need 100-day supply? (This applies to ALL medications) Patient does not have SCP

## 2024-12-27 NOTE — PROGRESS NOTES
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  Thank you.

## 2024-12-30 ENCOUNTER — OFFICE VISIT (OUTPATIENT)
Dept: MEDICAL GROUP | Facility: PHYSICIAN GROUP | Age: 49
End: 2024-12-30
Payer: COMMERCIAL

## 2024-12-30 VITALS — HEIGHT: 71 IN | RESPIRATION RATE: 13 BRPM | BODY MASS INDEX: 34.88 KG/M2 | WEIGHT: 249.12 LBS

## 2024-12-30 DIAGNOSIS — E11.29 TYPE 2 DIABETES MELLITUS WITH DIABETIC MICROALBUMINURIA, WITHOUT LONG-TERM CURRENT USE OF INSULIN (HCC): ICD-10-CM

## 2024-12-30 DIAGNOSIS — E78.00 PURE HYPERCHOLESTEROLEMIA: ICD-10-CM

## 2024-12-30 DIAGNOSIS — R80.9 TYPE 2 DIABETES MELLITUS WITH DIABETIC MICROALBUMINURIA, WITHOUT LONG-TERM CURRENT USE OF INSULIN (HCC): ICD-10-CM

## 2024-12-30 RX ORDER — TIRZEPATIDE 2.5 MG/.5ML
1 INJECTION, SOLUTION SUBCUTANEOUS
Qty: 2 ML | Refills: 0 | Status: SHIPPED | OUTPATIENT
Start: 2024-12-30

## 2024-12-30 RX ORDER — EVOLOCUMAB 140 MG/ML
140 INJECTION, SOLUTION SUBCUTANEOUS
Qty: 2.1 EACH | Refills: 12 | Status: SHIPPED | OUTPATIENT
Start: 2024-12-30

## 2024-12-30 ASSESSMENT — FIBROSIS 4 INDEX: FIB4 SCORE: 0.89

## 2024-12-30 NOTE — PROGRESS NOTES
[facility] - Initial Visit    Time IN: 1:45pm  Time OUT: 2:25pm    Giuseppe Swift presents for management of dyslipidemia    Referral Source: Cardiologist, Dr Gee    Date Referral Placed: 11-4-24    Date First Seen in Clinic: 12-30-24    PERTINENT HLD PMHX  Age at Initial Diagnosis of Dyslipidemia: 43      Baseline Lipids Prior to Treatment:    **patient unsure if this data reflects time when he was on statin therapy as he was following with outside provider**    Latest Reference Range & Units Most Recent   Cholesterol,Tot 100 - 199 mg/dL 179  5/17/24 09:33   Triglycerides 0 - 149 mg/dL 86  5/17/24 09:33   HDL >=40 mg/dL 41  5/17/24 09:33   LDL <100 mg/dL 121 (H)  5/17/24 09:33       History of ASCVD: History of prior MI >12 months ago:    Cardiology note 11-4-24:  Had NSTEMI October 3, 2018 in [facility] 95% prox LAD that was treated with a 4.5 x 18 mm drug-eluting stent.     Previously Attempted Interventions for Lipids - including outcome  Statin: Rosuvastatin, Atorvastatin, Pravastatin    Outcome: other Highly elevated CPK levels with associated myalgias with rosuvastatin and atorvastatin.  Max dose pravastatin did not provide effective lipid lowering per patient.  Non-Statin: none  Outcome: not applicable    Secondary causes/contributors (report to medical director if present):   Endocrine/Hypothyroidism:  none reported   Liver disease: none reported   Renal disease/nephrotic syndrome:  none reported  Dietary-induced (ketogenic, lean mass hyper-responder)? no  Medications: Testosterone     CURRENT MED MGMT  Current Lipid Lowering Meds:   Statin: None  Non-Statin: None  Supplements: None  Current adverse drug reactions/side effects? N\A  Is Adherence an Issue? N\A    Any Family History Cardiovascular Disease? No, but he has no knowledge of paternal family hx.      There were no vitals filed for this visit.   BMI Readings from Last 1 Encounters:   12/17/24 35.39 kg/m²      Wt Readings from  "Last 3 Encounters:   12/17/24 113 kg (250 lb 3.2 oz)   12/06/24 115 kg (253 lb 8.5 oz)   11/04/24 114 kg (251 lb)     BP Readings from Last 2 Encounters:   12/17/24 (!) 130/98   12/06/24 (!) 151/89       DATA REVIEW:  Most Recent Lipid Panel:   Lab Results   Component Value Date/Time    CHOLSTRLTOT 179 05/17/2024 09:33 AM     (H) 05/17/2024 09:33 AM    HDL 41 05/17/2024 09:33 AM    TRIGLYCERIDE 86 05/17/2024 09:33 AM     Lab Results   Component Value Date/Time     (H) 05/17/2024 09:33 AM    LDL 99 10/24/2023 09:18 AM    LDL 84 08/14/2020 09:35 AM      No results found for: \"LDLCALC\"   No results found for: \"LIPOPROTA\"   No results found for: \"APOB\"   No results found for: \"CRPHIGHSEN\"    Other Pertinent Blood Work:   Lab Results   Component Value Date    SODIUM 139 05/17/2024    POTASSIUM 4.6 05/17/2024    CHLORIDE 101 05/17/2024    CO2 27 05/17/2024    ANION 11.0 05/17/2024    GLUCOSE 117 (H) 05/17/2024    BUN 18 05/17/2024    CREATININE 1.10 05/17/2024    CALCIUM 9.4 05/17/2024    ASTSGOT 22 05/17/2024    ALTSGPT 34 05/17/2024    ALKPHOSPHAT 78 05/17/2024    TBILIRUBIN 0.4 05/17/2024    ALBUMIN 4.3 05/17/2024    AGRATIO 1.8 05/17/2024    TSHULTRASEN 5.660 (H) 12/26/2024    CPKTOTAL 186 (H) 12/26/2024       VASCULAR IMAGING:  CAC Score (if available):  None  CIMT/Vascular screen (if available):  None  Other (if applicable):  None    ASSESSMENT AND PLAN    Patient Type, check all that apply: Secondary Prevention    Major ASCVD events: History of prior MI >12 months ago: NSTEMI 10/2028 with placement of GARRET    Evidence of genetic dyslipidemia (Familial Hyperlipidemia): No     ASCVD risk calculations (if applicable)  N/A    ACC/AHA Indication for Statin Therapy:  Established ASCVD: Indication for High intensity statin     Other Significant Risk Markers:  High-risk conditions: Diabetes   Risk-enhancers: None  Lipoprotein(a): Ordered this visit  Most recent CAC percentile: N/A    Lipid Goals:  Primary: " LDL-C <70 mg/dl  Secondary apoB <70 mg/dl  At goals? no    LIFESTYLE INTERVENTIONS  TOBACCO: Nonsmoker  Continued complete avoidance of all tobacco products   EtOH: One serving ~once per month  Men: No more than two standard servings per day  Women: No more than one standard serving per day  PHYSICAL ACTIVITY:  Previously training resistance and cardio on regular basis.  With elevated CPK and myalgias, has not trained in several months.  Frustrating for patient as he feels better when actively training.  He would like to get back to frequent gym work and cardio work.  NUTRITION: Somewhat low-carb/low-fat nutrition plan.  See pharmacotherapy diabetes note in regards to nutrition.    LIPID-LOWERING MEDICATION MANAGEMENT:     Statin Therapy:   Would not rechallenge at this time due to recent elevations in CPK (>3x upper limit of normal) with rosuvastatin use as well as hx of myalgia secondary to atorvastatin use.  As above, did not reach lipid goals with use of max dose pravastatin.    Non-Statin Meds:   EZETIMIBE: Continue 10 mg once daily  NEXLETOL/NEXLIZET (avoid simva >20, prava >40): Not currently indicated - would likely not achieve lipid goals of LDL-C <70 mg/dL.  BAS: Not currently indicated    OMEGA-3 FAs: Not currently indicated  FIBRATE:  Not currently indicated  PCSK9 mAb: Start Repatha 140 mg subQ Q14D  Indication: ASCVD with suboptimal control of LDL-C despite maximally tolerated statin     Patient seen today for initial visit.  Hx of multiple intolerances to statin therapy, most recently with CPK elevations >3x upper limit of normal with associated, lingering myalgias.  Discussed use of Nexletol, but unlikely to bring patient to goal given possible lipid baseline.  Patient is wanting to be as proactive about health as possible, therefore, would like to pursue therapy with PCSK9i therapy.  Prescription sent to preferred pharmacy, will alert Rx Coordinators to make sure coverage taken care  of.    Recommended Supplements: None     Studies Ordered at Todays Visit: None   Blood Work To Be Obtained Prior to Next Visit: Lipid panel, CMP, Lp(a), and ApoB  Follow-Up: 4 weeks    Weston Manriquez, PharmD, BCACP    CC:  Carl F. Thomas III, M.D. Laura Sullivan, M.D. Michael Bloch, M.D.

## 2024-12-30 NOTE — PROGRESS NOTES
Patient Consult Note - Initial Visit    TIME IN: 1:45pm  TIME OUT: 2:25pm    Primary care physician: Manjeet Moyer III, M.D.    Reason for consult: Management of Uncontrolled Type 2 Diabetes    HPI:  Giuseppe Swift is a 49 y.o. old patient who comes in today for evaluation of above stated problem.    Most Recent HbA1c:   Lab Results   Component Value Date/Time    HBA1C 7.6 (H) 10/21/2024 12:00 AM      Lab Results   Component Value Date/Time    CREATININE 1.10 05/17/2024 09:33 AM    CREATININE 1.0 01/26/2006 01:23 PM      Reliable and Exact Care Diabetes Score    Displays the Diabetes REC Score.  A patient gets 1 point for each measure for a maximum of 7 points   1  Measures Not Met      Factor Value   REC DM SCORE - LDL has been performed in the last year and is below 100 Not Met   - LDL calculated 121 (5/17/2024)                   Diabetes Medication History and Current Regimen  Sulfonylurea: Glimepiride 4mg BID - ran out recently, did not refill in anticipation of this visit.    Previously attempted medications  Rybelsus - stopped by previous PCP outside of [facility]  Metformin - GI distress    Pt has home glucometer and proper testing technique - Yes    Pt reports blood sugars: not testing too consistently at this time  Before Breakfast: 109-138  Post prandial: 150-240    Hypoglycemia awareness - Yes  Nocturnal hypoglycemia- None  Hypoglycemia:  None    Pt's treatment of Hypoglycemia - 15:15 Rule    Lifestyle:  Current Exercise - None at this time as directed by cardiology  Exercise Goal - Would highly benefit from at least 150-180 min/week moderate intensity exercise.  Previous hx of resistance training on regular basis along with cardio work.  With recent CPK elevations and associated myalgias, has been instructed to holding off on exercise until levels return to baseline.    Nutrition -   Has good understanding of nutrition associated with diabetes and cardiovascular health.  Patient states that he  "does not necessarily \"cut anything out\" as he look for good mix of macronutrients.  He states that he and wife focus on one true meal per day, but admits that he can get into a good deal of snacking throughout the day.  Does admit to chips, crackers, good deal of fruits, yogurts as part of snacking.  Portions seem to be under good control.    Preventative Management  BP regimen (ACEi/ARB): Losartan 50mg QD  BP <140/90: Yes  Statin:  None, see associated lipid note from today  LDL <100: No  Lab Results   Component Value Date/Time    CHOLSTRLTOT 179 05/17/2024 09:33 AM     (H) 05/17/2024 09:33 AM    HDL 41 05/17/2024 09:33 AM    TRIGLYCERIDE 86 05/17/2024 09:33 AM       Last Microalbumin/Cr:  Lab Results   Component Value Date/Time    MALBCRT 136 (H) 05/03/2024 09:11 AM    MICROALBUR 18.1 05/03/2024 09:11 AM     Last A1c:  Lab Results   Component Value Date/Time    HBA1C 7.6 (H) 10/21/2024 12:00 AM        Monofilament exam: will need to conduct at next visit     REC DM Score: 1  Care gaps addressed:   LDL is above 100: Optimized lipid medications/management  Care gaps updated in Health Maintenance      Past Medical History:  Patient Active Problem List    Diagnosis Date Noted    Seasonal allergic rhinitis due to pollen 05/07/2024    Low testosterone in male 05/07/2024    Wellness examination 10/24/2023    Chest pain 09/15/2022    Chronic fatigue 09/28/2021    Stress 01/25/2021    Acquired hypothyroidism 09/11/2019    Type 2 diabetes mellitus with diabetic microalbuminuria, without long-term current use of insulin ([facility]) 09/11/2019    Obesity (BMI 30-39.9) 08/16/2019    GERD (gastroesophageal reflux disease) 08/16/2019    Coronary artery disease involving native coronary artery of native heart without angina pectoris 07/11/2019    Pure hypercholesterolemia 07/11/2019    Low HDL (under 40) 07/11/2019    Primary hypertension 07/11/2019       Past Surgical History:  Past Surgical History:   Procedure Laterality Date "    DE UPPER GI ENDOSCOPY,DIAGNOSIS  2/22/2021    Procedure: GASTROSCOPY;  Surgeon: Jcarlos Thomas M.D.;  Location: SURGERY [facility];  Service: EUS    EGD W/ENDOSCOPIC ULTRASOUND  2/22/2021    Procedure: EGD, WITH ENDOSCOPIC US - UPPER RADIAL LINEAR;  Surgeon: Jcarlos Thomas M.D.;  Location: SURGERY [facility];  Service: EUS    VASECTOMY  2007    FUSION, SPINE, LUMBAR, PLIF  2004    L4-S1       Allergies:  Iodine, Lisinopril, Pineapple, Shellfish allergy, Biofreeze, Cat hair extract, Latex, Atorvastatin, and Mushroom extract complex    Social History:  Social History     Socioeconomic History    Marital status:      Spouse name: Not on file    Number of children: Not on file    Years of education: Not on file    Highest education level: Associate degree: occupational, technical, or vocational program   Occupational History    Not on file   Tobacco Use    Smoking status: Never    Smokeless tobacco: Never   Vaping Use    Vaping status: Never Used   Substance and Sexual Activity    Alcohol use: Not Currently     Comment: occasional, rare    Drug use: No    Sexual activity: Yes     Partners: Female     Birth control/protection: Surgical   Other Topics Concern    Not on file   Social History Narrative    Not on file     Social Drivers of Health     Financial Resource Strain: Low Risk  (1/25/2021)    Overall Financial Resource Strain (CARDIA)     Difficulty of Paying Living Expenses: Not hard at all   Food Insecurity: No Food Insecurity (1/25/2021)    Hunger Vital Sign     Worried About Running Out of Food in the Last Year: Never true     Ran Out of Food in the Last Year: Never true   Transportation Needs: No Transportation Needs (1/25/2021)    PRAPARE - Transportation     Lack of Transportation (Medical): No     Lack of Transportation (Non-Medical): No   Physical Activity: Sufficiently Active (1/25/2021)    Exercise Vital Sign     Days of Exercise per Week: 4 days     Minutes of Exercise per Session: 120 min  "  Stress: Stress Concern Present (1/25/2021)    [employer] - Occupational Stress Questionnaire     Feeling of Stress : To some extent   Social Connections: Moderately Integrated (1/25/2021)    Social Connection and Isolation Panel [NHANES]     Frequency of Communication with Friends and Family: More than three times a week     Frequency of Social Gatherings with Friends and Family: Once a week     Attends Bahai Services: More than 4 times per year     Active Member of Clubs or Organizations: Yes     Attends Club or Organization Meetings: More than 4 times per year     Marital Status:    Intimate Partner Violence: Not on file   Housing Stability: Unknown (1/25/2021)    Housing Stability Vital Sign     Unable to Pay for Housing in the Last Year: No     Number of Places Lived in the Last Year: Not on file     Unstable Housing in the Last Year: No       Family History:  Family History   Problem Relation Age of Onset    Diabetes Mother     No Known Problems Father        Medications:    Current Outpatient Medications:     levothyroxine (SYNTHROID) 112 MCG Tab, Take 1 Tablet by mouth every morning on an empty stomach., Disp: 90 Tablet, Rfl: 3    sucralfate (CARAFATE) 1 GM Tab, Take 1 tablet by mouth twice daily, Disp: 60 Tablet, Rfl: 0    glimepiride (AMARYL) 4 MG Tab, Take 1 Tablet by mouth 2 times a day., Disp: , Rfl:     testosterone cypionate (DEPO-TESTOSTERONE) 200 MG/ML injection, INJECT 1 ML INTRAMUSCULARLY INTO THE SHOULDER, THIGH OR BUTTOCKS ONCE EVERY 2 WEEKS.  For 90 days, Disp: , Rfl:     nitroglycerin (NITROSTAT) 0.4 MG SL Tab, Place 1 Tablet under the tongue as needed for Chest Pain., Disp: 25 Tablet, Rfl: 11    ezetimibe (ZETIA) 10 MG Tab, Take 10 mg by mouth every day., Disp: , Rfl:     Syringe/Needle, Disp, (SYRINGE 3CC/23GX1\") 23G X 1\" 3 ML Misc, Use with testosterone every 2 weeks, Disp: 6 Each, Rfl: 3    glucose blood strip, 1 Strip by Other route every day., " Disp: 100 Strip, Rfl: 3    EPINEPHrine (EPIPEN) 0.3 MG/0.3ML Solution Auto-injector solution for injection, Inject 0.3 mL into the thigh one time for 1 dose, Disp: 2 Each, Rfl: 2    lansoprazole (PREVACID) 30 MG CAPSULE DELAYED RELEASE, Take 1 Capsule by mouth every day., Disp: 90 Capsule, Rfl: 3    aspirin EC (ECOTRIN) 81 MG Tablet Delayed Response, Take 162 mg by mouth every morning. 2 tablets = 162 mg., Disp: , Rfl:     losartan (COZAAR) 50 MG Tab, Take 1 Tablet by mouth every day., Disp: 90 Tablet, Rfl: 7    fexofenadine (ALLEGRA) 180 MG tablet, Take 180 mg by mouth every day., Disp: , Rfl:     Labs: Reviewed    Physical Examination:  Vital signs: There were no vitals taken for this visit. There is no height or weight on file to calculate BMI.    Assessment and Plan:    1. DM2  Patient seen today for initial visit, referred by cardiology.  Diagnosed with diabetes shortly after NSTEMI in 2008.  Family hx limited to mother who is well controlled diabetic.  He is unsure of any paternal family hx.  Had taken Rybelsus in the past and found success with weight loss and glucose control, but was discontinued after dose escalation caused severe nausea.  Also being seen today in regards to lipids.    Basic physiology of DMII was explained to patient as well as microvascular/macrovascular complications. The importance of increasing physical activity to improve diabetes control was discussed with the patient. Patient was also educated on changing diet and making better choices to help control blood sugar.  Discussed FBG goal of , 2hrPP <180, and A1c <7.0%.    Discussed current treatment modalities and rationale for use of metformin, insulin, SGLT2i, GLP1a, and GIPa.    - Medication changes -   START Mounjaro 2.5mg SQ once weekly.  STOP glimepiride.     - Lifestyle changes -   Nutrition:  Stressed importance of adequate lean protein intake, even if supplements required.  Decrease frequency of snacking and/or find low  carb/low sugar alternatives to current snacks.  Integrate more vegetables, decrease fruit intake.  Advised to keep portions under good control with introduction of GIPa.  Stay well hydrated.  Exercise:    Once cleared by cardiology, begin previous resistance and cardio routines.  Highly encouraged to get back to at least 150-180 min/week moderate intensity exercise.    Follow Up:  Four weeks.    Weston Manriquez, PharmDBCACP  12/30/24    CC:   Manjeet Moyer III, M.D.  Sawyer Rios M.D.                                                   [facility] Consent      Name    Giuseppe Swift    MRN number: [id number]    the following are guidelines for participation in the [facility].     I, ____Giuseppe Swift_____, understand and voluntarily agree to participate in the [facility] and to have services provided to me by pharmacists working in collaboration with my provider.    I understand the pharmacist within the [facility] may initiate, modify or discontinue my medications, order appropriate testing and appointments, perform exams, monitor treatment, and make clinical evaluations and decisions pursuant to a collaborative practice agreement with my provider.  I understand the pharmacist within the [facility] is not a physician, osteopathic physician, advanced practice registered nurse or physician assistant and may not diagnose.  I will take all my medications as instructed and not change the way I take it without first talking to my provider or a pharmacist within the [facility].  I understand that if I am late to my appointment I may not be able to be seen by a pharmacist at that time and will have to reschedule my appointment.  During appointment with pharmacist I understand that pharmacist has the right not to answer questions or perform  services outside the pharmacist’s scope of practice.  By signing below, I provide informed consent for the pharmacist to provide these services and for my participation in the [facility].      Giuseppe Swift           [id number]          12/30/24  Patient Name                   MRN number  Date     ___X_Obtained verbal consent from pt, No signature due to COVID concerns___   Patient Signature

## 2025-01-02 ENCOUNTER — PATIENT MESSAGE (OUTPATIENT)
Dept: CARDIOLOGY | Facility: MEDICAL CENTER | Age: 50
End: 2025-01-02
Payer: COMMERCIAL

## 2025-01-03 ENCOUNTER — PATIENT MESSAGE (OUTPATIENT)
Dept: CARDIOLOGY | Facility: MEDICAL CENTER | Age: 50
End: 2025-01-03
Payer: COMMERCIAL

## 2025-01-13 DIAGNOSIS — K21.9 GASTROESOPHAGEAL REFLUX DISEASE: ICD-10-CM

## 2025-01-14 RX ORDER — LANSOPRAZOLE 30 MG/1
30 CAPSULE, DELAYED RELEASE ORAL DAILY
Qty: 90 CAPSULE | Refills: 2 | Status: SHIPPED | OUTPATIENT
Start: 2025-01-14

## 2025-01-14 NOTE — TELEPHONE ENCOUNTER
Received request via: Patient    Was the patient seen in the last year in this department? Yes    Does the patient have an active prescription (recently filled or refills available) for medication(s) requested? No    Pharmacy Name: [facility] [address] [place], [place] [address] [facility]     Does the patient have group home Plus and need 100-day supply? (This applies to ALL medications) Patient does not have SCP

## 2025-01-15 ENCOUNTER — TELEPHONE (OUTPATIENT)
Dept: HEALTH INFORMATION MANAGEMENT | Facility: OTHER | Age: 50
End: 2025-01-15
Payer: COMMERCIAL

## 2025-01-15 ENCOUNTER — PATIENT MESSAGE (OUTPATIENT)
Dept: VASCULAR LAB | Facility: MEDICAL CENTER | Age: 50
End: 2025-01-15

## 2025-01-15 DIAGNOSIS — E11.29 TYPE 2 DIABETES MELLITUS WITH DIABETIC MICROALBUMINURIA, WITHOUT LONG-TERM CURRENT USE OF INSULIN (HCC): Primary | ICD-10-CM

## 2025-01-15 DIAGNOSIS — R80.9 TYPE 2 DIABETES MELLITUS WITH DIABETIC MICROALBUMINURIA, WITHOUT LONG-TERM CURRENT USE OF INSULIN (HCC): Primary | ICD-10-CM

## 2025-01-17 ENCOUNTER — DOCUMENTATION (OUTPATIENT)
Dept: MEDICAL GROUP | Facility: PHYSICIAN GROUP | Age: 50
End: 2025-01-17
Payer: COMMERCIAL

## 2025-01-17 NOTE — PROGRESS NOTES
Confirmed with patient via [employer] that he was able to  both Repatha and Mounjaro and has been dosing for several weeks.  Please see MCMs in encounters for details.  Weston Manriquez, PharmD, BCACP

## 2025-01-18 ENCOUNTER — APPOINTMENT (OUTPATIENT)
Dept: RADIOLOGY | Facility: MEDICAL CENTER | Age: 50
End: 2025-01-18
Attending: EMERGENCY MEDICINE
Payer: COMMERCIAL

## 2025-01-18 ENCOUNTER — HOSPITAL ENCOUNTER (EMERGENCY)
Facility: MEDICAL CENTER | Age: 50
End: 2025-01-18
Attending: EMERGENCY MEDICINE
Payer: COMMERCIAL

## 2025-01-18 ENCOUNTER — OFFICE VISIT (OUTPATIENT)
Dept: URGENT CARE | Facility: PHYSICIAN GROUP | Age: 50
End: 2025-01-18
Payer: COMMERCIAL

## 2025-01-18 VITALS
DIASTOLIC BLOOD PRESSURE: 84 MMHG | WEIGHT: 249.12 LBS | SYSTOLIC BLOOD PRESSURE: 137 MMHG | HEIGHT: 70 IN | OXYGEN SATURATION: 93 % | RESPIRATION RATE: 18 BRPM | TEMPERATURE: 98 F | BODY MASS INDEX: 35.66 KG/M2 | HEART RATE: 70 BPM

## 2025-01-18 VITALS
OXYGEN SATURATION: 94 % | HEIGHT: 71 IN | SYSTOLIC BLOOD PRESSURE: 156 MMHG | TEMPERATURE: 97.2 F | WEIGHT: 249.12 LBS | HEART RATE: 77 BPM | BODY MASS INDEX: 34.88 KG/M2 | DIASTOLIC BLOOD PRESSURE: 88 MMHG | RESPIRATION RATE: 16 BRPM

## 2025-01-18 DIAGNOSIS — R11.0 NAUSEA: ICD-10-CM

## 2025-01-18 DIAGNOSIS — R03.0 ELEVATED BLOOD PRESSURE READING: ICD-10-CM

## 2025-01-18 DIAGNOSIS — B34.9 VIRAL ILLNESS: ICD-10-CM

## 2025-01-18 DIAGNOSIS — R10.30 LOWER ABDOMINAL PAIN: ICD-10-CM

## 2025-01-18 DIAGNOSIS — R19.7 DIARRHEA OF PRESUMED INFECTIOUS ORIGIN: ICD-10-CM

## 2025-01-18 DIAGNOSIS — R10.84 GENERALIZED ABDOMINAL PAIN: ICD-10-CM

## 2025-01-18 LAB
ALBUMIN SERPL BCP-MCNC: 4.6 G/DL (ref 3.2–4.9)
ALBUMIN/GLOB SERPL: 1.5 G/DL
ALP SERPL-CCNC: 69 U/L (ref 30–99)
ALT SERPL-CCNC: 47 U/L (ref 2–50)
ANION GAP SERPL CALC-SCNC: 11 MMOL/L (ref 7–16)
APPEARANCE UR: CLEAR
AST SERPL-CCNC: 31 U/L (ref 12–45)
BACTERIA #/AREA URNS HPF: NORMAL /HPF
BASOPHILS # BLD AUTO: 0.6 % (ref 0–1.8)
BASOPHILS # BLD: 0.06 K/UL (ref 0–0.12)
BILIRUB SERPL-MCNC: 0.5 MG/DL (ref 0.1–1.5)
BILIRUB UR QL STRIP.AUTO: NEGATIVE
BUN SERPL-MCNC: 10 MG/DL (ref 8–22)
CALCIUM ALBUM COR SERPL-MCNC: 9 MG/DL (ref 8.5–10.5)
CALCIUM SERPL-MCNC: 9.5 MG/DL (ref 8.4–10.2)
CASTS URNS QL MICRO: NORMAL /LPF (ref 0–2)
CHLORIDE SERPL-SCNC: 101 MMOL/L (ref 96–112)
CO2 SERPL-SCNC: 28 MMOL/L (ref 20–33)
COLOR UR: YELLOW
CREAT SERPL-MCNC: 1.06 MG/DL (ref 0.5–1.4)
EOSINOPHIL # BLD AUTO: 0.1 K/UL (ref 0–0.51)
EOSINOPHIL NFR BLD: 1 % (ref 0–6.9)
EPITHELIAL CELLS 1715: NORMAL /HPF (ref 0–5)
ERYTHROCYTE [DISTWIDTH] IN BLOOD BY AUTOMATED COUNT: 41 FL (ref 35.9–50)
GFR SERPLBLD CREATININE-BSD FMLA CKD-EPI: 86 ML/MIN/1.73 M 2
GLOBULIN SER CALC-MCNC: 3 G/DL (ref 1.9–3.5)
GLUCOSE SERPL-MCNC: 118 MG/DL (ref 65–99)
GLUCOSE UR STRIP.AUTO-MCNC: NEGATIVE MG/DL
HCT VFR BLD AUTO: 58.1 % (ref 42–52)
HGB BLD-MCNC: 19 G/DL (ref 14–18)
HYALINE CAST   1831: PRESENT /LPF
IMM GRANULOCYTES # BLD AUTO: 0.03 K/UL (ref 0–0.11)
IMM GRANULOCYTES NFR BLD AUTO: 0.3 % (ref 0–0.9)
KETONES UR STRIP.AUTO-MCNC: NEGATIVE MG/DL
LEUKOCYTE ESTERASE UR QL STRIP.AUTO: NEGATIVE
LIPASE SERPL-CCNC: 31 U/L (ref 11–82)
LYMPHOCYTES # BLD AUTO: 2.18 K/UL (ref 1–4.8)
LYMPHOCYTES NFR BLD: 22.5 % (ref 22–41)
MCH RBC QN AUTO: 27.6 PG (ref 27–33)
MCHC RBC AUTO-ENTMCNC: 32.6 G/DL (ref 32.3–36.5)
MCV RBC AUTO: 84.8 FL (ref 81.4–97.8)
MICRO URNS: ABNORMAL
MONOCYTES # BLD AUTO: 0.55 K/UL (ref 0–0.85)
MONOCYTES NFR BLD AUTO: 5.7 % (ref 0–13.4)
NEUTROPHILS # BLD AUTO: 6.76 K/UL (ref 1.82–7.42)
NEUTROPHILS NFR BLD: 69.9 % (ref 44–72)
NITRITE UR QL STRIP.AUTO: NEGATIVE
NRBC # BLD AUTO: 0 K/UL
NRBC BLD-RTO: 0 /100 WBC (ref 0–0.2)
PH UR STRIP.AUTO: 6 [PH] (ref 5–8)
PLATELET # BLD AUTO: 249 K/UL (ref 164–446)
PMV BLD AUTO: 9.9 FL (ref 9–12.9)
POTASSIUM SERPL-SCNC: 4.1 MMOL/L (ref 3.6–5.5)
PROT SERPL-MCNC: 7.6 G/DL (ref 6–8.2)
PROT UR QL STRIP: 100 MG/DL
RBC # BLD AUTO: 6.86 M/UL (ref 4.7–6.1)
RBC # URNS HPF: NORMAL /HPF
RBC UR QL AUTO: ABNORMAL
SODIUM SERPL-SCNC: 140 MMOL/L (ref 135–145)
SP GR UR STRIP.AUTO: >=1.03
WBC # BLD AUTO: 9.7 K/UL (ref 4.8–10.8)
WBC #/AREA URNS HPF: NORMAL /HPF

## 2025-01-18 PROCEDURE — 83690 ASSAY OF LIPASE: CPT

## 2025-01-18 PROCEDURE — 36415 COLL VENOUS BLD VENIPUNCTURE: CPT

## 2025-01-18 PROCEDURE — 99284 EMERGENCY DEPT VISIT MOD MDM: CPT

## 2025-01-18 PROCEDURE — 96374 THER/PROPH/DIAG INJ IV PUSH: CPT

## 2025-01-18 PROCEDURE — 74177 CT ABD & PELVIS W/CONTRAST: CPT

## 2025-01-18 PROCEDURE — 96376 TX/PRO/DX INJ SAME DRUG ADON: CPT

## 2025-01-18 PROCEDURE — 81001 URINALYSIS AUTO W/SCOPE: CPT

## 2025-01-18 PROCEDURE — 85025 COMPLETE CBC W/AUTO DIFF WBC: CPT

## 2025-01-18 PROCEDURE — 80053 COMPREHEN METABOLIC PANEL: CPT

## 2025-01-18 PROCEDURE — 96375 TX/PRO/DX INJ NEW DRUG ADDON: CPT

## 2025-01-18 PROCEDURE — 3079F DIAST BP 80-89 MM HG: CPT | Performed by: NURSE PRACTITIONER

## 2025-01-18 PROCEDURE — 700111 HCHG RX REV CODE 636 W/ 250 OVERRIDE (IP): Mod: JZ | Performed by: EMERGENCY MEDICINE

## 2025-01-18 PROCEDURE — 3077F SYST BP >= 140 MM HG: CPT | Performed by: NURSE PRACTITIONER

## 2025-01-18 PROCEDURE — 99214 OFFICE O/P EST MOD 30 MIN: CPT | Performed by: NURSE PRACTITIONER

## 2025-01-18 PROCEDURE — 700117 HCHG RX CONTRAST REV CODE 255: Performed by: EMERGENCY MEDICINE

## 2025-01-18 RX ORDER — DIPHENHYDRAMINE HYDROCHLORIDE 50 MG/ML
50 INJECTION INTRAMUSCULAR; INTRAVENOUS ONCE
Status: COMPLETED | OUTPATIENT
Start: 2025-01-18 | End: 2025-01-18

## 2025-01-18 RX ORDER — CALCIUM CARBONATE 300MG(750)
3000-3750 TABLET,CHEWABLE ORAL
COMMUNITY

## 2025-01-18 RX ORDER — HYDROCORTISONE SODIUM SUCCINATE 100 MG/2ML
200 INJECTION INTRAMUSCULAR; INTRAVENOUS ONCE
Status: COMPLETED | OUTPATIENT
Start: 2025-01-18 | End: 2025-01-18

## 2025-01-18 RX ORDER — ONDANSETRON 4 MG/1
4 TABLET, ORALLY DISINTEGRATING ORAL EVERY 8 HOURS PRN
Qty: 8 TABLET | Refills: 0 | Status: SHIPPED | OUTPATIENT
Start: 2025-01-18

## 2025-01-18 RX ORDER — DICYCLOMINE HCL 20 MG
20 TABLET ORAL EVERY 6 HOURS PRN
Qty: 12 TABLET | Refills: 0 | Status: SHIPPED | OUTPATIENT
Start: 2025-01-18 | End: 2025-01-21

## 2025-01-18 RX ADMIN — DIPHENHYDRAMINE HYDROCHLORIDE 50 MG: 50 INJECTION, SOLUTION INTRAMUSCULAR; INTRAVENOUS at 19:30

## 2025-01-18 RX ADMIN — HYDROCORTISONE SODIUM SUCCINATE 200 MG: 100 INJECTION, POWDER, FOR SOLUTION INTRAMUSCULAR; INTRAVENOUS at 19:30

## 2025-01-18 RX ADMIN — HYDROCORTISONE SODIUM SUCCINATE 200 MG: 100 INJECTION, POWDER, FOR SOLUTION INTRAMUSCULAR; INTRAVENOUS at 15:43

## 2025-01-18 RX ADMIN — DIPHENHYDRAMINE HYDROCHLORIDE 50 MG: 50 INJECTION, SOLUTION INTRAMUSCULAR; INTRAVENOUS at 15:43

## 2025-01-18 RX ADMIN — IOHEXOL 100 ML: 350 INJECTION, SOLUTION INTRAVENOUS at 12:45

## 2025-01-18 ASSESSMENT — FIBROSIS 4 INDEX
FIB4 SCORE: 0.89
FIB4 SCORE: 0.89

## 2025-01-18 NOTE — ED PROVIDER NOTES
ED Provider Note    Scribed for Martir Maya M.D. by Martir Maya M.D.. 1/18/2025  3:06 PM    Primary care provider: Manjeet Moyer III, M.D.  Means of arrival: Patient brought in by his wife    CHIEF COMPLAINT  Chief Complaint   Patient presents with    Sent from Urgent Care     Was told he needs abd CT and lab work. They were unable to obtain labs at .     Abdominal Pain     Lower quadrants. Ongoing roughly 5d. Associated nausea, constipation, and diarrhea.   Describes pain as sharp and stabbing.        EXTERNAL RECORDS REVIEWED  Urgent care clinic note from today reviewed.  Patient saw a TeleDoc on the 16th for norovirus.  Patient presented with left-sided abdominal pain history of GERD.  Patient's been sick 1 week with nausea body aches and diarrhea.  Symptoms improved after the third day but still continue.  His wife had a similar illness.  No known history of diverticulitis.    Abdominal ultrasound from 2020 demonstrated hepatic steatosis.    TODD Swift is a 49 y.o. male who has history of high cholesterol and hypertension presents to the Emergency Department for evaluation of left lower quadrant abdominal pain onset Sunday night (6 days ago). He describes that his abdominal pain is concentrated to his left lower quadrant and notes that it radiates to his right lower quadrant. The patient reports that he suddenly developed severe abdominal cramping. He states that his abdominal pain was a pins and needle sensation followed by a burning sensation. The patient states he also has nausea, vomiting and diarrhea, but denies any fever. The patient reports having 15-25 episodes of diarrhea a day. The patient's pain and symptoms slightly improved after 3 days, however, he notes that his pain remains constant. The patient states that he is having soft stools, but denies then being watery. He also denies any blood in his stool. The patient denies any dysuria, hematuria, urinary frequency or urinary  "urgency. The wife does mention that she had similar symptoms, but notes that her symptoms have resolved.     LIMITATION TO HISTORY   None.    OUTSIDE HISTORIAN(S):  Wife at bedside to confirm sequence of events and collateral information provided. The wife does mention that she had similar symptoms, but notes that her symptoms have resolved.     REVIEW OF SYSTEMS  Pertinent positives include: lower abdominal pain, nausea, vomiting, diarrhea.  Pertinent negatives include: fevers, dysuria, hematuria, urinary frequency, urinary urgency.      PAST MEDICAL HISTORY  Past Medical History:   Diagnosis Date    Bowel habit changes 02/2021    constipation and diarrhea    Glaucoma     L eye    Heart burn     High cholesterol     Hypertension     Myocardial infarct (HCC)     Snoring     Tuberculosis 2008    no treatment, \"not active\"       FAMILY HISTORY  Family History   Problem Relation Age of Onset    Diabetes Mother     No Known Problems Father        SOCIAL HISTORY  Social History     Tobacco Use    Smoking status: Never    Smokeless tobacco: Never   Vaping Use    Vaping status: Never Used   Substance Use Topics    Alcohol use: Not Currently     Comment: occasional, rare    Drug use: No     Social History     Substance and Sexual Activity   Drug Use No       SURGICAL HISTORY  Past Surgical History:   Procedure Laterality Date    GA UPPER GI ENDOSCOPY,DIAGNOSIS  2/22/2021    Procedure: GASTROSCOPY;  Surgeon: Jcarlos Thomas M.D.;  Location: [facility];  Service: EUS    EGD W/ENDOSCOPIC ULTRASOUND  2/22/2021    Procedure: EGD, WITH ENDOSCOPIC US - UPPER RADIAL LINEAR;  Surgeon: Jcarlos Thomas M.D.;  Location: [facility];  Service: EUS    VASECTOMY  2007    FUSION, SPINE, LUMBAR, PLIF  2004    L4-S1       CURRENT MEDICATIONS  No current facility-administered medications for this encounter.    Current Outpatient Medications:     lansoprazole (PREVACID) 30 MG CAPSULE DELAYED RELEASE, Take 1 Capsule by mouth every " "day., Disp: 90 Capsule, Rfl: 2    Tirzepatide (MOUNJARO) 2.5 MG/0.5ML Solution Auto-injector, Inject 1 Pen under the skin every 7 days., Disp: 2 mL, Rfl: 0    Evolocumab (REPATHA SURECLICK) 140 MG/ML Solution Auto-injector SubQ injection pen, Inject 1 mL under the skin every 14 days., Disp: 2.1 Each, Rfl: 12    levothyroxine (SYNTHROID) 112 MCG Tab, Take 1 Tablet by mouth every morning on an empty stomach., Disp: 90 Tablet, Rfl: 3    sucralfate (CARAFATE) 1 GM Tab, Take 1 tablet by mouth twice daily, Disp: 60 Tablet, Rfl: 0    glimepiride (AMARYL) 4 MG Tab, Take 1 Tablet by mouth 2 times a day., Disp: , Rfl:     testosterone cypionate (DEPO-TESTOSTERONE) 200 MG/ML injection, INJECT 1 ML INTRAMUSCULARLY INTO THE SHOULDER, THIGH OR BUTTOCKS ONCE EVERY 2 WEEKS.  For 90 days, Disp: , Rfl:     nitroglycerin (NITROSTAT) 0.4 MG SL Tab, Place 1 Tablet under the tongue as needed for Chest Pain., Disp: 25 Tablet, Rfl: 11    ezetimibe (ZETIA) 10 MG Tab, Take 10 mg by mouth every day., Disp: , Rfl:     Syringe/Needle, Disp, (SYRINGE 3CC/23GX1\") 23G X 1\" 3 ML Misc, Use with testosterone every 2 weeks, Disp: 6 Each, Rfl: 3    glucose blood strip, 1 Strip by Other route every day., Disp: 100 Strip, Rfl: 3    EPINEPHrine (EPIPEN) 0.3 MG/0.3ML Solution Auto-injector solution for injection, Inject 0.3 mL into the thigh one time for 1 dose (Patient not taking: Reported on 1/18/2025), Disp: 2 Each, Rfl: 2    aspirin EC (ECOTRIN) 81 MG Tablet Delayed Response, Take 162 mg by mouth every morning. 2 tablets = 162 mg., Disp: , Rfl:     losartan (COZAAR) 50 MG Tab, Take 1 Tablet by mouth every day., Disp: 90 Tablet, Rfl: 7    fexofenadine (ALLEGRA) 180 MG tablet, Take 180 mg by mouth every day., Disp: , Rfl:       ALLERGIES  Allergies   Allergen Reactions    Iodine Hives, Rash and Swelling     Topical - rash  IV contrast - full body hives and swelling    Lisinopril Cough    Pineapple Itching and Swelling     Tongue swelling and itchy throat " "   Shellfish Allergy Anaphylaxis    Biofreeze Rash     Rash and blisters    Cat Hair Extract Hives, Shortness of Breath, Rash, Runny Nose, Itching, Swelling and Anxiety          Latex Rash          Atorvastatin      Myaglias    Mushroom Extract Complex Swelling     Swelling and mouth numbness        PHYSICAL EXAM  VITAL SIGNS: BP (!) 155/92   Pulse 63   Temp 36.7 °C (98 °F) (Temporal)   Resp 18   Ht 1.788 m (5' 10.4\")   Wt 113 kg (249 lb 1.9 oz)   SpO2 91%   BMI 35.34 kg/m²   Reviewed and per tensive afebrile    Constitutional: Well developed, Well nourished, Mild distress.  HENT: Normocephalic, atraumatic, bilateral external ears normal, No intraoral erythema, edema, exudate  Eyes: PERRLA, conjunctiva pink, no scleral icterus.   Cardiovascular: Regular rate and rhythm. No murmurs, rubs or gallops.  No dependent edema or calf tenderness  Respiratory: Lungs clear to auscultation bilaterally. No wheezes, rales, or rhonchi.  Abdominal:  Abdomen soft, Generalized tenderness; worse to the left mid abdomen, non distended. No rebound, or guarding. No masses. Bowel sounds are normal.   Skin: No erythema, no rash. No wounds or bruising.  Genitourinary: No costovertebral angle tenderness.   Musculoskeletal: no deformities.   Neurologic: Alert & oriented x 3, cranial nerves 2-12 intact by passive exam.  No focal deficit noted.  Psychiatric: Affect normal, Judgment normal, Mood normal.       LABS Ordered and Reviewed by Me:  Results for orders placed or performed during the hospital encounter of 01/18/25   CBC WITH DIFFERENTIAL    Collection Time: 01/18/25  2:53 PM   Result Value Ref Range    WBC 9.7 4.8 - 10.8 K/uL    RBC 6.86 (H) 4.70 - 6.10 M/uL    Hemoglobin 19.0 (H) 14.0 - 18.0 g/dL    Hematocrit 58.1 (H) 42.0 - 52.0 %    MCV 84.8 81.4 - 97.8 fL    MCH 27.6 27.0 - 33.0 pg    MCHC 32.6 32.3 - 36.5 g/dL    RDW 41.0 35.9 - 50.0 fL    Platelet Count 249 164 - 446 K/uL    MPV 9.9 9.0 - 12.9 fL    Neutrophils-Polys 69.90 " 44.00 - 72.00 %    Lymphocytes 22.50 22.00 - 41.00 %    Monocytes 5.70 0.00 - 13.40 %    Eosinophils 1.00 0.00 - 6.90 %    Basophils 0.60 0.00 - 1.80 %    Immature Granulocytes 0.30 0.00 - 0.90 %    Nucleated RBC 0.00 0.00 - 0.20 /100 WBC    Neutrophils (Absolute) 6.76 1.82 - 7.42 K/uL    Lymphs (Absolute) 2.18 1.00 - 4.80 K/uL    Monos (Absolute) 0.55 0.00 - 0.85 K/uL    Eos (Absolute) 0.10 0.00 - 0.51 K/uL    Baso (Absolute) 0.06 0.00 - 0.12 K/uL    Immature Granulocytes (abs) 0.03 0.00 - 0.11 K/uL    NRBC (Absolute) 0.00 K/uL   COMP METABOLIC PANEL    Collection Time: 01/18/25  2:53 PM   Result Value Ref Range    Sodium 140 135 - 145 mmol/L    Potassium 4.1 3.6 - 5.5 mmol/L    Chloride 101 96 - 112 mmol/L    Co2 28 20 - 33 mmol/L    Anion Gap 11.0 7.0 - 16.0    Glucose 118 (H) 65 - 99 mg/dL    Bun 10 8 - 22 mg/dL    Creatinine 1.06 0.50 - 1.40 mg/dL    Calcium 9.5 8.4 - 10.2 mg/dL    Correct Calcium 9.0 8.5 - 10.5 mg/dL    AST(SGOT) 31 12 - 45 U/L    ALT(SGPT) 47 2 - 50 U/L    Alkaline Phosphatase 69 30 - 99 U/L    Total Bilirubin 0.5 0.1 - 1.5 mg/dL    Albumin 4.6 3.2 - 4.9 g/dL    Total Protein 7.6 6.0 - 8.2 g/dL    Globulin 3.0 1.9 - 3.5 g/dL    A-G Ratio 1.5 g/dL   LIPASE    Collection Time: 01/18/25  2:53 PM   Result Value Ref Range    Lipase 31 11 - 82 U/L   URINALYSIS    Collection Time: 01/18/25  2:53 PM    Specimen: Urine   Result Value Ref Range    Color Yellow     Character Clear     Specific Gravity >=1.030 <1.035    Ph 6.0 5.0 - 8.0    Glucose Negative Negative mg/dL    Ketones Negative Negative mg/dL    Protein 100 (A) Negative mg/dL    Bilirubin Negative Negative    Nitrite Negative Negative    Leukocyte Esterase Negative Negative    Occult Blood Trace (A) Negative    Micro Urine Req Microscopic    URINE MICROSCOPIC (W/UA)    Collection Time: 01/18/25  2:53 PM   Result Value Ref Range    WBC 0-2 /hpf    RBC 0-2 /hpf    Bacteria None None /hpf    Epithelial Cells 0-2 0 - 5 /hpf    Urine Casts 0-2 0  - 2 /lpf    Hyaline Cast Present /lpf   ESTIMATED GFR    Collection Time: 01/18/25  2:53 PM   Result Value Ref Range    GFR (CKD-EPI) 86 >60 mL/min/1.73 m 2       Interpretations:    Pulse Oximetry: Low normal oxygenation on room air at 91%      RADIOLOGY  I have independently interpreted the CT abd/pelvis associated with this visit demonstrating No divertiulitis.  I am awaiting the final reading from the radiologist.     Final Radiology Report  CT-ABDOMEN-PELVIS WITH   Final Result      No acute findings.        Radiologist interpretation have been reviewed by me.     COURSE & MEDICAL DECISION MAKING  3:06 PM - Patient seen and examined at bedside. This is a 49 year old man who presents to the emergency department for evaluation of constant lower abdominal pain that started 6 days ago. Discussed plan of care, including performing lab work and imaging. Patient agrees to the plan of care. The patient will be medicated with Benadryl 50 mg and Solu-Cortef 100 mg. Ordered for UA, Lipase, CMP, CBC w/ Diff., and CT-Abdomen-Pelvis w/ to evaluate his symptoms.      INTERVENTIONS BY ME:  Medications   hydrocortisone sodium succinate PF (Solu-CORTEF) 100 MG injection 200 mg (200 mg Intravenous Given 1/18/25 1543)     And   diphenhydrAMINE (Benadryl) injection 50 mg (50 mg Intravenous Given 1/18/25 1543)       3:40 PM - The patient was reevaluated at bedside. Informed the patient that his lab work is reassuring and there is no evidence of diverticulitis. At this time, I do not believe that imaging is indicated, but the option was given to the patient. He decided to go ahead with the CT.     ASSESSMENT, COURSE AND PLAN:  PROBLEMS EVALUATED THIS VISIT:    This patient presents with abdominal pain nausea and diarrhea of multiple days duration.  He had an ill contact with similar symptoms.  This is very likely norovirus.  His abdominal pain and tenderness are generalized but they are worse in the left mid abdomen on my exam.  I  think is very unlikely that this is diverticulitis given the ill contact, the duration, the absence of fever, the absence of leukocytosis and the generalized nature of the pain.  I felt the patient did not need a CT scan of the abdomen but after shared decision making he prefers to proceed with the test.  He will require pretreatment for contrast allergy for 4 hours prior to the scan.  Dr. Gaytan will give him the results of the study.  There is no evidence of UTI.  Kidney stone is unlikely.  The patient has type 2 diabetes but this chronic condition appears to be well-controlled at this time although it increases his risk from bacterial infections.      DISPOSITION AND DISCUSSIONS    I have discussed management of the patient with the following physicians and sources:    Dr. Gaytan ER physician who will check the results of the CT once its completed and make an appropriate disposition for the patient    RISK:  Moderate given need for prescription medication management    PLAN:  New Prescriptions    ONDANSETRON (ZOFRAN ODT) 4 MG TABLET DISPERSIBLE    Take 1 Tablet by mouth every 8 hours as needed for Nausea/Vomiting.     OTC Imodium  Rest, fluids, NSAIDs acetaminophen    Abdominal pain handout given    Return for worsening right lower quadrant and left lower quadrant abdominal pain ill appearance uncontrolled vomiting GI bleed    Followup:  Manjeet Moyer III, M.D.  [address] [facility] [id number] [phone]    Schedule an appointment as soon as possible for a visit   As neededif not better 2-3 days      CONDITION: Stable.     FINAL IMPRESSION  1. Generalized abdominal pain    2. Diarrhea of presumed infectious origin    3. Nausea    4. Viral illness        IEalrene (Scribe), am scribing for, and in the presence of, Martir Maya M.D..    Electronically signed by: Earlene Moreno (Scribvalerie), 1/18/2025    IMartir M.D. personally performed the services described  in this documentation, as scribed by Earlene Moreno in my presence, and it is both accurate and complete.     The note accurately reflects work and decisions made by me.  Martir Maya M.D.  1/18/2025  5:40 PM

## 2025-01-18 NOTE — PROGRESS NOTES
Chief Complaint   Patient presents with    GI Problem     Had a teledoc visit on 1/16 for norovirus. Still having sharp abdominal pain on left side, and having foul smelling belches. Hx of GERD, concerned for ulcers.       HISTORY OF PRESENT ILLNESS: Patient is a pleasant 49 y.o. male who presents to urgent care today with GI complaints.  The patient notes his symptoms started approximately 1 week ago with nausea, abdominal pain, body aches, and diarrhea.  His symptoms were severe for the first 3 days, symptoms have now improved but continues to have abdominal pain, nausea, and belching.  His pain is primarily in left left lower quadrant.  Does have a history of GERD and diabetes, denies known history of diverticulitis.  Last blood sugar taken was 116.  His wife was ill with similar GI symptoms but she has since improved.    Patient Active Problem List    Diagnosis Date Noted    Seasonal allergic rhinitis due to pollen 05/07/2024    Low testosterone in male 05/07/2024    Wellness examination 10/24/2023    Chest pain 09/15/2022    Chronic fatigue 09/28/2021    Stress 01/25/2021    Acquired hypothyroidism 09/11/2019    Type 2 diabetes mellitus with diabetic microalbuminuria, without long-term current use of insulin ([facility]) 09/11/2019    Obesity (BMI 30-39.9) 08/16/2019    GERD (gastroesophageal reflux disease) 08/16/2019    Coronary artery disease involving native coronary artery of native heart without angina pectoris 07/11/2019    Pure hypercholesterolemia 07/11/2019    Low HDL (under 40) 07/11/2019    Primary hypertension 07/11/2019       Allergies:Iodine, Lisinopril, Pineapple, Shellfish allergy, Biofreeze, Cat hair extract, Latex, Atorvastatin, and Mushroom extract complex    Current Outpatient Medications Ordered in Epic   Medication Sig Dispense Refill    lansoprazole (PREVACID) 30 MG CAPSULE DELAYED RELEASE Take 1 Capsule by mouth every day. 90 Capsule 2    Tirzepatide (MOUNJARO) 2.5 MG/0.5ML Solution  "Auto-injector Inject 1 Pen under the skin every 7 days. 2 mL 0    Evolocumab (REPATHA SURECLICK) 140 MG/ML Solution Auto-injector SubQ injection pen Inject 1 mL under the skin every 14 days. 2.1 Each 12    levothyroxine (SYNTHROID) 112 MCG Tab Take 1 Tablet by mouth every morning on an empty stomach. 90 Tablet 3    sucralfate (CARAFATE) 1 GM Tab Take 1 tablet by mouth twice daily 60 Tablet 0    glimepiride (AMARYL) 4 MG Tab Take 1 Tablet by mouth 2 times a day.      testosterone cypionate (DEPO-TESTOSTERONE) 200 MG/ML injection INJECT 1 ML INTRAMUSCULARLY INTO THE SHOULDER, THIGH OR BUTTOCKS ONCE EVERY 2 WEEKS.  For 90 days      nitroglycerin (NITROSTAT) 0.4 MG SL Tab Place 1 Tablet under the tongue as needed for Chest Pain. 25 Tablet 11    ezetimibe (ZETIA) 10 MG Tab Take 10 mg by mouth every day.      Syringe/Needle, Disp, (SYRINGE 3CC/23GX1\") 23G X 1\" 3 ML Misc Use with testosterone every 2 weeks 6 Each 3    glucose blood strip 1 Strip by Other route every day. 100 Strip 3    aspirin EC (ECOTRIN) 81 MG Tablet Delayed Response Take 162 mg by mouth every morning. 2 tablets = 162 mg.      losartan (COZAAR) 50 MG Tab Take 1 Tablet by mouth every day. 90 Tablet 7    fexofenadine (ALLEGRA) 180 MG tablet Take 180 mg by mouth every day.      EPINEPHrine (EPIPEN) 0.3 MG/0.3ML Solution Auto-injector solution for injection Inject 0.3 mL into the thigh one time for 1 dose (Patient not taking: Reported on 1/18/2025) 2 Each 2     No current Epic-ordered facility-administered medications on file.       Past Medical History:   Diagnosis Date    Bowel habit changes 02/2021    constipation and diarrhea    Glaucoma     L eye    Heart burn     High cholesterol     Hypertension     Myocardial infarct (HCC)     Snoring     Tuberculosis 2008    no treatment, \"not active\"       Social History     Tobacco Use    Smoking status: Never    Smokeless tobacco: Never   Vaping Use    Vaping status: Never Used   Substance Use Topics    Alcohol " "use: Not Currently     Comment: occasional, rare    Drug use: No       Family Status   Relation Name Status    Mo  Alive    Fa  Other        Unknown to patient    Sis  Alive    Bro      Child  Alive    Child  Alive    Child  Alive    Child  Alive    Child  Alive   No partnership data on file     Family History   Problem Relation Age of Onset    Diabetes Mother     No Known Problems Father        ROS:  Review of Systems   Constitutional: Positive for tactile fever, chills, malaise, fatigue.    HENT: Negative for ear pain, nosebleeds, congestion, sore throat and neck pain.    Eyes: Negative for vision changes.   Neuro: Negative for headache, sensory changes, weakness, seizure, LOC.   Cardiovascular: Negative for chest pain, palpitations, orthopnea and leg swelling.   Respiratory: Negative for cough, sputum production, shortness of breath and wheezing.   Gastrointestinal: Positive for abdominal pain, nausea, diarrhea.  Negative for vomiting.  Genitourinary: Negative for dysuria, urgency and frequency.  Musculoskeletal: Negative for falls, neck pain, back pain, joint pain, myalgias.   Skin: Negative for rash, diaphoresis.     Exam:  BP (!) 156/88 (BP Location: Right arm, Patient Position: Sitting, BP Cuff Size: Adult)   Pulse 77   Temp 36.2 °C (97.2 °F) (Temporal)   Resp 16   Ht 1.791 m (5' 10.5\")   Wt 113 kg (249 lb 1.9 oz)   SpO2 94%   General: well-nourished, well-developed male in NAD  Head: normocephalic, atraumatic  Eyes: PERRLA, no conjunctival injection, acuity grossly intact, lids normal.  Ears: normal shape and symmetry, no tenderness, no discharge. External canals are without any significant edema or erythema. Tympanic membranes are without any inflammation, no effusion. Gross auditory acuity is intact.  Nose: symmetrical without tenderness, no discharge.  Mouth/Throat: reasonable hygiene, no erythema, exudates or tonsillar enlargement.  Neck: no masses, range of motion within normal limits, no " tracheal deviation. No obvious thyroid enlargement.   Lymph: no cervical adenopathy. No supraclavicular adenopathy.   Neuro: alert and oriented. Cranial nerves 1-12 grossly intact. No sensory deficit.   Cardiovascular: regular rate and rhythm. No edema.  Pulmonary: no distress. Chest is symmetrical with respiration, no wheezes, crackles, or rhonchi.   Abdomen: soft, no guarding, no hepatosplenomegaly.  There is tenderness to abdomen, primarily left lower quadrant.  Tenderness is also noted to right lower quadrant as well as left upper quadrant.  Musculoskeletal: no clubbing, appropriate muscle tone, gait is stable.  Skin: warm, dry, intact, no clubbing, no cyanosis, no rashes.   Psych: appropriate mood, affect, judgement.         Assessment/Plan:  1. Lower abdominal pain  CANCELED: CT-ABDOMEN-PELVIS WITH    CANCELED: Comp Metabolic Panel    CANCELED: CBC WITH DIFFERENTIAL      2. Elevated blood pressure reading            Patient is a pleasant 49-year-old who presents with GI symptoms over the last week.  Most of his symptoms have improved but now he continues to have pain to the left lower quadrant, correlating tenderness upon examination.  I have attempted to order blood work and a CT scan for examination.  Unfortunately I am unable to obtain an examination until Wednesday.  Therefore, at this time, I feel the patient requires a higher level of care in the ED for closer monitoring, stat lab work and/or imaging for further evaluation. This has been discussed with the patient and he states agreement and understanding. The patient is stable to leave POV at this time and will go directly to ED without delay.  Instructed remain NPO. As a side note, patient's blood pressure found to be elevated today, instructed to monitor and follow-up with PCP regarding.            Please note that this dictation was created using voice recognition software. I have made every reasonable attempt to correct obvious errors, but I expect  that there are errors of grammar and possibly content that I did not discover before finalizing the note.      CARL Sim.

## 2025-01-18 NOTE — LETTER
January 18, 2025         Patient: Giuseppe Swift   YOB: 1975   Date of Visit: 1/18/2025           To Whom it May Concern:    Giuseppe Swift was seen in my clinic on 1/18/2025. He may be excused from work today.       If you have any questions or concerns, please don't hesitate to call.        Sincerely,           CARL Sim.  Electronically Signed

## 2025-01-18 NOTE — ED TRIAGE NOTES
"Chief Complaint   Patient presents with    Sent from Urgent Care     Was told he needs abd CT and lab work. They were unable to obtain labs at .     Abdominal Pain     Lower quadrants. Ongoing roughly 5d. Associated nausea, constipation, and diarrhea.   Describes pain as sharp and stabbing.      Blood Pressure: (!) 155/92, Pulse: 63, Respiration: 18, Temperature: 36.7 °C (98 °F), Height: 178.8 cm (5' 10.4\"), Weight: 113 kg (249 lb 1.9 oz), BMI (Calculated): 35.34, BSA (Calculated): 2.4, Pulse Oximetry: 91 %, O2 Delivery Device: None - Room Air  "

## 2025-01-19 NOTE — ED NOTES
Pt moved from Room 07b to 02.  Received report from Lidia. Pt and family aware of plan of care and call light in reach.

## 2025-01-19 NOTE — ED NOTES
Spoke to CT, plan for pt to go to CT around 1945. POC explained to pt, denies questions at this time. Call light in reach.

## 2025-01-19 NOTE — ED NOTES
Pharmacy Medication Reconciliation      ~Medication reconciliation updated and complete per patient spouse at bedside with medication list. Reviewed list with spouse   ~Allergies have been verified and updated   ~No oral ABX within the last 30 days  ~Patient home pharmacy :  [facility]      ~Anticoagulants (rivaroxaban, apixaban, edoxaban, dabigatran, warfarin, enoxaparin) taken in the last 14 days? No

## 2025-01-19 NOTE — ED NOTES
Piv removed.     Patient is stable for discharge at this time, anticipatory guidance provided, close follow-up is encouraged, and ED return instructions have been detailed. Patient and family are both agreeable to the disposition and plan and discharged home in ambulatory state and in good condition.     Rx education provided, Pt verbalized understanding;

## 2025-01-19 NOTE — DISCHARGE INSTRUCTIONS
This is likely a viral illness like norovirus.  Rest and drink plenty of fluids.  Take Zofran for nausea and over-the-counter Imodium if needed for copious diarrhea.  Rest and drink plenty of fluids.  See your doctor if not improving in 3 days.  Return to the ER for severe or worsening left lower quadrant or right lower quadrant abdominal pain.  This is not expected.

## 2025-01-20 ENCOUNTER — PATIENT MESSAGE (OUTPATIENT)
Dept: MEDICAL GROUP | Facility: PHYSICIAN GROUP | Age: 50
End: 2025-01-20
Payer: COMMERCIAL

## 2025-01-20 DIAGNOSIS — R79.89 LOW TESTOSTERONE IN MALE: ICD-10-CM

## 2025-01-20 RX ORDER — TESTOSTERONE CYPIONATE 200 MG/ML
INJECTION, SOLUTION INTRAMUSCULAR
Qty: 6 ML | Refills: 0 | Status: SHIPPED | OUTPATIENT
Start: 2025-01-20 | End: 2025-04-20

## 2025-01-23 RX ORDER — SUCRALFATE 1 G/1
1 TABLET ORAL 2 TIMES DAILY
Qty: 60 TABLET | Refills: 0 | Status: SHIPPED
Start: 2025-01-23

## 2025-01-23 RX ORDER — EVOLOCUMAB 140 MG/ML
140 INJECTION, SOLUTION SUBCUTANEOUS
Qty: 2.1 EACH | Refills: 12 | OUTPATIENT
Start: 2025-01-23

## 2025-01-24 ENCOUNTER — PATIENT MESSAGE (OUTPATIENT)
Dept: CARDIOLOGY | Facility: MEDICAL CENTER | Age: 50
End: 2025-01-24
Payer: COMMERCIAL

## 2025-01-24 NOTE — TELEPHONE ENCOUNTER
Received request via: Pharmacy    Was the patient seen in the last year in this department? Yes    Does the patient have an active prescription (recently filled or refills available) for medication(s) requested? No    Pharmacy Name: : [facility] [address] [place] [address], [place] - [id number] [facility]     Does the patient have snf Plus and need 100-day supply? (This applies to ALL medications) Patient does not have SCP

## 2025-01-27 ENCOUNTER — OFFICE VISIT (OUTPATIENT)
Dept: MEDICAL GROUP | Facility: PHYSICIAN GROUP | Age: 50
End: 2025-01-27
Payer: COMMERCIAL

## 2025-01-27 DIAGNOSIS — E78.00 PURE HYPERCHOLESTEROLEMIA: ICD-10-CM

## 2025-01-27 PROCEDURE — 99401 PREV MED CNSL INDIV APPRX 15: CPT | Performed by: PHARMACIST

## 2025-01-27 NOTE — PROGRESS NOTES
Weston,  Is Giuseppe established with your office officially? I was wondering if clinical pharm could take over the Rx for the monjouro but if not I can keep doing it.  Thanks as always,  LS

## 2025-01-27 NOTE — PROGRESS NOTES
"Patient Consult Note - Follow Up Visit  Primary care physician: Manjeet Moyer III, M.D.    Reason for consult: Management of Uncontrolled Type 2 Diabetes    Time IN:  10:30am  Time OUT:  10:50am    HPI:  Giuseppe Swift is a 49 y.o. old patient who comes in today for evaluation of above stated problem.    Most Recent HbA1c:   Lab Results   Component Value Date/Time    HBA1C 7.6 (H) 10/21/2024 12:00 AM        Current Diabetes Regimen:  GLP1a/GIP:  Mounjaro 2.5mg SQ once weekly    Previously attempted medications:  Rybelsus - stopped by previous PCP outside of [facility]  Metformin - GI distress  Glimepiride - stopped on own accord    Discussed FBG goal of , 2hrPP <180, and A1c <7.0%.  Before Breakfast:  116, 157, 112  Before Lunch:  Before Dinner:  Before Bedtime:  Other times:  Hypoglycemia:  None    Lifestyle:  Has been working to incorporate more protein to nutrition habits and minimize carbs.  He and wife unfortunately suffered a GI illness that caused good amount of nausea, vomiting, and diarrhea.  Was eating bland, carb heavy foods during that time, but is now back to normal eating habits.  Noted appetite suppression with Mounjaro.  Cleared to return to resistance training Feb 1st.    Previous visit notes:  12/2024  Current Exercise - None at this time as directed by cardiology  Exercise Goal - Would highly benefit from at least 150-180 min/week moderate intensity exercise.  Previous hx of resistance training on regular basis along with cardio work.  With recent CPK elevations and associated myalgias, has been instructed to holding off on exercise until levels return to baseline.     Nutrition -   Has good understanding of nutrition associated with diabetes and cardiovascular health.  Patient states that he does not necessarily \"cut anything out\" as he look for good mix of macronutrients.  He states that he and wife focus on one true meal per day, but admits that he can get into a good deal of " snacking throughout the day.  Does admit to chips, crackers, good deal of fruits, yogurts as part of snacking.  Portions seem to be under good control.    Preventative Management  BP regimen (ACEi/ARB): Losartan 50mg QD  BP <140/90: Yes  Statin:  Repatha 140mg SQ every 14 days  LDL <100: No, labs ordered today  Lab Results   Component Value Date/Time    CHOLSTRLTOT 179 05/17/2024 09:33 AM     (H) 05/17/2024 09:33 AM    HDL 41 05/17/2024 09:33 AM    TRIGLYCERIDE 86 05/17/2024 09:33 AM       Last Microalbumin/Cr:  Lab Results   Component Value Date/Time    MALBCRT 136 (H) 05/03/2024 09:11 AM    MICROALBUR 18.1 05/03/2024 09:11 AM     Last A1c:  Lab Results   Component Value Date/Time    HBA1C 7.6 (H) 10/21/2024 12:00 AM      Monofilament exam: will need to conduct at next visit      REC DM Score: 1  Care gaps addressed:   LDL is above 100: Optimized lipid medications/management  Care gaps updated in Health Maintenance    ROS:  Constitutional: No weight loss  Cardiac: No palpitations or racing heart  Resp: No shortness of breath  Neuro: No numbness or tinging in feet  Endo: No heat or cold intolerance, no polyuria or polydipsia  All other systems were reviewed and were negative.    Past Medical History:  Patient Active Problem List    Diagnosis Date Noted    Seasonal allergic rhinitis due to pollen 05/07/2024    Low testosterone in male 05/07/2024    Wellness examination 10/24/2023    Chest pain 09/15/2022    Chronic fatigue 09/28/2021    Stress 01/25/2021    Acquired hypothyroidism 09/11/2019    Type 2 diabetes mellitus with diabetic microalbuminuria, without long-term current use of insulin (HCC) 09/11/2019    Obesity (BMI 30-39.9) 08/16/2019    GERD (gastroesophageal reflux disease) 08/16/2019    Coronary artery disease involving native coronary artery of native heart without angina pectoris 07/11/2019    Pure hypercholesterolemia 07/11/2019    Low HDL (under 40) 07/11/2019    Primary hypertension 07/11/2019        Past Surgical History:  Past Surgical History:   Procedure Laterality Date    MN UPPER GI ENDOSCOPY,DIAGNOSIS  2/22/2021    Procedure: GASTROSCOPY;  Surgeon: Jcarlos Thomas M.D.;  Location: SURGERY [facility];  Service: EUS    EGD W/ENDOSCOPIC ULTRASOUND  2/22/2021    Procedure: EGD, WITH ENDOSCOPIC US - UPPER RADIAL LINEAR;  Surgeon: Jcarlos Thomas M.D.;  Location: SURGERY [facility];  Service: EUS    VASECTOMY  2007    FUSION, SPINE, LUMBAR, PLIF  2004    L4-S1       Allergies:  Iodine, Lisinopril, Pineapple, Shellfish allergy, Biofreeze, Cat hair extract, Latex, Mushroom extract complex, and Atorvastatin    Social History:  Social History     Socioeconomic History    Marital status:      Spouse name: Not on file    Number of children: Not on file    Years of education: Not on file    Highest education level: Associate degree: occupational, technical, or vocational program   Occupational History    Not on file   Tobacco Use    Smoking status: Never    Smokeless tobacco: Never   Vaping Use    Vaping status: Never Used   Substance and Sexual Activity    Alcohol use: Not Currently     Comment: occasional, rare    Drug use: No    Sexual activity: Yes     Partners: Female     Birth control/protection: Surgical   Other Topics Concern    Not on file   Social History Narrative    Not on file     Social Drivers of Health     Financial Resource Strain: Low Risk  (1/25/2021)    Overall Financial Resource Strain (CARDIA)     Difficulty of Paying Living Expenses: Not hard at all   Food Insecurity: No Food Insecurity (1/25/2021)    Hunger Vital Sign     Worried About Running Out of Food in the Last Year: Never true     Ran Out of Food in the Last Year: Never true   Transportation Needs: No Transportation Needs (1/25/2021)    PRAPARE - Transportation     Lack of Transportation (Medical): No     Lack of Transportation (Non-Medical): No   Physical Activity: Sufficiently Active (1/25/2021)    Exercise Vital Sign      Days of Exercise per Week: 4 days     Minutes of Exercise per Session: 120 min   Stress: Stress Concern Present (1/25/2021)    [employer] - Occupational Stress Questionnaire     Feeling of Stress : To some extent   Social Connections: Moderately Integrated (1/25/2021)    Social Connection and Isolation Panel [NHANES]     Frequency of Communication with Friends and Family: More than three times a week     Frequency of Social Gatherings with Friends and Family: Once a week     Attends Evangelical Services: More than 4 times per year     Active Member of Clubs or Organizations: Yes     Attends Club or Organization Meetings: More than 4 times per year     Marital Status:    Intimate Partner Violence: Not on file   Housing Stability: Unknown (1/25/2021)    Housing Stability Vital Sign     Unable to Pay for Housing in the Last Year: No     Number of Places Lived in the Last Year: Not on file     Unstable Housing in the Last Year: No       Family History:  Family History   Problem Relation Age of Onset    Diabetes Mother     No Known Problems Father        Medications:    Current Outpatient Medications:     sucralfate (CARAFATE) 1 GM Tab, Take 1 tablet by mouth twice daily, Disp: 60 Tablet, Rfl: 0    testosterone cypionate (DEPO-TESTOSTERONE) 200 MG/ML injection, INJECT 1 ML INTRAMUSCULARLY INTO THE SHOULDER, THIGH OR BUTTOCKS ONCE EVERY 2 WEEKS.  For 90 days, Disp: 6 mL, Rfl: 0    calcium carbonate (LAURENCE-SELTZER HEARTBURN) 750 MG chewable tablet, Chew 3,000-3,750 mg every 2 hours as needed (heartburn)., Disp: , Rfl:     Multiple Vitamins-Minerals (MENS ONE DAILY PO), Take 1 Tablet by mouth every morning., Disp: , Rfl:     ondansetron (ZOFRAN ODT) 4 MG TABLET DISPERSIBLE, Take 1 Tablet by mouth every 8 hours as needed for Nausea/Vomiting., Disp: 8 Tablet, Rfl: 0    lansoprazole (PREVACID) 30 MG CAPSULE DELAYED RELEASE, Take 1 Capsule by mouth every day., Disp: 90 Capsule, Rfl: 2     "Tirzepatide (MOUNJARO) 2.5 MG/0.5ML Solution Auto-injector, Inject 1 Pen under the skin every 7 days., Disp: 2 mL, Rfl: 0    Evolocumab (REPATHA SURECLICK) 140 MG/ML Solution Auto-injector SubQ injection pen, Inject 1 mL under the skin every 14 days., Disp: 2.1 Each, Rfl: 12    levothyroxine (SYNTHROID) 112 MCG Tab, Take 1 Tablet by mouth every morning on an empty stomach., Disp: 90 Tablet, Rfl: 3    nitroglycerin (NITROSTAT) 0.4 MG SL Tab, Place 1 Tablet under the tongue as needed for Chest Pain., Disp: 25 Tablet, Rfl: 11    ezetimibe (ZETIA) 10 MG Tab, Take 10 mg by mouth every evening., Disp: , Rfl:     Syringe/Needle, Disp, (SYRINGE 3CC/23GX1\") 23G X 1\" 3 ML Misc, Use with testosterone every 2 weeks, Disp: 6 Each, Rfl: 3    glucose blood strip, 1 Strip by Other route every day., Disp: 100 Strip, Rfl: 3    aspirin EC (ECOTRIN) 81 MG Tablet Delayed Response, Take 162 mg by mouth every morning. 2 tablets = 162 mg., Disp: , Rfl:     losartan (COZAAR) 50 MG Tab, Take 1 Tablet by mouth every day., Disp: 90 Tablet, Rfl: 7    fexofenadine (ALLEGRA) 180 MG tablet, Take 180 mg by mouth every day., Disp: , Rfl:     Labs: Reviewed    Physical Examination:  Vital signs: There were no vitals taken for this visit. There is no height or weight on file to calculate BMI.  General: No apparent distress, cooperative  Eyes: No scleral icterus or discharge  ENMT: Normal on external inspection of nose, lips, normal thyroid exam  Neck: No abnormal masses on inspection  Resp: Normal effort, clear to auscultation bilaterally   CVS: Regular rate and rhythm, S1 S2 normal, no murmur   Extremities: No edema  Abdomen: abdominal obesity present  Neuro: Alert and oriented  Skin: No rash  Psych: Normal mood and affect, intact memory and able to make informed decisions    Assessment and Plan:    Basic physiology of DMII was explained to patient as well as microvascular/macrovascular complications. The importance of increasing physical activity to " improve diabetes control was discussed with the patient. Patient was also educated on changing diet and making better choices to help control blood sugar.    Patient seems to be tolerating initial dosing of Mounjaro.  He was having some problems with GI distress and heartburn on days when he gave both Mounjaro and Repatha injections, but this has gotten better as he is now spacing out from one another.  Testing FBG infrequently, but seem to be at goal (other than one reading following a night where he had some chocolates).  Last A1c above goal at 7.6%, did not repeat at today's visit, will plan to conduct after three months therapy with Mounjaro.    Has continued to focus on improving nutrition habits.  Higher protein intake and minimizing carbs as best as possible.  Noted appetite suppression and early satiety with most meals since starting Mounjaro.  Has been cleared to resume exercise/resistance training February 1st.    Will continue with current Mounjaro dosing for one additional month, then plan to further optimize.  Stressed importance of continued focus on nutrition habits, optimize protein intake, minimize carbs.  Increase exercise back to previous habits.    Follow Up:  Four weeks.    Thank you for allowing me to participate in the care of this patient.    Weston Manriquez, PharmD, BCACP  01/27/25    CC:   Manjeet Moyer III, M.D.

## 2025-01-28 RX ORDER — TIRZEPATIDE 2.5 MG/.5ML
1 INJECTION, SOLUTION SUBCUTANEOUS
Qty: 2 ML | Refills: 1 | Status: SHIPPED | OUTPATIENT
Start: 2025-01-28

## 2025-01-28 RX ORDER — TIRZEPATIDE 2.5 MG/.5ML
1 INJECTION, SOLUTION SUBCUTANEOUS
Qty: 2 ML | Refills: 0 | OUTPATIENT
Start: 2025-01-28

## 2025-01-30 NOTE — PROGRESS NOTES
Medical Decision Making:  Today's Assessment / Status / Plan:   -Aggressive premature coronary disease, aggressive risk reduction endeavors.  Tolerating aspirin.  Beta-blocker is no longer needed for CAD with normal EF.  -Significant statin intolerance with elevated CK.  Most recent elevated CK of 186 was on statin but when he was not exercising or recently working at [employer].  -Repeating another CK without exercise, without working and off of statin.  He will then resume a more modified workout.  I have asked him to go with lighter weights and multiple repetitions and cardiovascular.  Will repeat the CK again in about 2 months to see how it elevates with exercise.  -Currently on Repatha and ezetimibe.  If we can, retrial once weekly pravastatin 10 mg.  -Also working on diabetes control.  Tirzepatide has been approved.  Had some setbacks with GERD.  -I have referred him to vascular medicine to help with lipids and diabetes management.  If he chooses to see vascular medicine, he would not need to see clinical pharmacy or vice versa.  -Follow-up LDL once we get his medications more stabilized.  -Checking blood pressure at home, typically looks better at home  -Testosterone does been appropriately replaced is safe for heart patients.  -His resting chest pressure is atypical, has nitroglycerin.  -Working on thyroid, referred to an endocrinologist.  Might need to see a neuro-ophthalmologist.  -RTC with me in 3 months, I will follow-up his CKs over MyChart.      Assessment:     1. Coronary artery disease involving native coronary artery of native heart without angina pectoris  Lipoprotein (a)      2. Stented coronary artery        3. Pure hypercholesterolemia        4. Primary hypertension        5. Type 2 diabetes mellitus with diabetic microalbuminuria, without long-term current use of insulin ([facility])        6. Allergy to iodine        7. Elevated CK  CREATINE KINASE    CREATINE KINASE      8. Statin intolerance   Lipoprotein (a)      9. History of acute myocardial infarction            Chief Complaint:   Chief Complaint   Patient presents with    Follow-Up     Coronary artery disease involving native coronary artery of native heart without angina pectoris    Hypertension     Primary hypertension    Other     Pure hypercholesterolemia     Giuseppe Bhandari is a 49 y.o. male who returns for long-term management of complex care including premature CAD/MI/stent, HLP, DM2, HTN, iodine allergy, statin intolerance, elevated CK.    He was working out in the gym, had been having chest discomfort he thought was related to lifting weights, would get tired, lightheaded, fatigued, nauseous.   The month before his MI, he thought he had the flu. Was visiting here from [place] at the time.  Was planning to drive home from the gym but somehow drove to the ED, he does not really recall making the decision to go to the ED, vaguely recalls being in the ED, they reported he was sweating heavily.  Had NSTEMI October 3, 2018 in [place], [facility] 95% prox LAD that was treated with a 4.5 x 18 mm drug-eluting stent.    There is minimal disease elsewhere.    Echocardiogram demonstrated a normal left ventricular systolic function with an LVEF of 60. Sounds like he may have had a Nuc or CT scan.  Had hives.  Completed DAPT.  On BB, ASA, Repatha, Zetia.  Significant statin intolerance.    Has HLP, was on lipitor 80 mg, ldl 60, hdl low. LDL was initially 131.  Recalls being on pravastatin with no side effects.  Then placed on rosuvastatin 40 mg with ezetimibe but having myalgias.  , 282 on statin and he also works out intensely and also has to maneuver items up to 500 pounds working for [employer].    Repeat CPK was 186 with no workouts and will need had a few days off of work but tells me he was still on the statin.  Has been off the rosuvastatin and feeling much better.  Planning to follow-up on CKs.  We have gotten him on Repatha in addition to ezetimibe.    DM2.  Not controlled recently.  Referred to clinical pharmacy and working on tirzepatide.    Has HTN, controlled.   Reports cough on lisinopril, better on losartan.  Has an XL BP cuff now.  Does get mildly lightheaded at times upon standing, not limiting.    Prior covid infection.  His O2 level has dropped, cxray was ok.  Resolved, feels 70-75% back to baseline.  COVID again but recovered.  Recent norovirus requiring ED visit.    Some lower extremity edema, not new, consistent with some mild venous insufficiency.    Intermittent pains in the chest, does not remind him of prior heart disease.  Can feel like a pressure just left of the sternum.  Can last 5 to 10 minutes.  Has nitroglycerin as needed.  He is not limited by chest pain, pressure or tightness with exercise or intense activity work.   Reports mild dyspnea on exertion with cardio exercise, has not changed.  No orthopnea.  No significant palpitations.  No  presyncope/syncope.   No symptoms of leg claudication.   No stroke/TIA like symptoms.    Fathers HX not known.  No family history of premature coronary artery disease on mother's side.  No prior smoking history.  No history of hypertension.  No history of autoimmune disease such as lupus or rheumatoid arthritis.  No chronic kidney disease.     to Griselda Galvan who is here today.  Prior divorse, 3 kids with her, much stress, kids in [place].   Used to work as MA at [employer], now at [employer], again, sometimes has to help maneuver things greater than 500 pounds.     DATA REVIEWED by me:  ECG   11-4-24 Sinus 57, NS T wave changes  9-24-21 Sinus, 52    ECG 10-2-18 [place]  Sinus, 98     Echo 10-3-18 [place]  EF 60%, normal wall motion    Stress test  9/16/2022 nuclear   Normal Lexiscan myocardial perfusion study.   No evidence of ischemia or infarct.   SDS 0.   LVEF 51%   No ischemic changes with Regadenoson.   No arrhythmias.   No chest pain.   ECG INTERPRETATION   Negative stress ECG for ischemia.    [facility] 10-3-18  "[place]  95% prox LAD, [employer] [employer] GARRET, 4.5x18 mm, LVEDP 15 mmHg    MRA chest 10-15-19  MRA of the thoracic aorta within normal limits. No aneurysmal dilatation or evidence of dissection.    Most recent labs:     Lab Results   Component Value Date/Time    HEMOGLOBIN 19.0 (H) 01/18/2025 02:53 PM    HEMATOCRIT 58.1 (H) 01/18/2025 02:53 PM    MCV 84.8 01/18/2025 02:53 PM    INR 0.95 11/12/2020 08:09 AM      Lab Results   Component Value Date/Time    SODIUM 140 01/18/2025 02:53 PM    POTASSIUM 4.1 01/18/2025 02:53 PM    CHLORIDE 101 01/18/2025 02:53 PM    CO2 28 01/18/2025 02:53 PM    GLUCOSE 118 (H) 01/18/2025 02:53 PM    BUN 10 01/18/2025 02:53 PM    CREATININE 1.06 01/18/2025 02:53 PM    CREATININE 1.0 01/26/2006 01:23 PM      Lab Results   Component Value Date/Time    ASTSGOT 31 01/18/2025 02:53 PM    ALTSGPT 47 01/18/2025 02:53 PM    ALBUMIN 4.6 01/18/2025 02:53 PM      Lab Results   Component Value Date/Time    CHOLSTRLTOT 179 05/17/2024 09:33 AM     (H) 05/17/2024 09:33 AM    HDL 41 05/17/2024 09:33 AM    TRIGLYCERIDE 86 05/17/2024 09:33 AM       9-7-19 h 15.4, p 217, ha 141, k 4.1, cr 0.98, lfts normal, a1c 6.1. ldl 60, hdl 39, tg 116, tc 122    Past Medical History:   Diagnosis Date    Bowel habit changes 02/2021    constipation and diarrhea    Glaucoma     L eye    Heart burn     High cholesterol     Hypertension     Myocardial infarct (HCC)     Snoring     Tuberculosis 2008    no treatment, \"not active\"     Past Surgical History:   Procedure Laterality Date    [place] UPPER GI ENDOSCOPY,DIAGNOSIS  2/22/2021    Procedure: GASTROSCOPY;  Surgeon: Jcarlos Thomas M.D.;  Location: SURGERY [facility];  Service: EUS    EGD W/ENDOSCOPIC ULTRASOUND  2/22/2021    Procedure: EGD, WITH ENDOSCOPIC US - UPPER RADIAL LINEAR;  Surgeon: Jcarlos Thomas M.D.;  Location: SURGERY [facility];  Service: EUS    VASECTOMY  2007    FUSION, SPINE, LUMBAR, PLIF  2004    L4-S1     Family History   Problem Relation Age of Onset    Diabetes " Mother     No Known Problems Father      Social History     Socioeconomic History    Marital status:      Spouse name: Not on file    Number of children: Not on file    Years of education: Not on file    Highest education level: Associate degree: occupational, technical, or vocational program   Occupational History    Not on file   Tobacco Use    Smoking status: Never    Smokeless tobacco: Never   Vaping Use    Vaping status: Never Used   Substance and Sexual Activity    Alcohol use: Not Currently     Comment: occasional, rare    Drug use: No    Sexual activity: Yes     Partners: Female     Birth control/protection: Surgical   Other Topics Concern    Not on file   Social History Narrative    Not on file     Social Drivers of Health     Financial Resource Strain: Low Risk  (1/25/2021)    Overall Financial Resource Strain (CARDIA)     Difficulty of Paying Living Expenses: Not hard at all   Food Insecurity: No Food Insecurity (1/25/2021)    Hunger Vital Sign     Worried About Running Out of Food in the Last Year: Never true     Ran Out of Food in the Last Year: Never true   Transportation Needs: No Transportation Needs (1/25/2021)    PRAPARE - Transportation     Lack of Transportation (Medical): No     Lack of Transportation (Non-Medical): No   Physical Activity: Sufficiently Active (1/25/2021)    Exercise Vital Sign     Days of Exercise per Week: 4 days     Minutes of Exercise per Session: 120 min   Stress: Stress Concern Present (1/25/2021)    [employer] - Occupational Stress Questionnaire     Feeling of Stress : To some extent   Social Connections: Moderately Integrated (1/25/2021)    Social Connection and Isolation Panel [NHANES]     Frequency of Communication with Friends and Family: More than three times a week     Frequency of Social Gatherings with Friends and Family: Once a week     Attends Yazidi Services: More than 4 times per year     Active Member of Clubs or  Organizations: Yes     Attends Club or Organization Meetings: More than 4 times per year     Marital Status:    Intimate Partner Violence: Not on file   Housing Stability: Unknown (1/25/2021)    Housing Stability Vital Sign     Unable to Pay for Housing in the Last Year: No     Number of Places Lived in the Last Year: Not on file     Unstable Housing in the Last Year: No     Allergies   Allergen Reactions    Iodine Hives, Rash and Swelling     Topical - rash  IV contrast - full body hives and swelling    Lisinopril Cough    Pineapple Itching and Swelling     Tongue swelling and itchy throat    Shellfish Allergy Anaphylaxis    Biofreeze Rash     Rash and blisters    Cat Hair Extract Hives, Shortness of Breath, Rash, Runny Nose, Itching, Swelling and Anxiety          Latex Rash          Mushroom Extract Complex Swelling     Swelling and mouth numbness     Rosuvastatin      Myalgias, elevated CK    Atorvastatin Myalgia     Myaglias       Current Outpatient Medications   Medication Sig Dispense Refill    Tirzepatide (MOUNJARO) 2.5 MG/0.5ML Solution Auto-injector Inject 1 Pen under the skin every 7 days. 2 mL 1    sucralfate (CARAFATE) 1 GM Tab Take 1 tablet by mouth twice daily 60 Tablet 0    testosterone cypionate (DEPO-TESTOSTERONE) 200 MG/ML injection INJECT 1 ML INTRAMUSCULARLY INTO THE SHOULDER, THIGH OR BUTTOCKS ONCE EVERY 2 WEEKS.  For 90 days 6 mL 0    calcium carbonate (LAURENCE-SELTZER HEARTBURN) 750 MG chewable tablet Chew 3,000-3,750 mg every 2 hours as needed (heartburn).      Multiple Vitamins-Minerals (MENS ONE DAILY PO) Take 1 Tablet by mouth every morning.      ondansetron (ZOFRAN ODT) 4 MG TABLET DISPERSIBLE Take 1 Tablet by mouth every 8 hours as needed for Nausea/Vomiting. 8 Tablet 0    lansoprazole (PREVACID) 30 MG CAPSULE DELAYED RELEASE Take 1 Capsule by mouth every day. 90 Capsule 2    Evolocumab (REPATHA SURECLICK) 140 MG/ML Solution Auto-injector SubQ injection pen Inject 1 mL under the  "skin every 14 days. 2.1 Each 12    levothyroxine (SYNTHROID) 112 MCG Tab Take 1 Tablet by mouth every morning on an empty stomach. (Patient taking differently: Take 100 mcg by mouth every morning on an empty stomach.) 90 Tablet 3    nitroglycerin (NITROSTAT) 0.4 MG SL Tab Place 1 Tablet under the tongue as needed for Chest Pain. 25 Tablet 11    ezetimibe (ZETIA) 10 MG Tab Take 10 mg by mouth every evening.      Syringe/Needle, Disp, (SYRINGE 3CC/23GX1\") 23G X 1\" 3 ML Misc Use with testosterone every 2 weeks 6 Each 3    glucose blood strip 1 Strip by Other route every day. 100 Strip 3    aspirin EC (ECOTRIN) 81 MG Tablet Delayed Response Take 162 mg by mouth every morning. 2 tablets = 162 mg.      losartan (COZAAR) 50 MG Tab Take 1 Tablet by mouth every day. 90 Tablet 7    fexofenadine (ALLEGRA) 180 MG tablet Take 180 mg by mouth every day.       No current facility-administered medications for this visit.     ROS  All others systems reviewed and negative.     Objective:     /82 (BP Location: Left arm, Patient Position: Sitting, BP Cuff Size: Adult)   Pulse 74   Resp 18   Ht 1.791 m (5' 10.5\")   Wt 113 kg (250 lb)   SpO2 93%  Body mass index is 35.36 kg/m².    Physical Exam   General: No acute distress. Well nourished.  HEENT: EOM grossly intact, no scleral icterus, no pharyngeal erythema.   Neck:  No JVD, no bruits, trachea midline  CVS: RRR. Normal S1, S2. No M/R/G. No LE edema.  2+ radial pulses, 2+ PT pulses  Resp: CTAB. No wheezing or crackles/rhonchi. Normal respiratory effort.  Abdomen: Soft, NT, no solo hepatomegaly.  MSK/Ext: No clubbing or cyanosis.  Skin: Warm and dry, no rashes.  Neurological: CN III-XII grossly intact. No focal deficits.   Psych: A&O x 3, appropriate affect, good judgement    Physical Exam listed below was completed in entrety today and unchanged from 11-4-24 except where noted.  Some elements were copied from my note of that same day, which have been updated where " appropriate and all reflect current meedical decision making from today.  I have confirmed and edited as necessary, the PFSH and ROS obtained by others.    Written instructions given today:    -Dr. Raz Colón is highly specialized in managing cholesterol and diabetes, particularly in people with side effects to certain medications.  He can perform the same duties as a clinical pharmacist.  I do not think you need both of them so I would stay with Dr. Raz Colón.  You can cancel your appointment with [facility] and the clinical pharmacy team after you established with Raz Colón unless Dr. Colón says otherwise.    -You will always have me as your cardiologist, specifically to watch for signs and symptoms that you are blocking up in the heart arteries.    -I will send you a message when I hear back as to where exactly Dr. Colón is located.    -CK blood test today or tomorrow and again in 1 to 2 months after you start exercising.    -Mounjaro can make GERD worse.  If it becomes intolerable, you do not have to take it.    -Please try to keep your weights under 50 pounds and avoid prolonged straining.    -Please work on cardiovascular exercise, 20 to 30 minutes of something sustained such as walking or pedaling on a bicycle or an elliptical.    Return in about 3 months (around 5/3/2025).    It is my pleasure to participate in the care of Mr. Bhandari.  Please do not hesitate to contact me with questions or concerns.    Jovanna Gee MD, [facility]  Cardiologist [facility]    Please note that this dictation was created using voice recognition software. I have made every reasonable attempt to correct obvious errors, but it is possible there are errors of grammar and possibly content that I did not discover before finalizing the note.

## 2025-02-03 ENCOUNTER — OFFICE VISIT (OUTPATIENT)
Dept: CARDIOLOGY | Facility: MEDICAL CENTER | Age: 50
End: 2025-02-03
Attending: INTERNAL MEDICINE
Payer: COMMERCIAL

## 2025-02-03 ENCOUNTER — HOSPITAL ENCOUNTER (OUTPATIENT)
Dept: LAB | Facility: MEDICAL CENTER | Age: 50
End: 2025-02-03
Attending: INTERNAL MEDICINE
Payer: COMMERCIAL

## 2025-02-03 ENCOUNTER — PATIENT MESSAGE (OUTPATIENT)
Dept: CARDIOLOGY | Facility: MEDICAL CENTER | Age: 50
End: 2025-02-03

## 2025-02-03 VITALS
SYSTOLIC BLOOD PRESSURE: 118 MMHG | DIASTOLIC BLOOD PRESSURE: 82 MMHG | RESPIRATION RATE: 18 BRPM | HEART RATE: 74 BPM | BODY MASS INDEX: 35 KG/M2 | WEIGHT: 250 LBS | OXYGEN SATURATION: 93 % | HEIGHT: 71 IN

## 2025-02-03 DIAGNOSIS — E78.00 PURE HYPERCHOLESTEROLEMIA: ICD-10-CM

## 2025-02-03 DIAGNOSIS — I25.10 CORONARY ARTERY DISEASE INVOLVING NATIVE CORONARY ARTERY OF NATIVE HEART WITHOUT ANGINA PECTORIS: ICD-10-CM

## 2025-02-03 DIAGNOSIS — Z78.9 STATIN INTOLERANCE: ICD-10-CM

## 2025-02-03 DIAGNOSIS — R74.8 ELEVATED CK: ICD-10-CM

## 2025-02-03 DIAGNOSIS — E11.29 TYPE 2 DIABETES MELLITUS WITH DIABETIC MICROALBUMINURIA, WITHOUT LONG-TERM CURRENT USE OF INSULIN (HCC): ICD-10-CM

## 2025-02-03 DIAGNOSIS — I10 PRIMARY HYPERTENSION: ICD-10-CM

## 2025-02-03 DIAGNOSIS — R80.9 TYPE 2 DIABETES MELLITUS WITH DIABETIC MICROALBUMINURIA, WITHOUT LONG-TERM CURRENT USE OF INSULIN (HCC): ICD-10-CM

## 2025-02-03 DIAGNOSIS — Z88.8 ALLERGY TO IODINE: ICD-10-CM

## 2025-02-03 DIAGNOSIS — Z95.5 STENTED CORONARY ARTERY: ICD-10-CM

## 2025-02-03 DIAGNOSIS — I25.2 HISTORY OF ACUTE MYOCARDIAL INFARCTION: ICD-10-CM

## 2025-02-03 LAB — CK SERPL-CCNC: 179 U/L (ref 0–154)

## 2025-02-03 PROCEDURE — 83695 ASSAY OF LIPOPROTEIN(A): CPT

## 2025-02-03 PROCEDURE — 99214 OFFICE O/P EST MOD 30 MIN: CPT | Performed by: INTERNAL MEDICINE

## 2025-02-03 PROCEDURE — 99213 OFFICE O/P EST LOW 20 MIN: CPT | Performed by: INTERNAL MEDICINE

## 2025-02-03 PROCEDURE — 82550 ASSAY OF CK (CPK): CPT

## 2025-02-03 PROCEDURE — 3074F SYST BP LT 130 MM HG: CPT | Performed by: INTERNAL MEDICINE

## 2025-02-03 PROCEDURE — 36415 COLL VENOUS BLD VENIPUNCTURE: CPT

## 2025-02-03 PROCEDURE — 3079F DIAST BP 80-89 MM HG: CPT | Performed by: INTERNAL MEDICINE

## 2025-02-03 ASSESSMENT — FIBROSIS 4 INDEX: FIB4 SCORE: 0.89

## 2025-02-03 NOTE — PATIENT INSTRUCTIONS
-Dr. Raz Colón is highly specialized in managing cholesterol and diabetes, particularly in people with side effects to certain medications.  He can perform the same duties as a clinical pharmacist.  I do not think you need both of them so I would stay with Dr. Raz Colón.  You can cancel your appointment with [facility] and the clinical pharmacy team after you established with Raz Colón unless Dr. Colón says otherwise.    -You will always have me as your cardiologist, specifically to watch for signs and symptoms that you are blocking up in the heart arteries.    -I will send you a message when I hear back as to where exactly Dr. Colón is located.    -CK blood test today or tomorrow and again in 1 to 2 months after you start exercising.    -Mounjaro can make GERD worse.  If it becomes intolerable, you do not have to take it.    -Please try to keep your weights under 50 pounds and avoid prolonged straining.    -Please work on cardiovascular exercise, 20 to 30 minutes of something sustained such as walking or pedaling on a bicycle or an elliptical.

## 2025-02-05 LAB — LPA SERPL-MCNC: <6 MG/DL

## 2025-02-10 ENCOUNTER — HOSPITAL ENCOUNTER (OUTPATIENT)
Dept: RADIOLOGY | Facility: MEDICAL CENTER | Age: 50
End: 2025-02-10
Attending: OPHTHALMOLOGY
Payer: COMMERCIAL

## 2025-02-10 DIAGNOSIS — H05.20 EXOPHTHALMUS: ICD-10-CM

## 2025-02-10 PROCEDURE — 70480 CT ORBIT/EAR/FOSSA W/O DYE: CPT

## 2025-02-28 ENCOUNTER — TELEPHONE (OUTPATIENT)
Dept: VASCULAR LAB | Facility: MEDICAL CENTER | Age: 50
End: 2025-02-28
Payer: COMMERCIAL

## 2025-02-28 NOTE — TELEPHONE ENCOUNTER
Called patient, was not able to reach them in regards to New Patient Appointment.    Advised of Location & Time? Yes    Voicemail or MyChart? Both

## 2025-03-03 ENCOUNTER — OFFICE VISIT (OUTPATIENT)
Dept: VASCULAR LAB | Facility: MEDICAL CENTER | Age: 50
End: 2025-03-03
Attending: FAMILY MEDICINE
Payer: COMMERCIAL

## 2025-03-03 VITALS
WEIGHT: 245 LBS | DIASTOLIC BLOOD PRESSURE: 89 MMHG | HEIGHT: 71 IN | SYSTOLIC BLOOD PRESSURE: 155 MMHG | BODY MASS INDEX: 34.3 KG/M2 | HEART RATE: 59 BPM

## 2025-03-03 DIAGNOSIS — R80.9 TYPE 2 DIABETES MELLITUS WITH DIABETIC MICROALBUMINURIA, WITHOUT LONG-TERM CURRENT USE OF INSULIN (HCC): ICD-10-CM

## 2025-03-03 DIAGNOSIS — I25.2 HISTORY OF NON-ST ELEVATION MYOCARDIAL INFARCTION (NSTEMI): ICD-10-CM

## 2025-03-03 DIAGNOSIS — Z79.890 LONG-TERM CURRENT USE OF TESTOSTERONE REPLACEMENT THERAPY: Chronic | ICD-10-CM

## 2025-03-03 DIAGNOSIS — Z95.5 STENTED CORONARY ARTERY: ICD-10-CM

## 2025-03-03 DIAGNOSIS — E66.811 CLASS 1 OBESITY WITH SERIOUS COMORBIDITY AND BODY MASS INDEX (BMI) OF 34.0 TO 34.9 IN ADULT, UNSPECIFIED OBESITY TYPE: ICD-10-CM

## 2025-03-03 DIAGNOSIS — T46.6X5A MYALGIA DUE TO STATIN: Chronic | ICD-10-CM

## 2025-03-03 DIAGNOSIS — I25.10 CORONARY ARTERY DISEASE INVOLVING NATIVE CORONARY ARTERY OF NATIVE HEART WITHOUT ANGINA PECTORIS: ICD-10-CM

## 2025-03-03 DIAGNOSIS — I10 PRIMARY HYPERTENSION: ICD-10-CM

## 2025-03-03 DIAGNOSIS — K76.0 HEPATIC STEATOSIS: ICD-10-CM

## 2025-03-03 DIAGNOSIS — R74.8 ELEVATED CPK: ICD-10-CM

## 2025-03-03 DIAGNOSIS — E29.1 TESTICULAR HYPOFUNCTION: Chronic | ICD-10-CM

## 2025-03-03 DIAGNOSIS — E78.00 PRIMARY HYPERCHOLESTEROLEMIA: ICD-10-CM

## 2025-03-03 DIAGNOSIS — Z78.9 STATIN INTOLERANCE: ICD-10-CM

## 2025-03-03 DIAGNOSIS — E11.29 TYPE 2 DIABETES MELLITUS WITH DIABETIC MICROALBUMINURIA, WITHOUT LONG-TERM CURRENT USE OF INSULIN (HCC): ICD-10-CM

## 2025-03-03 DIAGNOSIS — M79.10 MYALGIA DUE TO STATIN: Chronic | ICD-10-CM

## 2025-03-03 PROBLEM — E66.01 CLASS 2 SEVERE OBESITY WITH SERIOUS COMORBIDITY AND BODY MASS INDEX (BMI) OF 35.0 TO 35.9 IN ADULT, UNSPECIFIED OBESITY TYPE (HCC): Status: ACTIVE | Noted: 2025-03-03

## 2025-03-03 PROBLEM — E66.812 CLASS 2 SEVERE OBESITY WITH SERIOUS COMORBIDITY AND BODY MASS INDEX (BMI) OF 35.0 TO 35.9 IN ADULT, UNSPECIFIED OBESITY TYPE (HCC): Status: ACTIVE | Noted: 2025-03-03

## 2025-03-03 PROCEDURE — 99212 OFFICE O/P EST SF 10 MIN: CPT

## 2025-03-03 PROCEDURE — 99204 OFFICE O/P NEW MOD 45 MIN: CPT | Performed by: FAMILY MEDICINE

## 2025-03-03 RX ORDER — TELMISARTAN 80 MG/1
80 TABLET ORAL
Qty: 100 TABLET | Refills: 3 | Status: SHIPPED | OUTPATIENT
Start: 2025-03-03 | End: 2025-03-04

## 2025-03-03 ASSESSMENT — ENCOUNTER SYMPTOMS
FEVER: 0
CHILLS: 0
SHORTNESS OF BREATH: 0
BRUISES/BLEEDS EASILY: 0
SPUTUM PRODUCTION: 0
PALPITATIONS: 0
BLOOD IN STOOL: 0
ORTHOPNEA: 0
COUGH: 0
PND: 0
WHEEZING: 0
CLAUDICATION: 0
HEMOPTYSIS: 0

## 2025-03-03 ASSESSMENT — FIBROSIS 4 INDEX: FIB4 SCORE: 0.89

## 2025-03-03 NOTE — PROGRESS NOTES
INITIAL FAMILY LIPID CLINIC VISIT   25     Giuseppe VERA Thony Swift, intially referred for evaluation/mgmt of dyslipidemia, est 3/2025  Referral Source: Jovanna Gee MD (cardiology)    Subjective    Initial visit history: hx of 2018 NSTEMI with GARRET placement, CAD. Had increase CPK on prior statins and stopped.  Has established with pharmacotherapy clinic and initiated Repatha with good tolerance.  Has stopped zetia currently.  No myalgias.    Has ongoing care for hypothyroidism, recent CT orbit with mild proptosis bilat  Started on TRT due to low testos per PCP.     PERTINENT HLD PMHX  Age at Initial Dx: 42 (but unsure if prior to that)    Prior dyslipidemia genotyping? no  Baseline Lipids Prior to Treatment:   Lab Results   Component Value Date/Time    CHOLSTRLTOT 179 2024 09:33 AM     (H) 2024 09:33 AM    HDL 41 2024 09:33 AM    TRIGLYCERIDE 86 2024 09:33 AM       HX OF MAJOR ASCVD:   # hx of NSTEMI (2018), s/p GARRET placement - stable, no current sx, seeing cardiology    OTHER ESTABLISHED CVD: None  Secondary contributors/causes of dyslipidemia: MetS, T2D, insulin resistance    Previous lipid-lowering meds and outcome:  Statin: atorva, rosuva  Outcome: other myalgia, elevated CPK, improved with cessation   Non-Statin: none  Outcome: not applicable    CURRENT MED MGMT  Current Rx:   Statin: None  Non-Statin: repatha   Supplements: None  Side effects? no  Adherence? yes    LIFESTYLE MGMT  Barriers to care/SDOH: none  Tobacco:  reports that he has never smoked. He has never used smokeless tobacco.   Change in weight:  improved on Mounjaro   Exercise habits: sporadic irregular exercise  Dietary patterns: working towards Med style   Etoh: see sochx    OTHER CV RISK FACTORS:  HTN: Stable, tolerating meds with good adherence, Home BP los/80s  Dysglycemia: yes, T2D, seeing pharm DM clinic.  Pending with endocrinology.      ANTITHROMBOTIC TX: ASA 81mg - no hx of bleeding         Patient Active Problem List   Diagnosis    Coronary artery disease involving native coronary artery of native heart without angina pectoris    Pure hypercholesterolemia    Low HDL (under 40)    Primary hypertension    Obesity (BMI 30-39.9)    GERD (gastroesophageal reflux disease)    Acquired hypothyroidism    Type 2 diabetes mellitus with diabetic microalbuminuria, without long-term current use of insulin (HCC)    Stress    Chronic fatigue    Chest pain    Wellness examination    Seasonal allergic rhinitis due to pollen    Low testosterone in male    History of non-ST elevation myocardial infarction (NSTEMI)    Stented coronary artery    Hepatic steatosis    Elevated CPK    Class 2 severe obesity with serious comorbidity and body mass index (BMI) of 35.0 to 35.9 in adult, unspecified obesity type (HCC)    Statin intolerance    Myalgia due to statin    Testicular hypofunction    Long-term current use of testosterone replacement therapy      has a past surgical history that includes fusion, spine, lumbar, plif (2004); vasectomy (2007); pr upper gi endoscopy,diagnosis (2/22/2021); and egd w/endoscopic ultrasound (2/22/2021).  Current Outpatient Medications on File Prior to Visit   Medication Sig Dispense Refill    Tirzepatide (MOUNJARO) 2.5 MG/0.5ML Solution Auto-injector Inject 1 Pen under the skin every 7 days. 2 mL 1    sucralfate (CARAFATE) 1 GM Tab Take 1 tablet by mouth twice daily 60 Tablet 0    testosterone cypionate (DEPO-TESTOSTERONE) 200 MG/ML injection INJECT 1 ML INTRAMUSCULARLY INTO THE SHOULDER, THIGH OR BUTTOCKS ONCE EVERY 2 WEEKS.  For 90 days 6 mL 0    calcium carbonate (LAURENCE-SELTZER HEARTBURN) 750 MG chewable tablet Chew 3,000-3,750 mg every 2 hours as needed (heartburn).      Multiple Vitamins-Minerals (MENS ONE DAILY PO) Take 1 Tablet by mouth every morning.      ondansetron (ZOFRAN ODT) 4 MG TABLET DISPERSIBLE Take 1 Tablet by mouth every 8 hours as needed for Nausea/Vomiting. 8 Tablet 0     "lansoprazole (PREVACID) 30 MG CAPSULE DELAYED RELEASE Take 1 Capsule by mouth every day. 90 Capsule 2    Evolocumab (REPATHA SURECLICK) 140 MG/ML Solution Auto-injector SubQ injection pen Inject 1 mL under the skin every 14 days. 2.1 Each 12    levothyroxine (SYNTHROID) 112 MCG Tab Take 1 Tablet by mouth every morning on an empty stomach. (Patient taking differently: Take 100 mcg by mouth every morning on an empty stomach.) 90 Tablet 3    nitroglycerin (NITROSTAT) 0.4 MG SL Tab Place 1 Tablet under the tongue as needed for Chest Pain. 25 Tablet 11    ezetimibe (ZETIA) 10 MG Tab Take 10 mg by mouth every evening.      Syringe/Needle, Disp, (SYRINGE 3CC/23GX1\") 23G X 1\" 3 ML Misc Use with testosterone every 2 weeks 6 Each 3    glucose blood strip 1 Strip by Other route every day. 100 Strip 3    aspirin EC (ECOTRIN) 81 MG Tablet Delayed Response Take 162 mg by mouth every morning. 2 tablets = 162 mg.      fexofenadine (ALLEGRA) 180 MG tablet Take 180 mg by mouth every day.       No current facility-administered medications on file prior to visit.      Allergies   Allergen Reactions    Iodine Hives, Rash and Swelling     Topical - rash  IV contrast - full body hives and swelling    Lisinopril Cough    Pineapple Itching and Swelling     Tongue swelling and itchy throat    Shellfish Allergy Anaphylaxis    Biofreeze Rash     Rash and blisters    Cat Hair Extract Hives, Shortness of Breath, Rash, Runny Nose, Itching, Swelling and Anxiety          Latex Rash          Mushroom Extract Complex Swelling     Swelling and mouth numbness     Rosuvastatin      Myalgias, elevated CK    Atorvastatin Myalgia     Myaglias      Family History   Problem Relation Age of Onset    Diabetes Mother     No Known Problems Father     Diabetes Sister         T2    Accidental Death Brother      Social History     Tobacco Use    Smoking status: Never    Smokeless tobacco: Never   Vaping Use    Vaping status: Never Used   Substance Use Topics    " "Alcohol use: Not Currently     Comment: occasional, rare    Drug use: No     Review of Systems   Constitutional:  Negative for chills, fever and malaise/fatigue.   HENT:  Negative for nosebleeds.    Respiratory:  Negative for cough, hemoptysis, sputum production, shortness of breath and wheezing.    Cardiovascular:  Negative for chest pain, palpitations, orthopnea, claudication, leg swelling and PND.   Gastrointestinal:  Negative for blood in stool and melena.   Genitourinary:  Negative for hematuria.   Endo/Heme/Allergies:  Does not bruise/bleed easily.         Objective    Vitals:    03/03/25 1105 03/03/25 1112   BP: (!) 158/89 (!) 155/89   BP Location: Left arm Left arm   Patient Position: Sitting Sitting   BP Cuff Size: Thigh Thigh   Pulse: (!) 53 (!) 59   Weight: 111 kg (245 lb)    Height: 1.791 m (5' 10.5\")      BP Readings from Last 5 Encounters:   03/03/25 (!) 155/89   02/03/25 118/82   01/18/25 137/84   01/18/25 (!) 156/88   12/17/24 (!) 130/98     BMI Readings from Last 1 Encounters:   03/03/25 34.66 kg/m²     Wt Readings from Last 3 Encounters:   03/03/25 111 kg (245 lb)   02/03/25 113 kg (250 lb)   01/18/25 113 kg (249 lb 1.9 oz)     Physical Exam  Constitutional:       General: He is not in acute distress.     Appearance: Normal appearance. He is not diaphoretic.   HENT:      Head: Normocephalic and atraumatic.   Eyes:      Conjunctiva/sclera: Conjunctivae normal.   Cardiovascular:      Rate and Rhythm: Normal rate and regular rhythm.      Pulses:           Carotid pulses are 2+ on the right side and 2+ on the left side.       Radial pulses are 2+ on the right side and 2+ on the left side.        Dorsalis pedis pulses are 2+ on the right side and 2+ on the left side.        Posterior tibial pulses are 2+ on the right side and 2+ on the left side.      Heart sounds: Normal heart sounds.   Pulmonary:      Effort: Pulmonary effort is normal.      Breath sounds: Normal breath sounds.   Musculoskeletal:      " "Right lower leg: No edema.      Left lower leg: No edema.   Skin:     General: Skin is warm and dry.   Neurological:      Mental Status: He is alert and oriented to person, place, and time.      Cranial Nerves: No cranial nerve deficit.      Gait: Gait is intact.   Psychiatric:         Mood and Affect: Mood and affect normal.       DATA REVIEW:  Most Recent Lipid Panel:   Lab Results   Component Value Date/Time    CHOLSTRLTOT 179 05/17/2024 09:33 AM     (H) 05/17/2024 09:33 AM    HDL 41 05/17/2024 09:33 AM    TRIGLYCERIDE 86 05/17/2024 09:33 AM     Lab Results   Component Value Date/Time     (H) 05/17/2024 09:33 AM    LDL 99 10/24/2023 09:18 AM    LDL 84 08/14/2020 09:35 AM    LDL 84 08/14/2020 09:29 AM    LDL 60 09/07/2019 09:58 AM      Lab Results   Component Value Date/Time    LIPOPROTA <6 02/03/2025 02:19 PM      No results found for: \"APOB\"   No results found for: \"CRPHIGHSEN\"       Latest Reference Range & Units 11/05/24 12:01 11/26/24 12:12 12/26/24 14:57 02/03/25 14:19   CPK Total 0 - 154 U/L 424 (H) 282 (H) 186 (H) 179 (H)     Other Pertinent Blood Work:   Lab Results   Component Value Date    SODIUM 140 01/18/2025    POTASSIUM 4.1 01/18/2025    CHLORIDE 101 01/18/2025    CO2 28 01/18/2025    ANION 11.0 01/18/2025    GLUCOSE 118 (H) 01/18/2025    BUN 10 01/18/2025    CREATININE 1.06 01/18/2025    CALCIUM 9.5 01/18/2025    ASTSGOT 31 01/18/2025    ALTSGPT 47 01/18/2025    ALKPHOSPHAT 69 01/18/2025    TBILIRUBIN 0.5 01/18/2025    ALBUMIN 4.6 01/18/2025    AGRATIO 1.5 01/18/2025    TSHULTRASEN 5.660 (H) 12/26/2024    CPKTOTAL 179 (H) 02/03/2025     Lab Results   Component Value Date/Time    HBA1C 7.6 (H) 10/21/2024 12:00 AM    HBA1C 6.1 (H) 05/03/2024 09:12 AM    HBA1C 5.7 (H) 01/22/2024 01:11 PM      Lab Results   Component Value Date/Time    MALBCRT 136 (H) 05/03/2024 09:11 AM    MICROALBUR 18.1 05/03/2024 09:11 AM       Latest Reference Range & Units 10/21/24 00:00 12/26/24 15:02   TSH 0.380 - " 5.330 uIU/mL 7.580 (H) 5.660 (H)   Free T-4 0.93 - 1.70 ng/dL  1.02      Latest Reference Range & Units 07/16/24 09:23 10/21/24 10:14   Testosterone,Total 175 - 781 ng/dL 1288 (H) 598       IMAGING:    Echo 10-3-18 MT  EF 60%, normal wall motion    MRA chest 10-15-19  MRA of the thoracic aorta within normal limits. No aneurysmal dilatation or evidence of dissection    2020 US abd   Hepatic steatosis without evidence of biliary dilatation or mass   Pancreas and aorta are obscured by bowel gas    9/2022 NM stress    NUCLEAR IMAGING INTERPRETATION   Normal Lexiscan myocardial perfusion study.   No evidence of ischemia or infarct.   SDS 0.   LVEF 51%   No ischemic changes with Regadenoson.   No arrhythmias.   No chest pain.   ECG INTERPRETATION   Negative stress ECG for ischemia.     1/2025 CT a/p with   Adrenal glands: Normal.   Kidneys: Unremarkable without hydronephrosis.  Vasculature: Unremarkable.  IMPRESSION:  No acute findings.    2/10/25 CT orbits   1. Mild proptosis bilaterally.  2. 1.4 cm left maxillary sinus mucosal retention cyst.  3. Nasal septum is deviated to the right.      PROCEDURES:     LHC 10-3-18 MT  95% prox LAD, [facility] [place] GARRET, 4.5x18 mm, LVEDP 15 mmHg             ASSESSMENT AND PLAN  1. Primary hypercholesterolemia  APOLIPOPROTEIN B    Comp Metabolic Panel    CRP HIGH SENSITIVE (CARDIAC)    Lipid Profile      2. Type 2 diabetes mellitus with diabetic microalbuminuria, without long-term current use of insulin (HCC)        3. Class 2 severe obesity with serious comorbidity and body mass index (BMI) of 35.0 to 35.9 in adult, unspecified obesity type (HCC)        4. Coronary artery disease involving native coronary artery of native heart without angina pectoris  CRP HIGH SENSITIVE (CARDIAC)      5. History of non-ST elevation myocardial infarction (NSTEMI)        6. Stented coronary artery        7. Primary hypertension  telmisartan (MICARDIS) 80 MG Tab    ALDOSTERONE    MICROALBUMIN CREAT RATIO  URINE    RENIN ACTIVITY      8. Hepatic steatosis        9. Elevated CPK  CREATINE KINASE      10. Statin intolerance        11. Myalgia due to statin  CREATINE KINASE      12. Testicular hypofunction        13. Long-term current use of testosterone replacement therapy          PATIENT TYPE: Secondary Prevention    MAJOR ASCVD:     1) NSTEMI (10/2018) in [facility] 95% prox LAD that was treated with a 4.5 x 18 mm drug-eluting stent.    - stable, no sx   Plan  - continue secondary prevention treatment goals, intensive med mgmt and lifestyle interventions   - ongoing care with cardiology     OTHER ESTABLISHED CVD: None    EVIDENCE OF GENETIC DYSLIPIDEMIA: Yes   - on prior rosuva + zetia had LDL-C = 121, based upon presumed 70% reduction his calculated baseline LDL-C is around 205 in the heFH range   - premature NSTEMI age 42    FH / LIPIDEMIA genotyping: YES, gave HN number to get free FH genotyping   - consider invitae  Benefits/limitations/risks for dyslipidemic genetic testing reviewed.  Further resources for review available at [email or web address] site    Indications and rationale for dyslipidemia genetic testing:  -Strong clinical suspicion of a genetic dyslipidemia.  - assist with definitive diagnosis and aid in treatment decision-making  -Strong family history of dyslipidemia or its complications.  -Presence of related syndromic features  -Evidence that testing might .  -Available and effective early interventions exist.  -Eligibility for new or investigational drugs.  -Patient preference.  -Family planning  - informing need for cascade screening of family members     Benefits include:   -Confirmation of a clinical diagnosis of FH, especially in cases where it is not clear whether the person has FH or not.  -Provides more information about one’s risk or diagnosis, since not all individuals with FH present the same.  -Often results in initiation and intensification of therapy by a healthcare provider.  Studies have also shown that individuals with FH are more willing to start, intensify or continue taking prescribed medications when given genetic confirmation.  -Provides information regarding why a healthy lifestyle and diet have not been able to control cholesterol levels on their own.  -Helps other family members to be screened.  -Determines whether or not FH has been passed down to a child, since everyone with -FH has a 50% chance of doing so.    APOE genotyping (for familial dysbetalipoproteinemia eval): NO    SECONDARY CAUSES / CONTRIBUTORS: yes  Metabolic:  BMI >30, T2D, IR, MetS  Thyroid disease: TSH still elevated with ongoing titration perPCP   Liver disease: none reported   Renal disease/nephrotic syndrome:  none reported  Dietary-induced (ketogenic, lean mass hyper-responder)? no  Medications: None  Alcohol use: no    ACC/AHA INDICATION FOR STATIN THERAPY:  Secondary Prevention - Very high risk ASCVD - 2 major events or 1 event with other high-risk conditions and Primary Severe HLD / FH (baseline LDL-C >190)    OTHER SIGNIFICANT RISK MARKERS:  High-risk conditions: Diabetes  and Hypertension   Lipoprotein(a): Favorable (<30 mg/dl or <75 nmol/L    - additional advanced biomarkers ordered     TARGETS / GOALS (per most recent ACC/AHA guidelines):  LDL-C <55 mg/dl  apoB <60 mg/dl  At goals? No recent labs since on repatha     LIFESTYLE INTERVENTIONS  TOBACCO:  reports that he has never smoked. He has never used smokeless tobacco.   - continued complete avoidance of all tobacco products   PHYSICAL ACTIVITY: at least 150 min per week of moderate intensity  NUTRITION: Mediterranean and reduce Na to <1,500mg/day   ETOH: to limit to occasional social use  WT MGMT: focus on 5-7% reduction as initial goal  (1kg loss reduces SBP by 1 mmHg)  - consider use of various wt reducing interventions and average wt loss potential:   Baseline diet/exercise (5-7+%)   Plus low intensity (phentermine, qsymia, contrave - 5-10+%)    And/or high-intensity meds (liraglutide (5-10%), semaglutide (12-15%), tirzepatide (15-20%))   And/org wt-reducing surgery (20-30+%)  - suggested for ongoing f/u with PCP to review above     LIPID-LOWERING MEDICATION MANAGEMENT:     Statin Therapy: COMPLETE INTOLERANCE   Would not rechallenge at this time due to CPK elevations with myalgias on rosuva and atorvastatin     Non-Statin Therapies:     LDL/apoB therapies:  EZETIMIBE: holding for now, may restart   BEMPEDOIC ACID: not currently indicated   BAS: not currently indicated   PCSK9 mAb: continue repatha 140mg Q2 weeks - reviewed use, ADRs and expected LDL reduction of 60+% to achieve LDL goals   INCLISIRAN: not indicated     TG therapies: not currently indicated     LP(a) modifying therapy: n/a     PCSK9i strategy indications:   PSCK9i indicated per 2018 ACC/AHA guidelines in this patient with established ASCVD whose LDL-C remains above threshold of 70 mg/dl despite maximally tolerated statin therapy.  PSCK9i indicated per 2018 ACC/AHA guidelines in this patient with clinical diagnosis of familial hypercholesterolemia (FH) phenotype whose LDL-C remains above threshold of 100 mg/dl despite maximally tolerated statin therapy.     RECOMMENDED SUPPLEMENTS: None     APHERESIS: n/a     BLOOD PRESSURE CONTROL   Office BP goal per ACC/AHA <130/80  Home BP at goal:  no  Office BP at goal:  no  24h ABPM:  not ordered to date   RDN candidate? YES  Contributing factors: BMI, T2D   Plan:   - start/continue home BP monitoring, reviewed correct technique, provide BP log and instructions  - order 24h ABPM:  NO  - routine monitoring of lytes/gfr   Medications:  - stop losartan, start telmisartan 80mg daily - best for MetS    GLYCEMIC STATUS/MGMT: Diabetic, T2 with CAD  Goal A1c < 7.0  Lab Results   Component Value Date/Time    HBA1C 7.6 (H) 10/21/2024 12:00 AM    HBA1C 6.1 (H) 05/03/2024 09:12 AM    HBA1C 5.7 (H) 01/22/2024 01:11 PM       Lab Results   Component Value  Date/Time    MALBCRT 136 (H) 05/03/2024 09:11 AM    GERALDOALJORDI 18.1 05/03/2024 09:11 AM     Plan:  - continue current medication plan, defer mgmt to pharm DM clinic or endocrine   - recommmend for routine care with PCP (or endocrine) to include regular A1c monitoring, annual albumin/creatinine ratio (ACR), annual diabetic retinopathy screening, foot exams, annual flu vaccine, and updates to pneumonia vaccines as appropriate      ANTITHROMBOTIC THERAPY: Yes , continue ASA 81mg daily     OTHER    # hypothyroidism, stable - continue current treatment plan, defer mgmt to PCP/endocrine      # testicular hypofunction on TRT   - reviewed latest CV safety from TRAVERSE trial indicating likely CV safety with use of topical testosterone gel 1.62% if maintain total T between 350-750 ng/dl, acknowledging there was a 61% treatment discontinuation rate in both treatment and placebo groups and 21% trial withdrawal rate that could potentially bias study results/conclusions. Additionally, advised pt these findings cannot be extrapolated to men using other testosterone formulations or whom exceed >750 ng/dl.  Statistically significantly higher risk for AF, PE, and MARIA DE JESUS remained in treatment group compared to placebo group  Plan  - defer mgmt and surveillance to prescriber, and highly encouraged to review the above data and trial with prescriber for continued informed shared decision-making on benefits/risks for TRT      STUDIES:  none   LABS: as noted above  FOLLOW-UP: 6 weeks     Raz Colón M.D.  JROGE, board-certified Clinical Lipidologist   [facility]   [facility]   [phone]      CC:  ZENIA Reid III, Laura E J, M.*

## 2025-03-04 RX ORDER — TELMISARTAN 80 MG/1
TABLET ORAL
Qty: 100 TABLET | Refills: 3 | Status: SHIPPED | OUTPATIENT
Start: 2025-03-04

## 2025-03-20 RX ORDER — TIRZEPATIDE 5 MG/.5ML
5 INJECTION, SOLUTION SUBCUTANEOUS
Qty: 2 ML | Refills: 1 | Status: SHIPPED | OUTPATIENT
Start: 2025-03-20

## 2025-03-21 NOTE — PROGRESS NOTES
Patient tolerating Mounjaro 2.5 mg weekly. Will increase to 5 mg weekly. Rx sent to patient's preferred pharmacy.    William Macedo, PharmD, BCACP

## 2025-03-26 ENCOUNTER — HOSPITAL ENCOUNTER (OUTPATIENT)
Dept: LAB | Facility: MEDICAL CENTER | Age: 50
End: 2025-03-26
Attending: FAMILY MEDICINE
Payer: COMMERCIAL

## 2025-03-26 DIAGNOSIS — R89.9 ABNORMAL LABORATORY TEST: ICD-10-CM

## 2025-03-26 DIAGNOSIS — E03.9 ACQUIRED HYPOTHYROIDISM: ICD-10-CM

## 2025-03-26 DIAGNOSIS — E11.29 TYPE 2 DIABETES MELLITUS WITH DIABETIC MICROALBUMINURIA, WITHOUT LONG-TERM CURRENT USE OF INSULIN (HCC): ICD-10-CM

## 2025-03-26 DIAGNOSIS — R80.9 TYPE 2 DIABETES MELLITUS WITH DIABETIC MICROALBUMINURIA, WITHOUT LONG-TERM CURRENT USE OF INSULIN (HCC): ICD-10-CM

## 2025-03-26 LAB
BASOPHILS # BLD AUTO: 0.7 % (ref 0–1.8)
BASOPHILS # BLD: 0.06 K/UL (ref 0–0.12)
CREAT UR-MCNC: 135 MG/DL
EOSINOPHIL # BLD AUTO: 0.09 K/UL (ref 0–0.51)
EOSINOPHIL NFR BLD: 1.1 % (ref 0–6.9)
ERYTHROCYTE [DISTWIDTH] IN BLOOD BY AUTOMATED COUNT: 43.3 FL (ref 35.9–50)
EST. AVERAGE GLUCOSE BLD GHB EST-MCNC: 143 MG/DL
GLUCOSE SERPL-MCNC: 97 MG/DL (ref 65–99)
HBA1C MFR BLD: 6.6 % (ref 4–5.6)
HCT VFR BLD AUTO: 59.9 % (ref 42–52)
HGB BLD-MCNC: 19 G/DL (ref 14–18)
IMM GRANULOCYTES # BLD AUTO: 0.03 K/UL (ref 0–0.11)
IMM GRANULOCYTES NFR BLD AUTO: 0.4 % (ref 0–0.9)
LYMPHOCYTES # BLD AUTO: 2.66 K/UL (ref 1–4.8)
LYMPHOCYTES NFR BLD: 31.6 % (ref 22–41)
MCH RBC QN AUTO: 26.7 PG (ref 27–33)
MCHC RBC AUTO-ENTMCNC: 31.7 G/DL (ref 32.3–36.5)
MCV RBC AUTO: 84.1 FL (ref 81.4–97.8)
MICROALBUMIN UR-MCNC: 54.5 MG/DL
MICROALBUMIN/CREAT UR: 404 MG/G (ref 0–30)
MONOCYTES # BLD AUTO: 0.75 K/UL (ref 0–0.85)
MONOCYTES NFR BLD AUTO: 8.9 % (ref 0–13.4)
NEUTROPHILS # BLD AUTO: 4.83 K/UL (ref 1.82–7.42)
NEUTROPHILS NFR BLD: 57.3 % (ref 44–72)
NRBC # BLD AUTO: 0 K/UL
NRBC BLD-RTO: 0 /100 WBC (ref 0–0.2)
PLATELET # BLD AUTO: 233 K/UL (ref 164–446)
PMV BLD AUTO: 10.5 FL (ref 9–12.9)
RBC # BLD AUTO: 7.12 M/UL (ref 4.7–6.1)
TSH SERPL DL<=0.005 MIU/L-ACNC: 2.96 UIU/ML (ref 0.38–5.33)
WBC # BLD AUTO: 8.4 K/UL (ref 4.8–10.8)

## 2025-03-26 PROCEDURE — 84443 ASSAY THYROID STIM HORMONE: CPT

## 2025-03-26 PROCEDURE — 82570 ASSAY OF URINE CREATININE: CPT

## 2025-03-26 PROCEDURE — 82947 ASSAY GLUCOSE BLOOD QUANT: CPT

## 2025-03-26 PROCEDURE — 83036 HEMOGLOBIN GLYCOSYLATED A1C: CPT

## 2025-03-26 PROCEDURE — 85025 COMPLETE CBC W/AUTO DIFF WBC: CPT

## 2025-03-26 PROCEDURE — 36415 COLL VENOUS BLD VENIPUNCTURE: CPT

## 2025-03-26 PROCEDURE — 82043 UR ALBUMIN QUANTITATIVE: CPT

## 2025-03-27 ENCOUNTER — RESULTS FOLLOW-UP (OUTPATIENT)
Dept: MEDICAL GROUP | Facility: PHYSICIAN GROUP | Age: 50
End: 2025-03-27

## 2025-03-31 ENCOUNTER — OFFICE VISIT (OUTPATIENT)
Dept: MEDICAL GROUP | Facility: PHYSICIAN GROUP | Age: 50
End: 2025-03-31
Payer: COMMERCIAL

## 2025-03-31 ENCOUNTER — APPOINTMENT (OUTPATIENT)
Dept: MEDICAL GROUP | Facility: PHYSICIAN GROUP | Age: 50
End: 2025-03-31
Payer: COMMERCIAL

## 2025-03-31 VITALS
OXYGEN SATURATION: 96 % | WEIGHT: 244.3 LBS | RESPIRATION RATE: 18 BRPM | SYSTOLIC BLOOD PRESSURE: 130 MMHG | BODY MASS INDEX: 34.2 KG/M2 | HEIGHT: 71 IN | DIASTOLIC BLOOD PRESSURE: 74 MMHG | TEMPERATURE: 99.2 F | HEART RATE: 74 BPM

## 2025-03-31 DIAGNOSIS — D58.2 ELEVATED HEMOGLOBIN (HCC): ICD-10-CM

## 2025-03-31 DIAGNOSIS — E11.29 TYPE 2 DIABETES MELLITUS WITH DIABETIC MICROALBUMINURIA, WITHOUT LONG-TERM CURRENT USE OF INSULIN (HCC): ICD-10-CM

## 2025-03-31 DIAGNOSIS — K21.9 GASTROESOPHAGEAL REFLUX DISEASE, UNSPECIFIED WHETHER ESOPHAGITIS PRESENT: ICD-10-CM

## 2025-03-31 DIAGNOSIS — R80.9 TYPE 2 DIABETES MELLITUS WITH DIABETIC MICROALBUMINURIA, WITHOUT LONG-TERM CURRENT USE OF INSULIN (HCC): ICD-10-CM

## 2025-03-31 DIAGNOSIS — E03.9 ACQUIRED HYPOTHYROIDISM: ICD-10-CM

## 2025-03-31 DIAGNOSIS — E29.1 TESTICULAR HYPOFUNCTION: ICD-10-CM

## 2025-03-31 PROBLEM — R79.89 LOW TESTOSTERONE IN MALE: Status: RESOLVED | Noted: 2024-05-07 | Resolved: 2025-03-31

## 2025-03-31 PROBLEM — R07.9 CHEST PAIN: Status: RESOLVED | Noted: 2022-09-15 | Resolved: 2025-03-31

## 2025-03-31 RX ORDER — LEVOTHYROXINE SODIUM 112 UG/1
100 TABLET ORAL
Status: CANCELLED | OUTPATIENT
Start: 2025-03-31

## 2025-03-31 ASSESSMENT — FIBROSIS 4 INDEX: FIB4 SCORE: 0.95

## 2025-03-31 ASSESSMENT — PATIENT HEALTH QUESTIONNAIRE - PHQ9: CLINICAL INTERPRETATION OF PHQ2 SCORE: 0

## 2025-03-31 NOTE — ASSESSMENT & PLAN NOTE
This is a chronic problem.  His TSH is now within normal limits.  Patient's eye doctor has noted some mild proptosis and had a CT scan done that did not show anything too significant.  His eye doctor is concerned that he may need to be on an IV medication to help with this.  Will continue to follow.

## 2025-03-31 NOTE — PROGRESS NOTES
Patient Consult Note - Follow Up Visit  Primary care physician: Manjeet Moyer III, M.D.    Reason for consult: Management of Uncontrolled Type 2 Diabetes    Time IN:  1:53pm  Time OUT:  2:13pm    HPI:  Giuseppe Swift is a 49 y.o. old patient who comes in today for evaluation of above stated problem.    Most Recent HbA1c:   Lab Results   Component Value Date/Time    HBA1C 6.6 (H) 03/26/2025 10:51 AM      Reliable and Exact Care Diabetes Score    Displays the Diabetes REC Score.  A patient gets 1 point for each measure for a maximum of 7 points   2  Measures Not Met      Factor Value   REC DM SCORE - Most recent BP is within the last year and below 140/90 Not Met   - Systolic  (3/3/2025)   - Diastolic BP 89 (3/3/2025)   REC DM SCORE - LDL has been performed in the last year and is below 100 Not Met   - LDL calculated 121 (5/17/2024)               Current Diabetes Regimen:  GLP1a/GIP:  Mounjaro 5mg SQ once weekly    Previously attempted medications:  Rybelsus - stopped by previous PCP outside of [facility]  Metformin - GI distress  Glimepiride - stopped on own accord    Discussed FBG goal of , 2hrPP <180, and A1c <7.0%.  Before Breakfast:  143, 99, 114, some 80s even mid-70s  Before Lunch:  Before Dinner:  Before Bedtime:  Other times:  Hypoglycemia:  None    Lifestyle:  No appreciable changes to nutrition habits, other than timing of first meal of the day.  Continues to integrate good amount of protein and fruit throughout the day.  Has got back to consistent exercise with plans to start his normal 90 day rotation for strength training.    Previous visit notes:  1/2025  Has been working to incorporate more protein to nutrition habits and minimize carbs.  He and wife unfortunately suffered a GI illness that caused good amount of nausea, vomiting, and diarrhea.  Was eating bland, carb heavy foods during that time, but is now back to normal eating habits.  Noted appetite suppression with  "Mounjaro.  Cleared to return to resistance training Feb 1st.    12/2024  Current Exercise - None at this time as directed by cardiology  Exercise Goal - Would highly benefit from at least 150-180 min/week moderate intensity exercise.  Previous hx of resistance training on regular basis along with cardio work.  With recent CPK elevations and associated myalgias, has been instructed to holding off on exercise until levels return to baseline.     Nutrition -   Has good understanding of nutrition associated with diabetes and cardiovascular health.  Patient states that he does not necessarily \"cut anything out\" as he look for good mix of macronutrients.  He states that he and wife focus on one true meal per day, but admits that he can get into a good deal of snacking throughout the day.  Does admit to chips, crackers, good deal of fruits, yogurts as part of snacking.  Portions seem to be under good control.    Preventative Management  BP regimen (ACEi/ARB): Losartan 50mg QD  BP <140/90: Yes  Statin:  Repatha 140mg SQ every 14 days  LDL <100: No, labs ordered today  Lab Results   Component Value Date/Time    CHOLSTRLTOT 179 05/17/2024 09:33 AM     (H) 05/17/2024 09:33 AM    HDL 41 05/17/2024 09:33 AM    TRIGLYCERIDE 86 05/17/2024 09:33 AM       Last Microalbumin/Cr:  Lab Results   Component Value Date/Time    MALBCRT 404 (H) 03/26/2025 10:54 AM    MICROALBUR 54.5 03/26/2025 10:54 AM     Last A1c:  Lab Results   Component Value Date/Time    HBA1C 6.6 (H) 03/26/2025 10:51 AM      Monofilament exam: will need to conduct at next visit      REC DM Score: 2  Care gaps addressed:   LDL is above 100: Optimized lipid medications/management, set to have labs done next weeek  BP >140: unable to address as BP machine malfunctioning today.   Care gaps updated in Health Maintenance    ROS:  Constitutional: No weight loss  Cardiac: No palpitations or racing heart  Resp: No shortness of breath  Neuro: No numbness or tinging in " feet  Endo: No heat or cold intolerance, no polyuria or polydipsia  All other systems were reviewed and were negative.    Past Medical History:  Patient Active Problem List    Diagnosis Date Noted    History of non-ST elevation myocardial infarction (NSTEMI) 03/03/2025    Stented coronary artery 03/03/2025    Hepatic steatosis 03/03/2025    Elevated CPK 03/03/2025    Class 2 severe obesity with serious comorbidity and body mass index (BMI) of 35.0 to 35.9 in adult, unspecified obesity type (HCC) 03/03/2025    Statin intolerance 03/03/2025    Myalgia due to statin 03/03/2025    Testicular hypofunction 03/03/2025    Long-term current use of testosterone replacement therapy 03/03/2025    Seasonal allergic rhinitis due to pollen 05/07/2024    Low testosterone in male 05/07/2024    Wellness examination 10/24/2023    Chest pain 09/15/2022    Chronic fatigue 09/28/2021    Stress 01/25/2021    Acquired hypothyroidism 09/11/2019    Type 2 diabetes mellitus with diabetic microalbuminuria, without long-term current use of insulin ([facility]) 09/11/2019    Obesity (BMI 30-39.9) 08/16/2019    GERD (gastroesophageal reflux disease) 08/16/2019    Coronary artery disease involving native coronary artery of native heart without angina pectoris 07/11/2019    Pure hypercholesterolemia 07/11/2019    Low HDL (under 40) 07/11/2019    Primary hypertension 07/11/2019       Past Surgical History:  Past Surgical History:   Procedure Laterality Date    GA UPPER GI ENDOSCOPY,DIAGNOSIS  2/22/2021    Procedure: GASTROSCOPY;  Surgeon: Jcarlos Thomas M.D.;  Location: SURGERY [facility];  Service: EUS    EGD W/ENDOSCOPIC ULTRASOUND  2/22/2021    Procedure: EGD, WITH ENDOSCOPIC US - UPPER RADIAL LINEAR;  Surgeon: Jcarlos Thomas M.D.;  Location: SURGERY [facility];  Service: EUS    VASECTOMY  2007    FUSION, SPINE, LUMBAR, PLIF  2004    L4-S1       Allergies:  Iodine, Lisinopril, Pineapple, Shellfish allergy, Biofreeze, Cat hair extract, Latex, Mushroom  extract complex, and Atorvastatin    Social History:  Social History     Socioeconomic History    Marital status:      Spouse name: Not on file    Number of children: Not on file    Years of education: Not on file    Highest education level: Associate degree: occupational, technical, or vocational program   Occupational History    Not on file   Tobacco Use    Smoking status: Never    Smokeless tobacco: Never   Vaping Use    Vaping status: Never Used   Substance and Sexual Activity    Alcohol use: Not Currently     Comment: occasional, rare    Drug use: No    Sexual activity: Yes     Partners: Female     Birth control/protection: Surgical   Other Topics Concern    Not on file   Social History Narrative    Not on file     Social Drivers of Health     Financial Resource Strain: Low Risk  (1/25/2021)    Overall Financial Resource Strain (CARDIA)     Difficulty of Paying Living Expenses: Not hard at all   Food Insecurity: No Food Insecurity (1/25/2021)    Hunger Vital Sign     Worried About Running Out of Food in the Last Year: Never true     Ran Out of Food in the Last Year: Never true   Transportation Needs: No Transportation Needs (1/25/2021)    PRAPARE - Transportation     Lack of Transportation (Medical): No     Lack of Transportation (Non-Medical): No   Physical Activity: Sufficiently Active (1/25/2021)    Exercise Vital Sign     Days of Exercise per Week: 4 days     Minutes of Exercise per Session: 120 min   Stress: Stress Concern Present (1/25/2021)    [employer] [place] [employer] - Occupational Stress Questionnaire     Feeling of Stress : To some extent   Social Connections: Moderately Integrated (1/25/2021)    Social Connection and Isolation Panel [NHANES]     Frequency of Communication with Friends and Family: More than three times a week     Frequency of Social Gatherings with Friends and Family: Once a week     Attends Gnosticist Services: More than 4 times per year     Active Member of  Clubs or Organizations: Yes     Attends Club or Organization Meetings: More than 4 times per year     Marital Status:    Intimate Partner Violence: Not on file   Housing Stability: Unknown (1/25/2021)    Housing Stability Vital Sign     Unable to Pay for Housing in the Last Year: No     Number of Places Lived in the Last Year: Not on file     Unstable Housing in the Last Year: No       Family History:  Family History   Problem Relation Age of Onset    Diabetes Mother     No Known Problems Father     Diabetes Sister         T2    Accidental Death Brother        Medications:    Current Outpatient Medications:     Tirzepatide (MOUNJARO) 5 MG/0.5ML Solution Auto-injector, Inject 5 mg under the skin every 7 days., Disp: 2 mL, Rfl: 1    telmisartan (MICARDIS) 80 MG Tab, TAKE 1 TABLET BY MOUTH ONCE DAILY AT BEDTIME TO  LOWER  BLOOD  PRESSURE., Disp: 100 Tablet, Rfl: 3    sucralfate (CARAFATE) 1 GM Tab, Take 1 tablet by mouth twice daily, Disp: 60 Tablet, Rfl: 0    testosterone cypionate (DEPO-TESTOSTERONE) 200 MG/ML injection, INJECT 1 ML INTRAMUSCULARLY INTO THE SHOULDER, THIGH OR BUTTOCKS ONCE EVERY 2 WEEKS.  For 90 days, Disp: 6 mL, Rfl: 0    calcium carbonate (ALURENCE-SELTZER HEARTBURN) 750 MG chewable tablet, Chew 3,000-3,750 mg every 2 hours as needed (heartburn)., Disp: , Rfl:     Multiple Vitamins-Minerals (MENS ONE DAILY PO), Take 1 Tablet by mouth every morning., Disp: , Rfl:     ondansetron (ZOFRAN ODT) 4 MG TABLET DISPERSIBLE, Take 1 Tablet by mouth every 8 hours as needed for Nausea/Vomiting., Disp: 8 Tablet, Rfl: 0    lansoprazole (PREVACID) 30 MG CAPSULE DELAYED RELEASE, Take 1 Capsule by mouth every day., Disp: 90 Capsule, Rfl: 2    Evolocumab (REPATHA SURECLICK) 140 MG/ML Solution Auto-injector SubQ injection pen, Inject 1 mL under the skin every 14 days., Disp: 2.1 Each, Rfl: 12    levothyroxine (SYNTHROID) 112 MCG Tab, Take 1 Tablet by mouth every morning on an empty stomach. (Patient taking  "differently: Take 100 mcg by mouth every morning on an empty stomach.), Disp: 90 Tablet, Rfl: 3    nitroglycerin (NITROSTAT) 0.4 MG SL Tab, Place 1 Tablet under the tongue as needed for Chest Pain., Disp: 25 Tablet, Rfl: 11    ezetimibe (ZETIA) 10 MG Tab, Take 10 mg by mouth every evening., Disp: , Rfl:     Syringe/Needle, Disp, (SYRINGE 3CC/23GX1\") 23G X 1\" 3 ML Misc, Use with testosterone every 2 weeks, Disp: 6 Each, Rfl: 3    glucose blood strip, 1 Strip by Other route every day., Disp: 100 Strip, Rfl: 3    aspirin EC (ECOTRIN) 81 MG Tablet Delayed Response, Take 162 mg by mouth every morning. 2 tablets = 162 mg., Disp: , Rfl:     fexofenadine (ALLEGRA) 180 MG tablet, Take 180 mg by mouth every day., Disp: , Rfl:     Labs: Reviewed    Physical Examination:  Vital signs: There were no vitals taken for this visit. There is no height or weight on file to calculate BMI.  General: No apparent distress, cooperative  Eyes: No scleral icterus or discharge  ENMT: Normal on external inspection of nose, lips, normal thyroid exam  Neck: No abnormal masses on inspection  Resp: Normal effort, clear to auscultation bilaterally   CVS: Regular rate and rhythm, S1 S2 normal, no murmur   Extremities: No edema  Abdomen: abdominal obesity present  Neuro: Alert and oriented  Skin: No rash  Psych: Normal mood and affect, intact memory and able to make informed decisions    Assessment and Plan:    Basic physiology of DMII was explained to patient as well as microvascular/macrovascular complications. The importance of increasing physical activity to improve diabetes control was discussed with the patient. Patient was also educated on changing diet and making better choices to help control blood sugar.    Patient tolerating current dosing of Mounjaro  Testing FBG throughout they day, most seem to be at goal at this time.  A1c done via lab last week, has improved to goal at 6.6% (previously 7.6% 10/2024).    Nutrition habits largely " unchanged from last visit.  Has altered the timing of first meal of day, but continues to keep good portion control  Higher protein intake and minimizing carbs as best as possible.  Noted appetite suppression and early satiety with most meals since starting Mounjaro.  Has started back with consistent gym work, comments that he will be starting his usual 90 strength training schedule.    Will continue with current Mounjaro dosing for now, let patient know that if he is comfortable with dose increase to let clinic know.  Stressed importance of continued focus on nutrition habits, optimize protein intake, minimize carbs.  Continue to increase exercise.    Follow Up:  Three months for A1c    Thank you for allowing me to participate in the care of this patient.    Weston Manriquez, PharmD, BCACP  01/27/25    CC:   Manjeet Moyer III, M.D.

## 2025-03-31 NOTE — PROGRESS NOTES
Subjective:     CC: Here to discuss recent lab results.  Companied by his wife.    HPI:   Giuseppe presents today with the following medical concerns:    Type 2 diabetes mellitus with diabetic microalbuminuria, without long-term current use of insulin (HCC)  This is a chronic problem.  Patient's diabetes is under improved control but he has a little more microalbumin in his urine than before.  I told him that we will continue to watch and monitor this.  He is doing everything we can to improve on this and we will recheck it again in 3 months.    Testicular hypofunction  This is a chronic problem.  He is on testosterone shots.  He had a question since he says Dr. Colón suggested that he change to the topical version insert been more studies on that.  Patient is wanting to stay on the injections as he states his skin sometimes is sensitive to topical lotions.  Will continue to monitor him for now.    Elevated hemoglobin (HCC)  This is now an ongoing problem.  His hemoglobin has been mildly elevated at 19 on the last couple checks.  Is most likely due to his testosterone treatment.  Will continue to monitor this.  I told if he goes higher we will have to stop the testosterone.    GERD (gastroesophageal reflux disease)  This is a chronic problem.  Patient states that he still been taking the sucralfate but he does not think it is helping.  He still gets periodic episodes at night where he wakes up with heartburn.  He is taking his Prevacid in the morning.  I told him to incline the head of his bed 4 to 6 inches and started on Tagamet at bedtime.  If that does not work we could always refer him to gastroenterology to talk about procedures that can be done to help with that.  He is read about some artificial valve that can be placed or he could see a gastrointestinal surgeon to talk about procedures that can be done.    Acquired hypothyroidism  This is a chronic problem.  His TSH is now within normal limits.  Patient's eye  "doctor has noted some mild proptosis and had a CT scan done that did not show anything too significant.  His eye doctor is concerned that he may need to be on an IV medication to help with this.  Will continue to follow.    Past Medical History:   Diagnosis Date    Bowel habit changes 02/2021    constipation and diarrhea    Glaucoma     L eye    Heart burn     High cholesterol     Hypertension     Myocardial infarct (HCC)     Snoring     Tuberculosis 2008    no treatment, \"not active\"       Social History     Tobacco Use    Smoking status: Never    Smokeless tobacco: Never   Vaping Use    Vaping status: Never Used   Substance Use Topics    Alcohol use: Not Currently     Comment: occasional, rare    Drug use: No       Current Outpatient Medications Ordered in Epic   Medication Sig Dispense Refill    Tirzepatide (MOUNJARO) 5 MG/0.5ML Solution Auto-injector Inject 5 mg under the skin every 7 days. 2 mL 1    telmisartan (MICARDIS) 80 MG Tab TAKE 1 TABLET BY MOUTH ONCE DAILY AT BEDTIME TO  LOWER  BLOOD  PRESSURE. 100 Tablet 3    sucralfate (CARAFATE) 1 GM Tab Take 1 tablet by mouth twice daily 60 Tablet 0    testosterone cypionate (DEPO-TESTOSTERONE) 200 MG/ML injection INJECT 1 ML INTRAMUSCULARLY INTO THE SHOULDER, THIGH OR BUTTOCKS ONCE EVERY 2 WEEKS.  For 90 days 6 mL 0    calcium carbonate (LAURENCE-SELTZER HEARTBURN) 750 MG chewable tablet Chew 3,000-3,750 mg every 2 hours as needed (heartburn).      Multiple Vitamins-Minerals (MENS ONE DAILY PO) Take 1 Tablet by mouth every morning.      lansoprazole (PREVACID) 30 MG CAPSULE DELAYED RELEASE Take 1 Capsule by mouth every day. 90 Capsule 2    Evolocumab (REPATHA SURECLICK) 140 MG/ML Solution Auto-injector SubQ injection pen Inject 1 mL under the skin every 14 days. 2.1 Each 12    levothyroxine (SYNTHROID) 112 MCG Tab Take 1 Tablet by mouth every morning on an empty stomach. (Patient taking differently: Take 100 mcg by mouth every morning on an empty stomach.) 90 Tablet 3 " "   nitroglycerin (NITROSTAT) 0.4 MG SL Tab Place 1 Tablet under the tongue as needed for Chest Pain. 25 Tablet 11    aspirin EC (ECOTRIN) 81 MG Tablet Delayed Response Take 162 mg by mouth every morning. 2 tablets = 162 mg.      fexofenadine (ALLEGRA) 180 MG tablet Take 180 mg by mouth every day.      ezetimibe (ZETIA) 10 MG Tab Take 10 mg by mouth every evening.      Syringe/Needle, Disp, (SYRINGE 3CC/23GX1\") 23G X 1\" 3 ML Misc Use with testosterone every 2 weeks 6 Each 3    glucose blood strip 1 Strip by Other route every day. 100 Strip 3     No current Epic-ordered facility-administered medications on file.       Allergies:  Iodine, Lisinopril, Pineapple, Shellfish allergy, Biofreeze, Cat hair extract, Latex, Mushroom extract complex, Rosuvastatin, and Atorvastatin    Health Maintenance: Completed    ROS:  Gen: no fevers/chills, no changes in weight  ENT: no sore throat, no hearing loss, no bloody nose  Pulm: no sob, no cough  CV: no chest pain, no palpitations  GI: no nausea/vomiting, no diarrhea  : no dysuria  MSk: no myalgias  Skin: no rash  Neuro: no headaches, no numbness/tingling  Heme/Lymph: no easy bruising      Objective:       Exam:  /74 (BP Location: Right arm, Patient Position: Sitting, BP Cuff Size: Adult)   Pulse 74   Temp 37.3 °C (99.2 °F) (Temporal)   Resp 18   Ht 1.791 m (5' 10.5\")   Wt 111 kg (244 lb 4.8 oz)   SpO2 96%   BMI 34.56 kg/m²  Body mass index is 34.56 kg/m².    Gen: Alert and oriented, No apparent distress.  Lungs: Normal effort,   Ext: No clubbing, cyanosis, edema.      Labs: Reviewed with patient    Assessment & Plan:     49 y.o. male with the following -     1. Acquired hypothyroidism  This is a chronic problem.  Under good control.  Will continue on current dose medical medication.    2. Testicular hypofunction  This is a chronic problem.  Recheck labs in 3 months.  If his hemoglobin remains elevated we will have to stop his testosterone.  For now patient wants to " remain on the injectable testosterone rather than the topical version.  - CBC WITH DIFFERENTIAL; Future  - TESTOSTERONE SERUM; Future    3. Gastroesophageal reflux disease, unspecified whether esophagitis present  This is a chronic problem.  Will have him start on Tagamet at bedtime.  If he has continued symptoms of acid reflux we can refer him to gastroenterology or a GI surgeon.    4. Type 2 diabetes mellitus with diabetic microalbuminuria, without long-term current use of insulin (HCC)  This is a chronic problem under good control.  Recheck his microalbumin again in 3 months.  - HEMOGLOBIN A1C; Future  - MICROALBUMIN CREAT RATIO URINE; Future    5. Elevated hemoglobin (HCC)  This is a chronic problem.  Continue to monitor.  - CBC WITH DIFFERENTIAL; Future      Return in about 3 months (around 6/30/2025) for Long.    Please note that this dictation was created using voice recognition software. I have made every reasonable attempt to correct obvious errors, but I expect that there are errors of grammar and possibly content that I did not discover before finalizing the note.

## 2025-03-31 NOTE — ASSESSMENT & PLAN NOTE
This is a chronic problem.  Patient's diabetes is under improved control but he has a little more microalbumin in his urine than before.  I told him that we will continue to watch and monitor this.  He is doing everything we can to improve on this and we will recheck it again in 3 months.

## 2025-03-31 NOTE — ASSESSMENT & PLAN NOTE
This is now an ongoing problem.  His hemoglobin has been mildly elevated at 19 on the last couple checks.  Is most likely due to his testosterone treatment.  Will continue to monitor this.  I told if he goes higher we will have to stop the testosterone.

## 2025-03-31 NOTE — ASSESSMENT & PLAN NOTE
This is a chronic problem.  Patient states that he still been taking the sucralfate but he does not think it is helping.  He still gets periodic episodes at night where he wakes up with heartburn.  He is taking his Prevacid in the morning.  I told him to incline the head of his bed 4 to 6 inches and started on Tagamet at bedtime.  If that does not work we could always refer him to gastroenterology to talk about procedures that can be done to help with that.  He is read about some artificial valve that can be placed or he could see a gastrointestinal surgeon to talk about procedures that can be done.

## 2025-03-31 NOTE — ASSESSMENT & PLAN NOTE
This is a chronic problem.  He is on testosterone shots.  He had a question since he says Dr. Colón suggested that he change to the topical version insert been more studies on that.  Patient is wanting to stay on the injections as he states his skin sometimes is sensitive to topical lotions.  Will continue to monitor him for now.

## 2025-04-04 DIAGNOSIS — R79.89 LOW TESTOSTERONE IN MALE: ICD-10-CM

## 2025-04-04 RX ORDER — TESTOSTERONE CYPIONATE 200 MG/ML
INJECTION, SOLUTION INTRAMUSCULAR
Qty: 6 ML | Refills: 0 | Status: SHIPPED | OUTPATIENT
Start: 2025-04-04 | End: 2025-07-03

## 2025-04-10 ENCOUNTER — HOSPITAL ENCOUNTER (OUTPATIENT)
Dept: LAB | Facility: MEDICAL CENTER | Age: 50
End: 2025-04-10
Attending: FAMILY MEDICINE
Payer: COMMERCIAL

## 2025-04-10 DIAGNOSIS — I25.10 CORONARY ARTERY DISEASE INVOLVING NATIVE CORONARY ARTERY OF NATIVE HEART WITHOUT ANGINA PECTORIS: ICD-10-CM

## 2025-04-10 DIAGNOSIS — T46.6X5A MYALGIA DUE TO STATIN: Chronic | ICD-10-CM

## 2025-04-10 DIAGNOSIS — M79.10 MYALGIA DUE TO STATIN: Chronic | ICD-10-CM

## 2025-04-10 DIAGNOSIS — R74.8 ELEVATED CPK: ICD-10-CM

## 2025-04-10 DIAGNOSIS — E78.00 PRIMARY HYPERCHOLESTEROLEMIA: ICD-10-CM

## 2025-04-10 DIAGNOSIS — I10 PRIMARY HYPERTENSION: ICD-10-CM

## 2025-04-10 PROCEDURE — 86141 C-REACTIVE PROTEIN HS: CPT

## 2025-04-10 PROCEDURE — 82550 ASSAY OF CK (CPK): CPT

## 2025-04-10 PROCEDURE — 80061 LIPID PANEL: CPT

## 2025-04-10 PROCEDURE — 82172 ASSAY OF APOLIPOPROTEIN: CPT

## 2025-04-10 PROCEDURE — 80053 COMPREHEN METABOLIC PANEL: CPT

## 2025-04-10 PROCEDURE — 82088 ASSAY OF ALDOSTERONE: CPT

## 2025-04-10 PROCEDURE — 84244 ASSAY OF RENIN: CPT

## 2025-04-10 PROCEDURE — 36415 COLL VENOUS BLD VENIPUNCTURE: CPT

## 2025-04-11 LAB
ALBUMIN SERPL BCP-MCNC: 4.4 G/DL (ref 3.2–4.9)
ALBUMIN/GLOB SERPL: 1.5 G/DL
ALP SERPL-CCNC: 61 U/L (ref 30–99)
ALT SERPL-CCNC: 25 U/L (ref 2–50)
ANION GAP SERPL CALC-SCNC: 10 MMOL/L (ref 7–16)
AST SERPL-CCNC: 28 U/L (ref 12–45)
BILIRUB SERPL-MCNC: 0.6 MG/DL (ref 0.1–1.5)
BUN SERPL-MCNC: 12 MG/DL (ref 8–22)
CALCIUM ALBUM COR SERPL-MCNC: 9.3 MG/DL (ref 8.5–10.5)
CALCIUM SERPL-MCNC: 9.6 MG/DL (ref 8.5–10.5)
CHLORIDE SERPL-SCNC: 102 MMOL/L (ref 96–112)
CHOLEST SERPL-MCNC: 121 MG/DL (ref 100–199)
CK SERPL-CCNC: 187 U/L (ref 0–154)
CO2 SERPL-SCNC: 27 MMOL/L (ref 20–33)
CREAT SERPL-MCNC: 1.28 MG/DL (ref 0.5–1.4)
CRP SERPL HS-MCNC: 2.1 MG/L (ref 0–3)
GFR SERPLBLD CREATININE-BSD FMLA CKD-EPI: 68 ML/MIN/1.73 M 2
GLOBULIN SER CALC-MCNC: 3 G/DL (ref 1.9–3.5)
GLUCOSE SERPL-MCNC: 106 MG/DL (ref 65–99)
HDLC SERPL-MCNC: 33 MG/DL
LDLC SERPL CALC-MCNC: 51 MG/DL
POTASSIUM SERPL-SCNC: 4.1 MMOL/L (ref 3.6–5.5)
PROT SERPL-MCNC: 7.4 G/DL (ref 6–8.2)
SODIUM SERPL-SCNC: 139 MMOL/L (ref 135–145)
TRIGL SERPL-MCNC: 183 MG/DL (ref 0–149)

## 2025-04-12 LAB
ALDOST SERPL-MCNC: 7.4 NG/DL
APO B100 SERPL-MCNC: 68 MG/DL (ref 66–133)

## 2025-04-14 LAB — RENIN PLAS-CCNC: 6.7 NG/ML/HR

## 2025-04-15 ENCOUNTER — OFFICE VISIT (OUTPATIENT)
Dept: VASCULAR LAB | Facility: MEDICAL CENTER | Age: 50
End: 2025-04-15
Attending: FAMILY MEDICINE
Payer: COMMERCIAL

## 2025-04-15 VITALS
HEIGHT: 71 IN | DIASTOLIC BLOOD PRESSURE: 85 MMHG | HEART RATE: 59 BPM | BODY MASS INDEX: 33.25 KG/M2 | SYSTOLIC BLOOD PRESSURE: 129 MMHG | WEIGHT: 237.5 LBS

## 2025-04-15 DIAGNOSIS — E11.29 TYPE 2 DIABETES MELLITUS WITH DIABETIC MICROALBUMINURIA, WITHOUT LONG-TERM CURRENT USE OF INSULIN (HCC): ICD-10-CM

## 2025-04-15 DIAGNOSIS — Z79.890 LONG-TERM CURRENT USE OF TESTOSTERONE REPLACEMENT THERAPY: ICD-10-CM

## 2025-04-15 DIAGNOSIS — Z95.5 STENTED CORONARY ARTERY: ICD-10-CM

## 2025-04-15 DIAGNOSIS — I25.2 HISTORY OF NON-ST ELEVATION MYOCARDIAL INFARCTION (NSTEMI): ICD-10-CM

## 2025-04-15 DIAGNOSIS — I10 PRIMARY HYPERTENSION: ICD-10-CM

## 2025-04-15 DIAGNOSIS — I25.10 CORONARY ARTERY DISEASE INVOLVING NATIVE CORONARY ARTERY OF NATIVE HEART WITHOUT ANGINA PECTORIS: ICD-10-CM

## 2025-04-15 DIAGNOSIS — Z78.9 STATIN INTOLERANCE: ICD-10-CM

## 2025-04-15 DIAGNOSIS — E78.49 FCHL (FAMILIAL COMBINED HYPERLIPIDEMIA): ICD-10-CM

## 2025-04-15 DIAGNOSIS — M79.10 MYALGIA DUE TO STATIN: ICD-10-CM

## 2025-04-15 DIAGNOSIS — T46.6X5A MYALGIA DUE TO STATIN: ICD-10-CM

## 2025-04-15 DIAGNOSIS — R80.9 TYPE 2 DIABETES MELLITUS WITH DIABETIC MICROALBUMINURIA, WITHOUT LONG-TERM CURRENT USE OF INSULIN (HCC): ICD-10-CM

## 2025-04-15 DIAGNOSIS — R74.8 ELEVATED CPK: ICD-10-CM

## 2025-04-15 DIAGNOSIS — K76.0 HEPATIC STEATOSIS: ICD-10-CM

## 2025-04-15 DIAGNOSIS — E66.811 CLASS 1 OBESITY WITH SERIOUS COMORBIDITY AND BODY MASS INDEX (BMI) OF 33.0 TO 33.9 IN ADULT, UNSPECIFIED OBESITY TYPE: ICD-10-CM

## 2025-04-15 PROCEDURE — 3074F SYST BP LT 130 MM HG: CPT | Performed by: FAMILY MEDICINE

## 2025-04-15 PROCEDURE — 99212 OFFICE O/P EST SF 10 MIN: CPT

## 2025-04-15 PROCEDURE — 3079F DIAST BP 80-89 MM HG: CPT | Performed by: FAMILY MEDICINE

## 2025-04-15 PROCEDURE — 99214 OFFICE O/P EST MOD 30 MIN: CPT | Performed by: FAMILY MEDICINE

## 2025-04-15 ASSESSMENT — ENCOUNTER SYMPTOMS
COUGH: 0
FEVER: 0
SHORTNESS OF BREATH: 0
BRUISES/BLEEDS EASILY: 0
WHEEZING: 0
PALPITATIONS: 0
CLAUDICATION: 0
SPUTUM PRODUCTION: 0
CHILLS: 0
PND: 0
HEMOPTYSIS: 0
BLOOD IN STOOL: 0
ORTHOPNEA: 0

## 2025-04-15 ASSESSMENT — FIBROSIS 4 INDEX: FIB4 SCORE: 1.18

## 2025-04-15 NOTE — PROGRESS NOTES
FOLLOW-UP FAMILY LIPID CLINIC   04/15/25     Giuseppe VERA Thony Swift, intially referred for evaluation/mgmt of dyslipidemia, est 3/2025  Referral Source: Jovanna Gee MD (cardiology)    Subjective      Interval hx/concerns: last seen 3/2025, had labs.  Tolerating med adjustments.   Mild high CPK - stable, LDL 51 down from 121  Mild high TGs   hsCRP 2.1, apoB 68  PAC 7.4, PRA 6.7  Had 1 episode of CP after work-out, took NTG and did not relieve pain.  Feels is musculoskel in nature due to recent restart of work-ou.  No other assoc sx     Pending ophthalmo and pending with endocrine doctor.   Has had adjustments to mounjaro, ongoing with pharm DM clinic.     HNP FH testing? Not completed      DYSLIPIDEMIA  CURRENT MED MGMT  Current Rx:   Statin: None  Non-Statin: repatha   Supplements: None  Side effects? no  Adherence? yes  LIFESTYLE MGMT  Barriers to care/SDOH: none  Tobacco:  reports that he has never smoked. He has never used smokeless tobacco.   Change in weight:  improved on Mounjaro   Exercise habits: sporadic irregular exercise  Dietary patterns: working towards Med style   Etoh: see sochx  PERTINENT HLD PMHX  Age at Initial Dx: 42 (but unsure if prior to that)    Initial visit history: hx of 2018 NSTEMI with GARRET placement, CAD. Had increase CPK on prior statins and stopped.  Has established with pharmacotherapy clinic and initiated Repatha with good tolerance.  Has stopped zetia currently.  No myalgias.    Has ongoing care for hypothyroidism, recent CT orbit with mild proptosis bilat  Started on TRT due to low testos per PCP.   Prior dyslipidemia genotyping? no  Baseline Lipids Prior to Treatment:   Lab Results   Component Value Date/Time    CHOLSTRLTOT 179 05/17/2024 09:33 AM     (H) 05/17/2024 09:33 AM    HDL 41 05/17/2024 09:33 AM    TRIGLYCERIDE 86 05/17/2024 09:33 AM       HX OF MAJOR ASCVD:     # hx of NSTEMI (2018), s/p GARRET placement - stable, no current sx, seeing cardiology    OTHER  "ESTABLISHED CVD: None  Secondary contributors/causes of dyslipidemia: MetS, T2D, insulin resistance    Previous lipid-lowering meds and outcome:  Statin: atorva, rosuva  Outcome: other myalgia, elevated CPK, improved with cessation   Non-Statin: none  Outcome: not applicable    OTHER CV RISK FACTORS:  HTN: Stable, tolerating meds with good adherence, Home BP los/70s  Dysglycemia: yes, T2D, seeing pharm DM clinic.  Pending with endocrine MD     ANTITHROMBOTIC TX: ASA 81mg - no hx of bleeding      Current Outpatient Medications on File Prior to Visit   Medication Sig Dispense Refill    testosterone cypionate (DEPO-TESTOSTERONE) 200 MG/ML injection INJECT 1 ML INTRAMUSCULARLY INTO THE SHOULDER, THIGH OR BUTTOCKS ONCE EVERY 2 WEEKS.  For 90 days 6 mL 0    Tirzepatide (MOUNJARO) 5 MG/0.5ML Solution Auto-injector Inject 5 mg under the skin every 7 days. 2 mL 1    telmisartan (MICARDIS) 80 MG Tab TAKE 1 TABLET BY MOUTH ONCE DAILY AT BEDTIME TO  LOWER  BLOOD  PRESSURE. 100 Tablet 3    calcium carbonate (LAURENCE-SELTZER HEARTBURN) 750 MG chewable tablet Chew 3,000-3,750 mg every 2 hours as needed (heartburn).      Multiple Vitamins-Minerals (MENS ONE DAILY PO) Take 1 Tablet by mouth every morning.      lansoprazole (PREVACID) 30 MG CAPSULE DELAYED RELEASE Take 1 Capsule by mouth every day. 90 Capsule 2    Evolocumab (REPATHA SURECLICK) 140 MG/ML Solution Auto-injector SubQ injection pen Inject 1 mL under the skin every 14 days. 2.1 Each 12    levothyroxine (SYNTHROID) 112 MCG Tab Take 1 Tablet by mouth every morning on an empty stomach. (Patient taking differently: Take 100 mcg by mouth every morning on an empty stomach.) 90 Tablet 3    nitroglycerin (NITROSTAT) 0.4 MG SL Tab Place 1 Tablet under the tongue as needed for Chest Pain. 25 Tablet 11    Syringe/Needle, Disp, (SYRINGE 3CC/23GX1\") 23G X 1\" 3 ML Misc Use with testosterone every 2 weeks 6 Each 3    glucose blood strip 1 Strip by Other route every day. 100 Strip 3 " "   aspirin EC (ECOTRIN) 81 MG Tablet Delayed Response Take 162 mg by mouth every morning. 2 tablets = 162 mg.      fexofenadine (ALLEGRA) 180 MG tablet Take 180 mg by mouth every day.      sucralfate (CARAFATE) 1 GM Tab Take 1 tablet by mouth twice daily 60 Tablet 0     No current facility-administered medications on file prior to visit.      Allergies   Allergen Reactions    Iodine Hives, Rash and Swelling     Topical - rash  IV contrast - full body hives and swelling    Lisinopril Cough    Pineapple Itching and Swelling     Tongue swelling and itchy throat    Shellfish Allergy Anaphylaxis    Biofreeze Rash     Rash and blisters    Cat Hair Extract Hives, Shortness of Breath, Rash, Runny Nose, Itching, Swelling and Anxiety          Latex Rash          Mushroom Extract Complex Swelling     Swelling and mouth numbness     Rosuvastatin      Myalgias, elevated CK    Atorvastatin Myalgia     Myaglias      Social History     Tobacco Use    Smoking status: Never    Smokeless tobacco: Never   Vaping Use    Vaping status: Never Used   Substance Use Topics    Alcohol use: Not Currently     Comment: occasional, rare    Drug use: No     Review of Systems   Constitutional:  Negative for chills, fever and malaise/fatigue.   HENT:  Negative for nosebleeds.    Respiratory:  Negative for cough, hemoptysis, sputum production, shortness of breath and wheezing.    Cardiovascular:  Negative for chest pain, palpitations, orthopnea, claudication, leg swelling and PND.   Gastrointestinal:  Negative for blood in stool and melena.   Genitourinary:  Negative for hematuria.   Endo/Heme/Allergies:  Does not bruise/bleed easily.         Objective    Vitals:    04/15/25 1312 04/15/25 1316   BP: (!) 143/84 129/85   BP Location: Left arm Left arm   Patient Position: Sitting Sitting   BP Cuff Size: Large adult long Large adult long   Pulse: 71 (!) 59   Weight: 108 kg (237 lb 8 oz)    Height: 1.791 m (5' 10.5\")      BP Readings from Last 5 " Encounters:   04/15/25 129/85   03/31/25 130/74   03/03/25 (!) 155/89   02/03/25 118/82   01/18/25 137/84     BMI Readings from Last 1 Encounters:   04/15/25 33.60 kg/m²     Wt Readings from Last 3 Encounters:   04/15/25 108 kg (237 lb 8 oz)   03/31/25 111 kg (244 lb 4.8 oz)   03/03/25 111 kg (245 lb)     Physical Exam  Constitutional:       General: He is not in acute distress.     Appearance: Normal appearance. He is not diaphoretic.   HENT:      Head: Normocephalic and atraumatic.   Eyes:      Conjunctiva/sclera: Conjunctivae normal.   Cardiovascular:      Rate and Rhythm: Normal rate and regular rhythm.      Pulses:           Carotid pulses are 2+ on the right side and 2+ on the left side.       Radial pulses are 2+ on the right side and 2+ on the left side.        Dorsalis pedis pulses are 2+ on the right side and 2+ on the left side.        Posterior tibial pulses are 2+ on the right side and 2+ on the left side.      Heart sounds: Normal heart sounds.   Pulmonary:      Effort: Pulmonary effort is normal.      Breath sounds: Normal breath sounds.   Musculoskeletal:      Right lower leg: No edema.      Left lower leg: No edema.   Skin:     General: Skin is warm and dry.   Neurological:      Mental Status: He is alert and oriented to person, place, and time.      Cranial Nerves: No cranial nerve deficit.      Gait: Gait is intact.   Psychiatric:         Mood and Affect: Mood and affect normal.       DATA REVIEW:  Most Recent Lipid Panel:   Lab Results   Component Value Date/Time    CHOLSTRLTOT 121 04/10/2025 02:11 PM    LDL 51 04/10/2025 02:11 PM    HDL 33 (A) 04/10/2025 02:11 PM    TRIGLYCERIDE 183 (H) 04/10/2025 02:11 PM     Lab Results   Component Value Date/Time    LDL 51 04/10/2025 02:11 PM     (H) 05/17/2024 09:33 AM    LDL 99 10/24/2023 09:18 AM    LDL 84 08/14/2020 09:35 AM    LDL 84 08/14/2020 09:29 AM     Lab Results   Component Value Date/Time    LIPOPROTA <6 02/03/2025 02:19 PM      Lab  Results   Component Value Date/Time    APOB 68 04/10/2025 02:11 PM      Lab Results   Component Value Date/Time    CRPHIGHSEN 2.1 04/10/2025 02:11 PM        Latest Reference Range & Units 11/05/24 12:01 11/26/24 12:12 12/26/24 14:57 02/03/25 14:19 04/10/25 14:11   CPK Total 0 - 154 U/L 424 (H) 282 (H) 186 (H) 179 (H) 187 (H)     Other Pertinent Blood Work:   Lab Results   Component Value Date    SODIUM 139 04/10/2025    POTASSIUM 4.1 04/10/2025    CHLORIDE 102 04/10/2025    CO2 27 04/10/2025    ANION 10.0 04/10/2025    GLUCOSE 106 (H) 04/10/2025    BUN 12 04/10/2025    CREATININE 1.28 04/10/2025    CALCIUM 9.6 04/10/2025    ASTSGOT 28 04/10/2025    ALTSGPT 25 04/10/2025    ALKPHOSPHAT 61 04/10/2025    TBILIRUBIN 0.6 04/10/2025    ALBUMIN 4.4 04/10/2025    AGRATIO 1.5 04/10/2025    TSHULTRASEN 2.960 03/26/2025    CPKTOTAL 187 (H) 04/10/2025     Lab Results   Component Value Date/Time    HBA1C 6.6 (H) 03/26/2025 10:51 AM    HBA1C 7.6 (H) 10/21/2024 12:00 AM    HBA1C 6.1 (H) 05/03/2024 09:12 AM      Lab Results   Component Value Date/Time    MALBCRT 404 (H) 03/26/2025 10:54 AM    MICROALBUR 54.5 03/26/2025 10:54 AM         Latest Reference Range & Units 07/16/24 09:23 10/21/24 10:14   Testosterone,Total 175 - 781 ng/dL 1288 (H) 598      Latest Reference Range & Units 10/21/24 00:00 12/26/24 15:02 03/26/25 10:51   TSH 0.380 - 5.330 uIU/mL 7.580 (H) 5.660 (H) 2.960   Free T-4 0.93 - 1.70 ng/dL  1.02        IMAGING:    Echo 10-3-18 MT  EF 60%, normal wall motion    MRA chest 10-15-19  MRA of the thoracic aorta within normal limits. No aneurysmal dilatation or evidence of dissection    2020 US abd   Hepatic steatosis without evidence of biliary dilatation or mass   Pancreas and aorta are obscured by bowel gas    9/2022 NM stress    NUCLEAR IMAGING INTERPRETATION   Normal Lexiscan myocardial perfusion study.   No evidence of ischemia or infarct.   SDS 0.   LVEF 51%   No ischemic changes with Regadenoson.   No arrhythmias.    No chest pain.   ECG INTERPRETATION   Negative stress ECG for ischemia.     1/2025 CT a/p with   Adrenal glands: Normal.   Kidneys: Unremarkable without hydronephrosis.  Vasculature: Unremarkable.  IMPRESSION:  No acute findings.    2/10/25 CT orbits   1. Mild proptosis bilaterally.  2. 1.4 cm left maxillary sinus mucosal retention cyst.  3. Nasal septum is deviated to the right.      PROCEDURES:     10/2018 [facility]  95% prox LAD, [facility] [place] GARRET, 4.5x18 mm, LVEDP 15 mmHg             ASSESSMENT AND PLAN  1. FCHL (familial combined hyperlipidemia)        2. Primary hypertension        3. Type 2 diabetes mellitus with diabetic microalbuminuria, without long-term current use of insulin (HCC)        4. Coronary artery disease involving native coronary artery of native heart without angina pectoris  APOLIPOPROTEIN B    Comp Metabolic Panel    Lipid Profile    CRP HIGH SENSITIVE (CARDIAC)      5. History of non-ST elevation myocardial infarction (NSTEMI)        6. Stented coronary artery        7. Hepatic steatosis        8. Elevated CPK        9. Statin intolerance        10. Myalgia due to statin        11. Long-term current use of testosterone replacement therapy        12. Class 1 obesity with serious comorbidity and body mass index (BMI) of 33.0 to 33.9 in adult, unspecified obesity type          PATIENT TYPE: Secondary Prevention    MAJOR ASCVD:     1) NSTEMI (10/2018) in [facility] 95% prox LAD that was treated with a 4.5 x 18 mm drug-eluting stent - stable, no sx   Plan  - continue secondary prevention treatment goals, intensive med mgmt and lifestyle interventions   - high CRP >2.0, is [employer] candidate, will repeat hsCRP next visit and determine for initiation of therapy, should check >24h after work-outs   - ongoing care with cardiology     OTHER ESTABLISHED CVD: None    EVIDENCE OF GENETIC DYSLIPIDEMIA: Yes   - on prior rosuva + zetia had LDL-C = 121, based upon presumed 70% reduction his calculated baseline LDL-C  is around 205 in the heFH range   - premature NSTEMI age 42    FH / LIPIDEMIA genotyping: YES, gave HNP number to get free FH genotyping   - consider invitae    APOE genotyping (for familial dysbetalipoproteinemia eval): NO    SECONDARY CAUSES / CONTRIBUTORS: yes  Metabolic:  BMI >30, T2D, IR, MetS  Thyroid disease: TSH still elevated with ongoing titration perPCP   Liver disease: none reported   Renal disease/nephrotic syndrome:  none reported  Dietary-induced (ketogenic, lean mass hyper-responder)? no  Medications: None  Alcohol use: no    ACC/AHA INDICATION FOR STATIN THERAPY:  Secondary Prevention - Very high risk ASCVD - 2 major events or 1 event with other high-risk conditions and Primary Severe HLD / FH (baseline LDL-C >190)    OTHER SIGNIFICANT RISK MARKERS:  High-risk conditions: Diabetes  and Hypertension   Lipoprotein(a): low risk <30 mg/dl    TARGETS / GOALS (per most recent ACC/AHA guidelines):  LDL-C <55 mg/dl  apoB <60 mg/dl  At goals? Approaching, apoB = 68 as of 4/2025    LIFESTYLE INTERVENTIONS  TOBACCO:  reports that he has never smoked. He has never used smokeless tobacco.   - continued complete avoidance of all tobacco products     PHYSICAL ACTIVITY: at least 150 min per week of moderate intensity    NUTRITION: Mediterranean, reduce Na to <1,500mg/day, and Triglyceride-lowering diet with reduced fat calories, reduced simple carbohydrates, reduce/avoid alcohol     ETOH: to limit to occasional social use    WT MGMT: focus on 5-7% reduction as initial goal  (1kg loss reduces SBP by 1 mmHg)  - consider use of various wt reducing interventions and average wt loss potential:   Baseline diet/exercise (5-7+%) + tirzepatide (15-20%))     LIPID-LOWERING MEDICATION MANAGEMENT:     Statin Therapy: COMPLETE INTOLERANCE   Would not rechallenge at this time due to CPK elevations with myalgias on rosuva and atorvastatin     Non-Statin Therapies:     LDL/apoB therapies:  EZETIMIBE: holding for now, may restart  "  BEMPEDOIC ACID: not currently indicated   BAS: not currently indicated   PCSK9 mAb: continue repatha 140mg Q2 weeks - reviewed use, ADRs and expected LDL reduction of 60+% to achieve LDL goals   INCLISIRAN: not indicated     TG therapies:   Avoid icosapent in light of anaphylaxis to shellfish/fish in the past, reviewed of prescribing info indicates \"It is not known whether patients with allergies to fish and/or shellfish are at an increased risk of an allergic reaction to VASCEPA. Patients with such allergies should discontinue VASCEPA if any reactions occur\"  - we have opted to avoid use     LP(a) modifying therapy: n/a     PCSK9i strategy indications:   PSCK9i indicated per 2018 ACC/AHA guidelines in this patient with established ASCVD whose LDL-C remains above threshold of 70 mg/dl despite maximally tolerated statin therapy.  PSCK9i indicated per 2018 ACC/AHA guidelines in this patient with clinical diagnosis of familial hypercholesterolemia (FH) phenotype whose LDL-C remains above threshold of 100 mg/dl despite maximally tolerated statin therapy.     RECOMMENDED SUPPLEMENTS: None     APHERESIS: n/a     BLOOD PRESSURE CONTROL   Office BP goal per ACC/AHA <130/80  Home BP at goal:  no  Office BP at goal:  no  24h ABPM:  not ordered to date   RDN candidate? YES  Contributing factors: BMI, T2D   Plan:   - start/continue home BP monitoring, reviewed correct technique, provide BP log and instructions  - order 24h ABPM:  NO  - routine monitoring of lytes/gfr   Medications:  - stop losartan, start telmisartan 80mg daily - best for MetS    GLYCEMIC STATUS/MGMT: Diabetic, T2 with CAD  Goal A1c < 7.0  Lab Results   Component Value Date/Time    HBA1C 6.6 (H) 03/26/2025 10:51 AM    HBA1C 7.6 (H) 10/21/2024 12:00 AM    HBA1C 6.1 (H) 05/03/2024 09:12 AM       Lab Results   Component Value Date/Time    MALBCRT 404 (H) 03/26/2025 10:54 AM    MICROALBUR 54.5 03/26/2025 10:54 AM     Plan:  - continue current medication plan, " defer mgmt to pharm DM clinic or endocrine   - recommmend for routine care with PCP (or endocrine) to include regular A1c monitoring, annual albumin/creatinine ratio (ACR), annual diabetic retinopathy screening, foot exams, annual flu vaccine, and updates to pneumonia vaccines as appropriate      ANTITHROMBOTIC THERAPY: Yes , continue ASA 81mg daily     OTHER    # hypothyroidism, stable - continue current treatment plan, defer mgmt to PCP/endocrine      # testicular hypofunction on TRT   - reviewed latest CV safety from TRAVERSE trial indicating likely CV safety with use of topical testosterone gel 1.62% if maintain total T between 350-750 ng/dl, acknowledging there was a 61% treatment discontinuation rate in both treatment and placebo groups and 21% trial withdrawal rate that could potentially bias study results/conclusions. Additionally, advised pt these findings cannot be extrapolated to men using other testosterone formulations or whom exceed >750 ng/dl.  Statistically significantly higher risk for AF, PE, and MARIA DE JESUS remained in treatment group compared to placebo group  Plan  - defer mgmt and surveillance to prescriber, and highly encouraged to review the above data and trial with prescriber for continued informed shared decision-making on benefits/risks for TRT      # elevated CPK - mild only, asymptomatic  - improved off statins but persistent and presumed d/t weight-lifting regimen  - no further w/u needed     STUDIES:  none   LABS: as noted above  FOLLOW-UP: 6 months     Raz Colón M.D.  JORGE, board-certified Clinical Lipidologist   [facility]   [facility]   [phone]      CC:  ZENIA Reid III, Laura E J, M.*

## 2025-05-01 NOTE — PROGRESS NOTES
Medical Decision Making:  Today's Assessment / Status / Plan:   -Aggressive premature coronary disease, aggressive risk reduction endeavors.  Tolerating aspirin.  Beta-blocker is no longer needed for CAD with normal EF.  -Significant statin intolerance with elevated CK.  Most recent elevated CK of 186 was on statin but when he was not exercising or recently working at [employer].  -Repeating another CK without exercise, without working and off of statin.  He will then resume a more modified workout.  I have asked him to go with lighter weights and multiple repetitions and cardiovascular.  Will repeat the CK again in about 2 months to see how it elevates with exercise.  -Currently on Repatha and ezetimibe.  If we can, retrial once weekly pravastatin 10 mg.  -Also working on diabetes control.  Tirzepatide has been approved.  Had some setbacks with GERD.  -I have referred him to vascular medicine to help with lipids and diabetes management.  If he chooses to see vascular medicine, he would not need to see clinical pharmacy or vice versa.  -Follow-up LDL once we get his medications more stabilized.  -Checking blood pressure at home, typically looks better at home  -Testosterone does been appropriately replaced is safe for heart patients.  -His resting chest pressure is atypical, has nitroglycerin.  -Working on thyroid, referred to an endocrinologist.  Might need to see a neuro-ophthalmologist.  -RTC with me in 3 months, [facility].      Assessment:     No diagnosis found.      Chief Complaint:   No chief complaint on file.    Giuseppe Bhandari is a 49 y.o. male who returns for long-term management of complex care including premature CAD/MI/stent, HLP, DM2, HTN, iodine allergy, statin intolerance, elevated CK.    He was working out in the gym, had been having chest discomfort he thought was related to lifting weights, would get tired, lightheaded, fatigued, nauseous.   The month before his  MI, he thought he had the flu. Was visiting here from [place] at the time.  Was planning to drive home from the gym but somehow drove to the ED, he does not really recall making the decision to go to the ED, vaguely recalls being in the ED, they reported he was sweating heavily.  Had NSTEMI October 3, 2018 in [place], [facility] 95% prox LAD that was treated with a 4.5 x 18 mm drug-eluting stent.    There is minimal disease elsewhere.    Echocardiogram demonstrated a normal left ventricular systolic function with an LVEF of 60. Sounds like he may have had a Nuc or CT scan.  Had hives.  Completed DAPT.  On BB, ASA, Repatha, Zetia.  Significant statin intolerance.    Has HLP, was on lipitor 80 mg, ldl 60, hdl low. LDL was initially 131.  Recalls being on pravastatin with no side effects.  Then placed on rosuvastatin 40 mg with ezetimibe but having myalgias.  , 282 on statin and he also works out intensely and also has to maneuver items up to 500 pounds working for [employer].    Repeat CPK was 186 with no workouts and will need had a few days off of work but tells me he was still on the statin.  Has been off the rosuvastatin and feeling much better.  Planning to follow-up on CKs.  We have gotten him on Repatha in addition to ezetimibe.    DM2. Not controlled recently.  Referred to clinical pharmacy and working on tirzepatide.    Has HTN, controlled.   Reports cough on lisinopril, better on losartan.  Has an XL BP cuff now.  Does get mildly lightheaded at times upon standing, not limiting.    Prior covid infection.  His O2 level has dropped, cxray was ok.  Resolved, feels 70-75% back to baseline.  COVID again but recovered.  Recent norovirus requiring ED visit.    Some lower extremity edema, not new, consistent with some mild venous insufficiency.    Intermittent pains in the chest, does not remind him of prior heart disease.  Can feel like a pressure just left of the sternum.  Can last 5 to 10 minutes.  Has nitroglycerin as  needed.  He is not limited by chest pain, pressure or tightness with exercise or intense activity work.   Reports mild dyspnea on exertion with cardio exercise, has not changed.  No orthopnea.  No significant palpitations.  No  presyncope/syncope.   No symptoms of leg claudication.   No stroke/TIA like symptoms.    Fathers HX not known.  No family history of premature coronary artery disease on mother's side.  No prior smoking history.  No history of hypertension.  No history of autoimmune disease such as lupus or rheumatoid arthritis.  No chronic kidney disease.     to Griselda Galvan who is here today.  Prior divorse, 3 kids with her, much stress, kids in [place].   Used to work as MA at [employer], now at [employer], again, sometimes has to help maneuver things greater than 500 pounds.     DATA REVIEWED by me:  ECG   11-4-24 Sinus 57, NS T wave changes  9-24-21 Sinus, 52    ECG 10-2-18 [place]  Sinus, 98     Echo 10-3-18 [place]  EF 60%, normal wall motion    Stress test  9/16/2022 nuclear   Normal Lexiscan myocardial perfusion study.   No evidence of ischemia or infarct.   SDS 0.   LVEF 51%   No ischemic changes with Regadenoson.   No arrhythmias.   No chest pain.   ECG INTERPRETATION   Negative stress ECG for ischemia.    LHC 10-3-18 [place]  95% prox LAD, [facility] GARRET, 4.5x18 mm, LVEDP 15 mmHg    MRA chest 10-15-19  MRA of the thoracic aorta within normal limits. No aneurysmal dilatation or evidence of dissection.    Most recent labs:     Lab Results   Component Value Date/Time    HEMOGLOBIN 19.0 (H) 03/26/2025 10:51 AM    HEMATOCRIT 59.9 (H) 03/26/2025 10:51 AM    MCV 84.1 03/26/2025 10:51 AM    INR 0.95 11/12/2020 08:09 AM      Lab Results   Component Value Date/Time    SODIUM 139 04/10/2025 02:11 PM    POTASSIUM 4.1 04/10/2025 02:11 PM    CHLORIDE 102 04/10/2025 02:11 PM    CO2 27 04/10/2025 02:11 PM    GLUCOSE 106 (H) 04/10/2025 02:11 PM    BUN 12 04/10/2025 02:11 PM    CREATININE 1.28 04/10/2025 02:11 PM     "CREATININE 1.0 01/26/2006 01:23 PM      Lab Results   Component Value Date/Time    ASTSGOT 28 04/10/2025 02:11 PM    ALTSGPT 25 04/10/2025 02:11 PM    ALBUMIN 4.4 04/10/2025 02:11 PM      Lab Results   Component Value Date/Time    CHOLSTRLTOT 121 04/10/2025 02:11 PM    LDL 51 04/10/2025 02:11 PM    HDL 33 (A) 04/10/2025 02:11 PM    TRIGLYCERIDE 183 (H) 04/10/2025 02:11 PM       9-7-19 h 15.4, p 217, ha 141, k 4.1, cr 0.98, lfts normal, a1c 6.1. ldl 60, hdl 39, tg 116, tc 122    Past Medical History:   Diagnosis Date    Bowel habit changes 02/2021    constipation and diarrhea    Glaucoma     L eye    Heart burn     High cholesterol     Hypertension     Myocardial infarct (HCC)     Snoring     Tuberculosis 2008    no treatment, \"not active\"     Past Surgical History:   Procedure Laterality Date    KS UPPER GI ENDOSCOPY,DIAGNOSIS  2/22/2021    Procedure: GASTROSCOPY;  Surgeon: Jcarlos Thomas M.D.;  Location: SURGERY [facility];  Service: EUS    EGD W/ENDOSCOPIC ULTRASOUND  2/22/2021    Procedure: EGD, WITH ENDOSCOPIC US - UPPER RADIAL LINEAR;  Surgeon: Jcarlos Thomas M.D.;  Location: [facility];  Service: EUS    VASECTOMY  2007    FUSION, SPINE, LUMBAR, PLIF  2004    L4-S1     Family History   Problem Relation Age of Onset    Diabetes Mother     No Known Problems Father     Diabetes Sister         T2    Accidental Death Brother      Social History     Socioeconomic History    Marital status:      Spouse name: Not on file    Number of children: Not on file    Years of education: Not on file    Highest education level: Associate degree: occupational, technical, or vocational program   Occupational History    Not on file   Tobacco Use    Smoking status: Never    Smokeless tobacco: Never   Vaping Use    Vaping status: Never Used   Substance and Sexual Activity    Alcohol use: Not Currently     Comment: occasional, rare    Drug use: No    Sexual activity: Yes     Partners: Female     Birth control/protection: " Surgical   Other Topics Concern    Not on file   Social History Narrative    Not on file     Social Drivers of Health     Financial Resource Strain: Low Risk  (1/25/2021)    Overall Financial Resource Strain (CARDIA)     Difficulty of Paying Living Expenses: Not hard at all   Food Insecurity: No Food Insecurity (1/25/2021)    Hunger Vital Sign     Worried About Running Out of Food in the Last Year: Never true     Ran Out of Food in the Last Year: Never true   Transportation Needs: No Transportation Needs (1/25/2021)    PRAPARE - Transportation     Lack of Transportation (Medical): No     Lack of Transportation (Non-Medical): No   Physical Activity: Sufficiently Active (1/25/2021)    Exercise Vital Sign     Days of Exercise per Week: 4 days     Minutes of Exercise per Session: 120 min   Stress: Stress Concern Present (1/25/2021)    [place] [employer] - Occupational Stress Questionnaire     Feeling of Stress : To some extent   Social Connections: Moderately Integrated (1/25/2021)    Social Connection and Isolation Panel [NHANES]     Frequency of Communication with Friends and Family: More than three times a week     Frequency of Social Gatherings with Friends and Family: Once a week     Attends Zoroastrian Services: More than 4 times per year     Active Member of Clubs or Organizations: Yes     Attends Club or Organization Meetings: More than 4 times per year     Marital Status:    Intimate Partner Violence: Not on file   Housing Stability: Unknown (1/25/2021)    Housing Stability Vital Sign     Unable to Pay for Housing in the Last Year: No     Number of Places Lived in the Last Year: Not on file     Unstable Housing in the Last Year: No     Allergies   Allergen Reactions    Iodine Hives, Rash and Swelling     Topical - rash  IV contrast - full body hives and swelling    Lisinopril Cough    Pineapple Itching and Swelling     Tongue swelling and itchy throat    Shellfish Allergy Anaphylaxis  "   Biofreeze Rash     Rash and blisters    Cat Hair Extract Hives, Shortness of Breath, Rash, Runny Nose, Itching, Swelling and Anxiety          Latex Rash          Mushroom Extract Complex Swelling     Swelling and mouth numbness     Rosuvastatin      Myalgias, elevated CK    Atorvastatin Myalgia     Myaglias       Current Outpatient Medications   Medication Sig Dispense Refill    testosterone cypionate (DEPO-TESTOSTERONE) 200 MG/ML injection INJECT 1 ML INTRAMUSCULARLY INTO THE SHOULDER, THIGH OR BUTTOCKS ONCE EVERY 2 WEEKS.  For 90 days 6 mL 0    Tirzepatide (MOUNJARO) 5 MG/0.5ML Solution Auto-injector Inject 5 mg under the skin every 7 days. 2 mL 1    telmisartan (MICARDIS) 80 MG Tab TAKE 1 TABLET BY MOUTH ONCE DAILY AT BEDTIME TO  LOWER  BLOOD  PRESSURE. 100 Tablet 3    sucralfate (CARAFATE) 1 GM Tab Take 1 tablet by mouth twice daily 60 Tablet 0    calcium carbonate (LAURENCE-SELTZER HEARTBURN) 750 MG chewable tablet Chew 3,000-3,750 mg every 2 hours as needed (heartburn).      Multiple Vitamins-Minerals (MENS ONE DAILY PO) Take 1 Tablet by mouth every morning.      lansoprazole (PREVACID) 30 MG CAPSULE DELAYED RELEASE Take 1 Capsule by mouth every day. 90 Capsule 2    Evolocumab (REPATHA SURECLICK) 140 MG/ML Solution Auto-injector SubQ injection pen Inject 1 mL under the skin every 14 days. 2.1 Each 12    levothyroxine (SYNTHROID) 112 MCG Tab Take 1 Tablet by mouth every morning on an empty stomach. (Patient taking differently: Take 100 mcg by mouth every morning on an empty stomach.) 90 Tablet 3    nitroglycerin (NITROSTAT) 0.4 MG SL Tab Place 1 Tablet under the tongue as needed for Chest Pain. 25 Tablet 11    Syringe/Needle, Disp, (SYRINGE 3CC/23GX1\") 23G X 1\" 3 ML Misc Use with testosterone every 2 weeks 6 Each 3    glucose blood strip 1 Strip by Other route every day. 100 Strip 3    aspirin EC (ECOTRIN) 81 MG Tablet Delayed Response Take 162 mg by mouth every morning. 2 tablets = 162 mg.      fexofenadine " (ALLEGRA) 180 MG tablet Take 180 mg by mouth every day.       No current facility-administered medications for this visit.     ROS  All others systems reviewed and negative.     Objective:     There were no vitals taken for this visit. There is no height or weight on file to calculate BMI.    Physical Exam   General: No acute distress. Well nourished.  HEENT: EOM grossly intact, no scleral icterus, no pharyngeal erythema.   Neck:  No JVD, no bruits, trachea midline  CVS: RRR. Normal S1, S2. No M/R/G. No LE edema.  2+ radial pulses, 2+ PT pulses  Resp: CTAB. No wheezing or crackles/rhonchi. Normal respiratory effort.  Abdomen: Soft, NT, no solo hepatomegaly.  MSK/Ext: No clubbing or cyanosis.  Skin: Warm and dry, no rashes.  Neurological: CN III-XII grossly intact. No focal deficits.   Psych: A&O x 3, appropriate affect, good judgement    Physical Exam listed below was completed in entrety today and unchanged from 2/3/2025 except where noted.  Some elements were copied from my note of that same day, which have been updated where appropriate and all reflect current meedical decision making from today.  I have confirmed and edited as necessary, the PFSH and ROS obtained by others.    Written instructions given today:    -Dr. Raz Colón is highly specialized in managing cholesterol and diabetes, particularly in people with side effects to certain medications.  He can perform the same duties as a clinical pharmacist.  I do not think you need both of them so I would stay with Dr. Raz Colón.  You can cancel your appointment with [facility] and the clinical pharmacy team after you established with Raz Colón unless Dr. Colón says otherwise.    -You will always have me as your cardiologist, specifically to watch for signs and symptoms that you are blocking up in the heart arteries.    -I will send you a message when I hear back as to where exactly Dr. Colón is located.    -CK blood test today or tomorrow and  again in 1 to 2 months after you start exercising.    -Mounjaro can make GERD worse.  If it becomes intolerable, you do not have to take it.    -Please try to keep your weights under 50 pounds and avoid prolonged straining.    -Please work on cardiovascular exercise, 20 to 30 minutes of something sustained such as walking or pedaling on a bicycle or an elliptical.    No follow-ups on file.    It is my pleasure to participate in the care of Mr. Bhandari.  Please do not hesitate to contact me with questions or concerns.    Jovanna Gee MD, [facility]  Cardiologist [facility]    Please note that this dictation was created using voice recognition software. I have made every reasonable attempt to correct obvious errors, but it is possible there are errors of grammar and possibly content that I did not discover before finalizing the note.

## 2025-05-05 ENCOUNTER — PATIENT MESSAGE (OUTPATIENT)
Dept: CARDIOLOGY | Facility: MEDICAL CENTER | Age: 50
End: 2025-05-05
Payer: COMMERCIAL

## 2025-05-05 DIAGNOSIS — K21.9 GASTROESOPHAGEAL REFLUX DISEASE, UNSPECIFIED WHETHER ESOPHAGITIS PRESENT: ICD-10-CM

## 2025-05-06 ENCOUNTER — APPOINTMENT (OUTPATIENT)
Dept: CARDIOLOGY | Facility: MEDICAL CENTER | Age: 50
End: 2025-05-06
Attending: INTERNAL MEDICINE
Payer: COMMERCIAL

## 2025-05-09 NOTE — Clinical Note
REFERRAL APPROVAL NOTICE         Sent on May 9, 2025                   Giuseppe Swift  [address]  [place] [address]                   Dear Mr. Thony Swift,    After a careful review of the medical information and benefit coverage, [facility] has processed your referral. See below for additional details.    If applicable, you must be actively enrolled with your insurance for coverage of the authorized service. If you have any questions regarding your coverage, please contact your insurance directly.    REFERRAL INFORMATION   Referral #:  [id number]  Referred-To Provider    Referred-By Provider:  Gastroenterology    Manjeet Moyer III, M.D.   [facility]      [address]  [place] [id number] [phone] [address] [facility] [address]  [phone]    Referral Start Date:  05/05/2025  Referral End Date:   05/05/2026             SCHEDULING  If you do not already have an appointment, please call [phone] to make an appointment.     MORE INFORMATION  If you do not already have a [employer] account, sign up at: [email or web address]  You can access your medical information, make appointments, see lab results, billing information, and more.  If you have questions regarding this referral, please contact  the [facility] Referrals department at:             [phone]. Monday - Friday 8:00AM - 5:00PM.     Sincerely,    [facility]    
General

## 2025-05-13 DIAGNOSIS — R80.9 TYPE 2 DIABETES MELLITUS WITH DIABETIC MICROALBUMINURIA, WITHOUT LONG-TERM CURRENT USE OF INSULIN (HCC): ICD-10-CM

## 2025-05-13 DIAGNOSIS — E11.29 TYPE 2 DIABETES MELLITUS WITH DIABETIC MICROALBUMINURIA, WITHOUT LONG-TERM CURRENT USE OF INSULIN (HCC): ICD-10-CM

## 2025-05-14 RX ORDER — TIRZEPATIDE 5 MG/.5ML
0.5 INJECTION, SOLUTION SUBCUTANEOUS
Qty: 2 ML | Refills: 5 | Status: SHIPPED | OUTPATIENT
Start: 2025-05-14 | End: 2025-05-15 | Stop reason: SDUPTHER

## 2025-05-15 DIAGNOSIS — E11.29 TYPE 2 DIABETES MELLITUS WITH DIABETIC MICROALBUMINURIA, WITHOUT LONG-TERM CURRENT USE OF INSULIN (HCC): ICD-10-CM

## 2025-05-15 DIAGNOSIS — R80.9 TYPE 2 DIABETES MELLITUS WITH DIABETIC MICROALBUMINURIA, WITHOUT LONG-TERM CURRENT USE OF INSULIN (HCC): ICD-10-CM

## 2025-05-15 RX ORDER — TIRZEPATIDE 5 MG/.5ML
0.5 INJECTION, SOLUTION SUBCUTANEOUS
Qty: 2 ML | Refills: 5 | Status: SHIPPED | OUTPATIENT
Start: 2025-05-15

## 2025-05-31 NOTE — PROGRESS NOTES
Medical decision making:        Problem list:  There are no diagnoses linked to this encounter.       Chief Complaint: No chief complaint on file.      History of Present Illness:  Giuseppe Swift is a 49 y.o. male who returns for long-term management of premature CAD/MI/stent, HLP, now DM2, HTN, iodine allergy, statin intolerance     He was working out in the gym, had been having chest discomfort he thought was related to lifting weights, would get tired, lightheaded, fatigued, nauseous.   The month before his MI, he thought he had the flu. Was visiting here from [place].   His cardio was being limiting by sign shortness of breath. Was at the gym, struggling, thought he was going to dry home but somehow drove to the ED, he does not really recall making the decision to go to the ED, vaguely recalls being in the ED, they reported he was sweating heavily.     Had NSTEMI October 3, 2018 in [place], [facility] 95% prox LAD that was treated with a 4.5 x 18 mm drug-eluting stent.    There is minimal disease elsewhere.    Echocardiogram demonstrated a normal left ventricular systolic function with an LVEF of 60. Sounds like he may have had a Nuc or CT scan.  Had hives.  Completed DAPT.  On statin, BB, ASA.     Has HLP, on lipitor 80 mg, ldl 60, hdl low. LDL was initially 131.  Recalls being on pravastatin with no side effects.  Currently on rosuvastatin 40 mg with ezetimibe.  Having joint pain, wondering about the statin.     DM2. Not controlled recently.      Has HTN, controlled.   Reports cough on lisinopril.  Has an XL BP cuff now.  Does get mildly lightheaded at times upon standing, not limiting.     Was having some lightheaded symptoms that correlated with sinusitis.     Prior covid infection.  His O2 level has dropped, cxray was ok.  Resolved, feels 70-75% back to baseline.  Recent COVID again.     Some lower extremity edema, not new, consistent with some mild venous insufficiency.     Intermittent pains in the chest,  does not remind him of prior heart disease.  Can feel like a pressure just left of the sternum.  Can last 5 to 10 minutes.  I asked him to try nitroglycerin.  He is not limited by chest pain, pressure or tightness with activity.   Reports mild dyspnea on exertion with cardio exercise, has not changed.  No orthopnea.  No significant palpitations.  No  presyncope/syncope.   No symptoms of leg claudication.   No stroke/TIA like symptoms.     Fathers HX not known.  No family history of premature coronary artery disease on mother's side.  No prior smoking history.  No history of hypertension.  No history of autoimmune disease such as lupus or rheumatoid arthritis.  No chronic kidney disease.      to Griselda Galvan who is here today.  Prior divorse, 3 kids with her, much stress, kids in [place].   Used to work as MA at [employer], now at [employer].     Wt Readings from Last 5 Encounters:   04/15/25 108 kg (237 lb 8 oz)   03/31/25 111 kg (244 lb 4.8 oz)   03/03/25 111 kg (245 lb)   02/03/25 113 kg (250 lb)   01/18/25 113 kg (249 lb 1.9 oz)       DATA REVIEWED by me:  ECG (my personal interpretation)  11-4-24 Sinus 57, NS T wave changes  9-24-21 Sinus, 52  10-2-18 [place] Sinus, 98     Heart Monitor  NA    Device check  NA    Echocardiogram  10-3-18 [place]  EF 60%, normal wall motion    Heart Catheterization  10-3-18 [place]  95% prox LAD, Resolute Angelo GARRET, 4.5x18 mm, LVEDP 15 mmHg    MRA chest   10-15-19  MRA of the thoracic aorta within normal limits. No aneurysmal dilatation or evidence of dissection.    Stress test  N/A    Most recent labs:     Lipoprotein (a)   Date Value Ref Range Status   02/03/2025 <6 <=29 mg/dL Final     Comment:     Performed By: [facility]  [address] [place] [id number]  : Paul Rolle MD, PhD  CLIA Number: [id number]         Lab Results   Component Value Date/Time    WBC 8.4 03/26/2025 10:51 AM    HEMOGLOBIN 19.0 (H) 03/26/2025 10:51 AM    HEMATOCRIT 59.9 (H)  "03/26/2025 10:51 AM    MCV 84.1 03/26/2025 10:51 AM    INR 0.95 11/12/2020 08:09 AM      Lab Results   Component Value Date/Time    SODIUM 139 04/10/2025 02:11 PM    POTASSIUM 4.1 04/10/2025 02:11 PM    CHLORIDE 102 04/10/2025 02:11 PM    CO2 27 04/10/2025 02:11 PM    GLUCOSE 106 (H) 04/10/2025 02:11 PM    BUN 12 04/10/2025 02:11 PM    CREATININE 1.28 04/10/2025 02:11 PM    CREATININE 1.0 01/26/2006 01:23 PM      Lab Results   Component Value Date/Time    ASTSGOT 28 04/10/2025 02:11 PM    ALTSGPT 25 04/10/2025 02:11 PM    ALBUMIN 4.4 04/10/2025 02:11 PM      Lab Results   Component Value Date/Time    CHOLSTRLTOT 121 04/10/2025 02:11 PM    LDL 51 04/10/2025 02:11 PM    HDL 33 (A) 04/10/2025 02:11 PM    TRIGLYCERIDE 183 (H) 04/10/2025 02:11 PM     No results for input(s): \"NTPROBNP\", \"TROPONINT\" in the last 72 hours.      Past Medical History[1]  Past Surgical History[2]  Family History   Problem Relation Age of Onset    Diabetes Mother     No Known Problems Father     Diabetes Sister         T2    Accidental Death Brother      Social History     Socioeconomic History    Marital status:      Spouse name: Not on file    Number of children: Not on file    Years of education: Not on file    Highest education level: Associate degree: occupational, technical, or vocational program   Occupational History    Not on file   Tobacco Use    Smoking status: Never    Smokeless tobacco: Never   Vaping Use    Vaping status: Never Used   Substance and Sexual Activity    Alcohol use: Not Currently     Comment: occasional, rare    Drug use: No    Sexual activity: Yes     Partners: Female     Birth control/protection: Surgical   Other Topics Concern    Not on file   Social History Narrative    Not on file     Social Drivers of Health     Financial Resource Strain: Low Risk  (1/25/2021)    Overall Financial Resource Strain (CARDIA)     Difficulty of Paying Living Expenses: Not hard at all   Food Insecurity: No Food Insecurity " (1/25/2021)    Hunger Vital Sign     Worried About Running Out of Food in the Last Year: Never true     Ran Out of Food in the Last Year: Never true   Transportation Needs: No Transportation Needs (1/25/2021)    PRAPARE - Transportation     Lack of Transportation (Medical): No     Lack of Transportation (Non-Medical): No   Physical Activity: Sufficiently Active (1/25/2021)    Exercise Vital Sign     Days of Exercise per Week: 4 days     Minutes of Exercise per Session: 120 min   Stress: Stress Concern Present (1/25/2021)    [place] [employer] - Occupational Stress Questionnaire     Feeling of Stress : To some extent   Social Connections: Moderately Integrated (1/25/2021)    Social Connection and Isolation Panel [NHANES]     Frequency of Communication with Friends and Family: More than three times a week     Frequency of Social Gatherings with Friends and Family: Once a week     Attends Muslim Services: More than 4 times per year     Active Member of Clubs or Organizations: Yes     Attends Club or Organization Meetings: More than 4 times per year     Marital Status:    Intimate Partner Violence: Not on file   Housing Stability: Unknown (1/25/2021)    Housing Stability Vital Sign     Unable to Pay for Housing in the Last Year: No     Number of Places Lived in the Last Year: Not on file     Unstable Housing in the Last Year: No     Allergies[3]    Current Medications[4]    ROS  All others systems reviewed and negative.    There were no vitals taken for this visit. There is no height or weight on file to calculate BMI.    General: No acute distress. Well nourished.  HEENT: EOM grossly intact, no scleral icterus, no pharyngeal erythema.   Neck:  No JVD, no bruits, trachea midline  CVS: RRR. Normal S1, S2. No M/R/G. No LE edema.  2+ radial pulses, 2+ PT pulses  Resp: CTAB. No wheezing or crackles/rhonchi. Normal respiratory effort.  Abdomen: Soft, NT, no solo hepatomegaly.  MSK/Ext: No  "clubbing or cyanosis.  Skin: Warm and dry, no rashes.  Neurological: CN III-XII grossly intact. No focal deficits.   Psych: A&O x 3, appropriate affect, good judgement    Physical Exam listed below was completed in entrety today and unchanged from 2-3-25 except where noted.  Some elements were copied from my note of that same day, which have been updated where appropriate and all reflect current meedical decision making from today.  I have confirmed and edited as necessary, the PFSH and ROS obtained by others.    No follow-ups on file.    Written instructions given today:    ***    It is my pleasure to participate in the care of Mr. Thony Swift.  Please do not hesitate to contact me with questions or concerns.    Jovanna Gee MD, [facility]  Cardiologist, [facility]    Please note that this dictation was created using voice recognition software. I have made every reasonable attempt to correct obvious errors, but it is possible there are errors of grammar and possibly content that I did not discover before finalizing the note.         [1]   Past Medical History:  Diagnosis Date    Bowel habit changes 02/2021    constipation and diarrhea    Glaucoma     L eye    Heart burn     High cholesterol     Hypertension     Myocardial infarct (HCC)     Snoring     Tuberculosis 2008    no treatment, \"not active\"   [2]   Past Surgical History:  Procedure Laterality Date    [place] UPPER GI ENDOSCOPY,DIAGNOSIS  2/22/2021    Procedure: GASTROSCOPY;  Surgeon: Jcarlos Thomas M.D.;  Location: [facility];  Service: EUS    EGD W/ENDOSCOPIC ULTRASOUND  2/22/2021    Procedure: EGD, WITH ENDOSCOPIC US - UPPER RADIAL LINEAR;  Surgeon: Jcarlos Thomas M.D.;  [facility];  Service: EUS    VASECTOMY  2007    FUSION, SPINE, LUMBAR, PLIF  2004    L4-S1   [3]   Allergies  Allergen Reactions    Iodine Hives, Rash and Swelling     Topical - rash  IV contrast - full body hives and swelling    " "Lisinopril Cough    Pineapple Itching and Swelling     Tongue swelling and itchy throat    Shellfish Allergy Anaphylaxis    Biofreeze Rash     Rash and blisters    Cat Hair Extract Hives, Shortness of Breath, Rash, Runny Nose, Itching, Swelling and Anxiety          Latex Rash          Mushroom Extract Complex Swelling     Swelling and mouth numbness     Rosuvastatin      Myalgias, elevated CK    Atorvastatin Myalgia     Myaglias   [4]   Current Outpatient Medications   Medication Sig Dispense Refill    Tirzepatide (MOUNJARO) 5 MG/0.5ML Solution Auto-injector Inject 0.5 mL under the skin every 7 days. 2 mL 5    testosterone cypionate (DEPO-TESTOSTERONE) 200 MG/ML injection INJECT 1 ML INTRAMUSCULARLY INTO THE SHOULDER, THIGH OR BUTTOCKS ONCE EVERY 2 WEEKS.  For 90 days 6 mL 0    telmisartan (MICARDIS) 80 MG Tab TAKE 1 TABLET BY MOUTH ONCE DAILY AT BEDTIME TO  LOWER  BLOOD  PRESSURE. 100 Tablet 3    calcium carbonate (LAURENCE-SELTZER HEARTBURN) 750 MG chewable tablet Chew 3,000-3,750 mg every 2 hours as needed (heartburn).      Multiple Vitamins-Minerals (MENS ONE DAILY PO) Take 1 Tablet by mouth every morning.      lansoprazole (PREVACID) 30 MG CAPSULE DELAYED RELEASE Take 1 Capsule by mouth every day. 90 Capsule 2    Evolocumab (REPATHA SURECLICK) 140 MG/ML Solution Auto-injector SubQ injection pen Inject 1 mL under the skin every 14 days. 2.1 Each 12    levothyroxine (SYNTHROID) 112 MCG Tab Take 1 Tablet by mouth every morning on an empty stomach. (Patient taking differently: Take 100 mcg by mouth every morning on an empty stomach.) 90 Tablet 3    nitroglycerin (NITROSTAT) 0.4 MG SL Tab Place 1 Tablet under the tongue as needed for Chest Pain. 25 Tablet 11    Syringe/Needle, Disp, (SYRINGE 3CC/23GX1\") 23G X 1\" 3 ML Misc Use with testosterone every 2 weeks 6 Each 3    glucose blood strip 1 Strip by Other route every day. 100 Strip 3    aspirin EC (ECOTRIN) 81 MG Tablet Delayed Response Take 162 mg by mouth every " morning. 2 tablets = 162 mg.      fexofenadine (ALLEGRA) 180 MG tablet Take 180 mg by mouth every day.       No current facility-administered medications for this visit.

## 2025-06-02 ENCOUNTER — APPOINTMENT (OUTPATIENT)
Dept: OPHTHALMOLOGY | Facility: MEDICAL CENTER | Age: 50
End: 2025-06-02
Payer: COMMERCIAL

## 2025-06-02 DIAGNOSIS — E03.9 ACQUIRED HYPOTHYROIDISM: ICD-10-CM

## 2025-06-02 DIAGNOSIS — E11.29 TYPE 2 DIABETES MELLITUS WITH DIABETIC MICROALBUMINURIA, WITHOUT LONG-TERM CURRENT USE OF INSULIN (HCC): ICD-10-CM

## 2025-06-02 DIAGNOSIS — H52.13 MYOPIA OF BOTH EYES: ICD-10-CM

## 2025-06-02 DIAGNOSIS — H05.89 THYROID ORBITOPATHY: Primary | ICD-10-CM

## 2025-06-02 DIAGNOSIS — R80.9 TYPE 2 DIABETES MELLITUS WITH DIABETIC MICROALBUMINURIA, WITHOUT LONG-TERM CURRENT USE OF INSULIN (HCC): ICD-10-CM

## 2025-06-02 DIAGNOSIS — E07.9 THYROID ORBITOPATHY: Primary | ICD-10-CM

## 2025-06-02 PROCEDURE — 99205 OFFICE O/P NEW HI 60 MIN: CPT | Mod: 25 | Performed by: STUDENT IN AN ORGANIZED HEALTH CARE EDUCATION/TRAINING PROGRAM

## 2025-06-02 PROCEDURE — 92250 FUNDUS PHOTOGRAPHY W/I&R: CPT | Performed by: STUDENT IN AN ORGANIZED HEALTH CARE EDUCATION/TRAINING PROGRAM

## 2025-06-02 PROCEDURE — 92060 SENSORIMOTOR EXAMINATION: CPT | Performed by: STUDENT IN AN ORGANIZED HEALTH CARE EDUCATION/TRAINING PROGRAM

## 2025-06-02 ASSESSMENT — TONOMETRY
OS_IOP_MMHG: 17
OD_IOP_MMHG: 17

## 2025-06-02 ASSESSMENT — ENCOUNTER SYMPTOMS
HEADACHES: 1
PHOTOPHOBIA: 1

## 2025-06-02 ASSESSMENT — REFRACTION_WEARINGRX
OS_AXIS: 129
OS_CYLINDER: +2.00
SPECS_TYPE: TRIFOCAL
OD_SPHERE: -4.00
OS_ADD: +1.75
OS_SPHERE: -4.25
OD_CYLINDER: +2.25
OD_ADD: +2.00
OD_AXIS: 088

## 2025-06-02 ASSESSMENT — CONF VISUAL FIELD
OD_NORMAL: 1
OS_INFERIOR_NASAL_RESTRICTION: 0
OS_INFERIOR_TEMPORAL_RESTRICTION: 0
OD_SUPERIOR_TEMPORAL_RESTRICTION: 0
OD_INFERIOR_NASAL_RESTRICTION: 0
OD_INFERIOR_TEMPORAL_RESTRICTION: 0
OS_NORMAL: 1
OD_SUPERIOR_NASAL_RESTRICTION: 0
OS_SUPERIOR_NASAL_RESTRICTION: 0
OS_SUPERIOR_TEMPORAL_RESTRICTION: 0

## 2025-06-02 ASSESSMENT — MARGIN REFLEX DISTANCE
OS_MRD1: 2
OS_MRD2: 4
OD_MRD2: 5
OD_MRD1: 4

## 2025-06-02 ASSESSMENT — REFRACTION_MANIFEST
OS_AXIS: 127
METHOD_AUTOREFRACTION: 1
OS_SPHERE: -4.00
OS_CYLINDER: +2.00
OD_AXIS: 086
OD_CYLINDER: +2.25
OD_SPHERE: -4.00

## 2025-06-02 ASSESSMENT — EXTERNAL EXAM - RIGHT EYE: OD_EXAM: MILD PROPTOSIS

## 2025-06-02 ASSESSMENT — VISUAL ACUITY
OD_CC: 20/20
METHOD: SNELLEN - LINEAR
OD_CC: J1+
OS_CC: J1+
OS_CC: 20/20
CORRECTION_TYPE: GLASSES

## 2025-06-02 ASSESSMENT — LAGOPHTHALMOS
OS_LAGOPHTHALMOS: 0
OD_LAGOPHTHALMOS: 0

## 2025-06-02 ASSESSMENT — CUP TO DISC RATIO
OS_RATIO: 0.6
OD_RATIO: 0.6

## 2025-06-02 ASSESSMENT — SLIT LAMP EXAM - LIDS
COMMENTS: LID RETRACTION
COMMENTS: NORMAL

## 2025-06-02 NOTE — ASSESSMENT & PLAN NOTE
A1c 6.6  Meds: Mounjaro 5mg  No evidence of diabetic retinopathy  Discussed tight glycemic management on tepezza

## 2025-06-02 NOTE — PROGRESS NOTES
Peds/Neuro Ophthalmology:   Fernando Fontaine    Date & Time note created:    6/2/2025   9:46 AM     Referring MD / APRN:  Manjeet Moyer III, M.D., No att. providers found    Patient ID:  Name:             Giuseppe Fried   YOB: 1975  Age:                 49 y.o.  male   MRN:               [id number]    Chief Complaint/Reason for Visit:     Other (Thyroid eye Disease)      History of Present Illness:    Giuseppe Swift is a 49 y.o. male   Other  Associated symptoms include headaches.       Review of Systems:  Review of Systems   Eyes:  Positive for photophobia.   Neurological:  Positive for headaches.   All other systems reviewed and are negative.      Past Medical History:   Past Medical History[1]    Past Surgical History:  Past Surgical History[2]    Current Outpatient Medications:  Current Medications[3]    Allergies:  Allergies[4]    Family History:  Family History   Problem Relation Age of Onset    Diabetes Mother     Glasses Mother     No Known Problems Father     Diabetes Sister         T2    Accidental Death Brother        Social History:  Social History     Socioeconomic History    Marital status:      Spouse name: Not on file    Number of children: Not on file    Years of education: Not on file    Highest education level: Associate degree: occupational, technical, or vocational program   Occupational History    Not on file   Tobacco Use    Smoking status: Never    Smokeless tobacco: Never   Vaping Use    Vaping status: Never Used   Substance and Sexual Activity    Alcohol use: Not Currently     Comment: occasional, rare    Drug use: No    Sexual activity: Yes     Partners: Female     Birth control/protection: Surgical   Other Topics Concern    Not on file   Social History Narrative    Works at [employer]     Social Drivers of Health     Financial Resource Strain: Low Risk  (1/25/2021)    Overall Financial Resource Strain (CARDIA)     Difficulty of Paying Living  Expenses: Not hard at all   Food Insecurity: No Food Insecurity (1/25/2021)    Hunger Vital Sign     Worried About Running Out of Food in the Last Year: Never true     Ran Out of Food in the Last Year: Never true   Transportation Needs: No Transportation Needs (1/25/2021)    PRAPARE - Transportation     Lack of Transportation (Medical): No     Lack of Transportation (Non-Medical): No   Physical Activity: Sufficiently Active (1/25/2021)    Exercise Vital Sign     Days of Exercise per Week: 4 days     Minutes of Exercise per Session: 120 min   Stress: Stress Concern Present (1/25/2021)    [place] [employer] - Occupational Stress Questionnaire     Feeling of Stress : To some extent   Social Connections: Moderately Integrated (1/25/2021)    Social Connection and Isolation Panel [NHANES]     Frequency of Communication with Friends and Family: More than three times a week     Frequency of Social Gatherings with Friends and Family: Once a week     Attends Yazidism Services: More than 4 times per year     Active Member of Clubs or Organizations: Yes     Attends Club or Organization Meetings: More than 4 times per year     Marital Status:    Intimate Partner Violence: Not on file   Housing Stability: Unknown (1/25/2021)    Housing Stability Vital Sign     Unable to Pay for Housing in the Last Year: No     Number of Places Lived in the Last Year: Not on file     Unstable Housing in the Last Year: No          Physical Exam:  Physical Exam    Oriented x 3  Weight/BMI: There is no height or weight on file to calculate BMI.  There were no vitals taken for this visit.    Base Eye Exam       Visual Acuity (Snellen - Linear)         Right Left    Dist cc 20/20 20/20    Near cc J1+ J1+      Correction: Glasses              Tonometry (i care, 9:43 AM)         Right Left    Pressure 17 17              Pupils         Pupils    Right PERRL    Left PERRL                  Additional Tests       Color          "Right Left    [place] 9/9 9/9              Stereo       Fly: +    Animals: 3/3    Circles: 9/9                  Refraction       Wearing Rx         Sphere Cylinder Axis Add    Right -4.00 +2.25 088 +2.00    Left -4.25 +2.00 129 +1.75      Age: 3m    Type: Trifocal              Manifest Refraction (Auto)         Sphere Cylinder Axis    Right -4.00 +2.25 086    Left -4.00 +2.00 127                    Pertinent Lab/Test/Imaging Review:      Assessment and Plan:     No problem-specific Assessment & Plan notes found for this encounter.        Fernando Fontaine       [1]   Past Medical History:  Diagnosis Date    Bowel habit changes 02/2021    constipation and diarrhea    Glaucoma     L eye    Heart burn     High cholesterol     Hypertension     Myocardial infarct (HCC)     Snoring     Tuberculosis 2008    no treatment, \"not active\"   [2]   Past Surgical History:  Procedure Laterality Date    [place] UPPER GI ENDOSCOPY,DIAGNOSIS  2/22/2021    Procedure: GASTROSCOPY;  Surgeon: Jcarlos Thomas M.D.;  Location: [facility];  Service: EUS    EGD W/ENDOSCOPIC ULTRASOUND  2/22/2021    Procedure: EGD, WITH ENDOSCOPIC US - UPPER RADIAL LINEAR;  Surgeon: Jcarlos Thomas M.D.;  Location: [facility];  Service: EUS    VASECTOMY  2007    FUSION, SPINE, LUMBAR, PLIF  2004    L4-S1   [3]   Current Outpatient Medications   Medication Sig Dispense Refill    aspirin EC (ECOTRIN) 81 MG Tablet Delayed Response Take 162 mg by mouth every morning. 2 tablets = 162 mg.      Tirzepatide (MOUNJARO) 5 MG/0.5ML Solution Auto-injector Inject 0.5 mL under the skin every 7 days. 2 mL 5    testosterone cypionate (DEPO-TESTOSTERONE) 200 MG/ML injection INJECT 1 ML INTRAMUSCULARLY INTO THE SHOULDER, THIGH OR BUTTOCKS ONCE EVERY 2 WEEKS.  For 90 days 6 mL 0    telmisartan (MICARDIS) 80 MG Tab TAKE 1 TABLET BY MOUTH ONCE DAILY AT BEDTIME TO  LOWER  BLOOD  PRESSURE. (Patient not taking: Reported on 6/2/2025) 100 Tablet 3    calcium carbonate " "(LAURENCE-SELTZER HEARTBURN) 750 MG chewable tablet Chew 3,000-3,750 mg every 2 hours as needed (heartburn).      Multiple Vitamins-Minerals (MENS ONE DAILY PO) Take 1 Tablet by mouth every morning.      lansoprazole (PREVACID) 30 MG CAPSULE DELAYED RELEASE Take 1 Capsule by mouth every day. 90 Capsule 2    Evolocumab (REPATHA SURECLICK) 140 MG/ML Solution Auto-injector SubQ injection pen Inject 1 mL under the skin every 14 days. 2.1 Each 12    levothyroxine (SYNTHROID) 112 MCG Tab Take 1 Tablet by mouth every morning on an empty stomach. 90 Tablet 3    nitroglycerin (NITROSTAT) 0.4 MG SL Tab Place 1 Tablet under the tongue as needed for Chest Pain. 25 Tablet 11    Syringe/Needle, Disp, (SYRINGE 3CC/23GX1\") 23G X 1\" 3 ML Misc Use with testosterone every 2 weeks 6 Each 3    glucose blood strip 1 Strip by Other route every day. 100 Strip 3    fexofenadine (ALLEGRA) 180 MG tablet Take 180 mg by mouth every day.       No current facility-administered medications for this visit.   [4]   Allergies  Allergen Reactions    Iodine Hives, Rash and Swelling     Topical - rash  IV contrast - full body hives and swelling    Lisinopril Cough    Pineapple Itching and Swelling     Tongue swelling and itchy throat    Shellfish Allergy Anaphylaxis    Biofreeze Rash     Rash and blisters    Cat Hair Extract Hives, Shortness of Breath, Rash, Runny Nose, Itching, Swelling and Anxiety          Latex Rash          Mushroom Extract Complex Swelling     Swelling and mouth numbness     Rosuvastatin      Myalgias, elevated CK    Atorvastatin Myalgia     Myaglias     "

## 2025-06-02 NOTE — ASSESSMENT & PLAN NOTE
48 yo man with PMH of hypothyroidism, HTN, DM2, who presents for new patient evaluation of thyroid orbitopathy    Began developing ocular symptoms 6 months ago, coinciding with worsening thyroid stability.     Eye symptoms:  +dry eye (redness, irritation, tearing)  +Proptosis OD  - eye pain  + occasional pain with eye movements  - double vision (binocular)  + blurred vision and monocular diplopia    Exam 6/2/25: JUANA 4 with proptosis OD and lid retraction. VA 20/20 OD and OS, no APD, color plates 9/9 OD and OS, full CF. EOM full, ortho in all gazes. Optic nerves pink with sharp disc margins. 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 Pt has history of hypothryoidism and had worsening thyroid function 11/2024 corresponding to onset of ocular symptoms. Given JUANA score and proptosis, discussed conservative treatment vs medical treatment (steroids vs steroids). After thorough discussion of tepezza infusion and potential side effects (hyperglycemia, elevated BP, cramping, hair loss, hearing loss), pt opted to pursue tepezza. Pt to obtain baseline hearing test and send results back prior to initiation. Denies any baseline hearing concerns. Due to history of HTN and DM2, pt to follow closely with Dr Moyer during infusions. As Thryoid orbitopathy is less common with hypothyroidism, will obtain IgG subclasses, thyroid antibodies, and curtis prior to initiation of tepezza    -Labs: CURTIS, ANCA, TSI, Thyroid receptor ab, anti-thyroglobulin, IgG subclasses  -Pending tepezza  -Baseline hearing test  -RTC in 2-3 months, following tepezza.   -Monitor BP and sugars while on tepezza  -Conservative management: Increased AT use, HOB elevation

## 2025-06-02 NOTE — PROGRESS NOTES
Peds/Neuro Ophthalmology:   Dami Chawla M.D.    Date & Time note created:    6/2/2025   11:34 AM     Referring MD / APRN:  Manjeet Moyer III, M.D., No att. providers found    Patient ID:  Name:             Giuseppe Fried   YOB: 1975  Age:                 49 y.o.  male   MRN:               [id number]    Chief Complaint/Reason for Visit:     Other (Thyroid eye Disease)      History of Present Illness:      Giuseppe Swift is a 50 yo man referred for new patient evaluation of possible Thyroid eye    He is referred by Dr Menchaca. Per patient Dr Menchaca started noticing changes in the appearance of his R eye 11/2024.     Giuseppe was diagnosed with hypothyroidism around 2019, and was started on levothyroxine in 2020. He is currently on 112mcg levothyroxine. Giuseppe started to notice eye issues around 11/2024, which is incidentally when his thyroid function became unstable again. Most recent thyroid test was normal. His ocular symptoms are described below. He denies any smoking history, Crohn's disease, ulcerative colitis,  denies hearing loss, htn, dm2    Ocular symptoms  1) blurred vision  2) burning, redness, increased tearing of eyes. Uses AT once a week  3) fatigue of vision  4) intermittent double vision. Will last 10 minutes. Does not improve with closure of either eye.   5) Denies eye pain, but will have occasional pain with eye movement  6) R eye bulging    Giuseppe also reports worsening headaches. The headaches are described as  Location: frontal, occipital region  Severity: 5-10/1  Characteristic: squeezing sensation, pressure, throbbing  Associated symptoms: light/sound sensitivity, nausea  Duration: 2-3 hours  Frequency: once or twice a week, 7 days no headaches in a month      Review of Systems:  ROS    Past Medical History:   Past Medical History[1]    Past Surgical History:  Past Surgical History[2]    Current Outpatient Medications:  Current  Medications[3]    Allergies:  Allergies[4]    Family History:  Family History   Problem Relation Age of Onset    Diabetes Mother     Glasses Mother     No Known Problems Father     Diabetes Sister         T2    Accidental Death Brother        Social History:  Social History     Socioeconomic History    Marital status:      Spouse name: Not on file    Number of children: Not on file    Years of education: Not on file    Highest education level: Associate degree: occupational, technical, or vocational program   Occupational History    Not on file   Tobacco Use    Smoking status: Never    Smokeless tobacco: Never   Vaping Use    Vaping status: Never Used   Substance and Sexual Activity    Alcohol use: Not Currently     Comment: occasional, rare    Drug use: No    Sexual activity: Yes     Partners: Female     Birth control/protection: Surgical   Other Topics Concern    Not on file   Social History Narrative    Works at [employer]     Social Drivers of Health     Financial Resource Strain: Low Risk  (1/25/2021)    Overall Financial Resource Strain (CARDIA)     Difficulty of Paying Living Expenses: Not hard at all   Food Insecurity: No Food Insecurity (1/25/2021)    Hunger Vital Sign     Worried About Running Out of Food in the Last Year: Never true     Ran Out of Food in the Last Year: Never true   Transportation Needs: No Transportation Needs (1/25/2021)    PRAPARE - Transportation     Lack of Transportation (Medical): No     Lack of Transportation (Non-Medical): No   Physical Activity: Sufficiently Active (1/25/2021)    Exercise Vital Sign     Days of Exercise per Week: 4 days     Minutes of Exercise per Session: 120 min   Stress: Stress Concern Present (1/25/2021)    [employer] - Occupational Stress Questionnaire     Feeling of Stress : To some extent   Social Connections: Moderately Integrated (1/25/2021)    Social Connection and Isolation Panel [NHANES]     Frequency of Communication  with Friends and Family: More than three times a week     Frequency of Social Gatherings with Friends and Family: Once a week     Attends Rastafari Services: More than 4 times per year     Active Member of Clubs or Organizations: Yes     Attends Club or Organization Meetings: More than 4 times per year     Marital Status:    Intimate Partner Violence: Not on file   Housing Stability: Unknown (1/25/2021)    Housing Stability Vital Sign     Unable to Pay for Housing in the Last Year: No     Number of Places Lived in the Last Year: Not on file     Unstable Housing in the Last Year: No          Physical Exam:  Physical Exam    Oriented x 3  Weight/BMI: There is no height or weight on file to calculate BMI.  There were no vitals taken for this visit.    Base Eye Exam       Visual Acuity (Snellen - Linear)         Right Left    Dist cc 20/20 20/20    Near cc J1+ J1+      Correction: Glasses              Tonometry (i care, 9:43 AM)         Right Left    Pressure 17 17              Pupils         Pupils Dark Light Shape React APD    Right PERRL 4 2 Round Brisk None    Left PERRL 4 2 Round Brisk None              Visual Fields         Right Left     Full Full              Extraocular Movement         Right Left     Full Full              Neuro/Psych       Oriented x3: Yes    Mood/Affect: Normal                  Additional Tests       Color         Right Left    Ishihara 9/9 9/9              Stereo       Fly: +    Animals: 3/3    Circles: 9/9                  Strabismus Exam       Method: Alternate cover    Correction: sc      Distance Near Near +3DS N Bifocals                      - - - - - -  Ortho  - - - - - -                      Ortho  - -  - -  Ortho  - -  - -  Ortho                      - - - - - -  Ortho  - - - - - -                       Slit Lamp and Fundus Exam       External Exam         Right Left    External mild proptosis     MRD1 4 mm 2 mm    MRD2 5 mm 4 mm    Superior scleral show 0 mm 0 mm     Inferior scleral show 0 mm 0 mm    Lagophthalmos 0 mm 0 mm              Exophthalmometry (Base: 124 mm)         Right Left     19 mm 17 mm              Slit Lamp Exam         Right Left    Lids/Lashes lid retraction Normal    Conjunctiva/Sclera chemosis, injection, plica and caruncle inflammation chemosis, injection, plica and caruncle inflammation    Cornea Clear Clear    Anterior Chamber Deep and quiet Deep and quiet    Iris Round and reactive Round and reactive    Lens Clear Clear              Fundus Exam         Right Left    Disc pink, sharp disc margins, flat pink, sharp disc margins, flat    C/D Ratio 0.6 0.6    Macula Normal Normal                  Refraction       Wearing Rx         Sphere Cylinder Axis Add    Right -4.00 +2.25 088 +2.00    Left -4.25 +2.00 129 +1.75      Age: 3m    Type: Trifocal              Manifest Refraction (Auto)         Sphere Cylinder Axis    Right -4.00 +2.25 086    Left -4.00 +2.00 127                    Pertinent Lab/Test/Imaging Review:  TSH 7.58H 10/21/24    A1c 6.6H  Lipid 151    CT orbits 2/10/25  1. Mild proptosis bilaterally.  2. 1.4 cm left maxillary sinus mucosal retention cyst.  3. Nasal septum is deviated to the right.  My read: mild proptosis, no EOM enlargement    Assessment and Plan:     Acquired hypothyroidism  Diagnosed around 2019  Meds: Synthroid 112mcg daily  Managed by PCP Dr Moyer  Stable     Thyroid orbitopathy  48 yo man with PMH of hypothyroidism, HTN, DM2, who presents for new patient evaluation of thyroid orbitopathy    Began developing ocular symptoms 6 months ago, coinciding with worsening thyroid stability.     Eye symptoms:  +dry eye (redness, irritation, tearing)  +Proptosis OD  - eye pain  + occasional pain with eye movements  - double vision (binocular)  + blurred vision and monocular diplopia    Exam 6/2/25: JUANA 4 with proptosis OD and lid retraction. VA 20/20 OD and OS, no APD, color plates 9/9 OD and OS, full CF. EOM full, ortho in all gazes.  "Optic nerves pink with sharp disc margins. 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 Pt has history of hypothryoidism and had worsening thyroid function 11/2024 corresponding to onset of ocular symptoms. Given JUANA score and proptosis, discussed conservative treatment vs medical treatment (steroids vs steroids). After thorough discussion of tepezza infusion and potential side effects (hyperglycemia, elevated BP, cramping, hair loss, hearing loss), pt opted to pursue tepezza. Pt to obtain baseline hearing test and send results back prior to initiation. Denies any baseline hearing concerns. Due to history of HTN and DM2, pt to follow closely with Dr Moyer during infusions. As Thryoid orbitopathy is less common with hypothyroidism, will obtain IgG subclasses, thyroid antibodies, and curtis prior to initiation of tepezza    -Labs: CURTIS, ANCA, TSI, Thyroid receptor ab, anti-thyroglobulin, IgG subclasses  -Pending tepezza  -Baseline hearing test  -RTC in 2-3 months, following tepezza.   -Monitor BP and sugars while on tepezza  -Conservative management: Increased AT use, HOB elevation     Type 2 diabetes mellitus with diabetic microalbuminuria, without long-term current use of insulin ([facility])  A1c 6.6  Meds: Mounjaro 5mg  No evidence of diabetic retinopathy  Discussed tight glycemic management on tepezza    Myopia of both eyes  OD -4.00 +2.25 x 88  OS -4.25 +2.00 x129  VA 20/20 OD and OS  Stable        Dami Chawla M.D.  82 total minutes were spent reviewing imaging, records, examining the patient and documenting.          [1]   Past Medical History:  Diagnosis Date    Bowel habit changes 02/2021    constipation and diarrhea    Glaucoma     L eye    Heart burn     High cholesterol     Hypertension     Myocardial infarct (HCC)     Snoring     Tuberculosis 2008    no treatment, \"not active\"   [2]   Past Surgical History:  Procedure Laterality Date    ME UPPER GI " "ENDOSCOPY,DIAGNOSIS  2/22/2021    Procedure: GASTROSCOPY;  Surgeon: Jcarlos Thomas M.D.;  Location: SURGERY [facility];  Service: EUS    EGD W/ENDOSCOPIC ULTRASOUND  2/22/2021    Procedure: EGD, WITH ENDOSCOPIC US - UPPER RADIAL LINEAR;  Surgeon: Jcarlos Thomas M.D.;  Location: SURGERY [facility];  Service: EUS    VASECTOMY  2007    FUSION, SPINE, LUMBAR, PLIF  2004    L4-S1   [3]   Current Outpatient Medications   Medication Sig Dispense Refill    Tirzepatide (MOUNJARO) 5 MG/0.5ML Solution Auto-injector Inject 0.5 mL under the skin every 7 days. 2 mL 5    testosterone cypionate (DEPO-TESTOSTERONE) 200 MG/ML injection INJECT 1 ML INTRAMUSCULARLY INTO THE SHOULDER, THIGH OR BUTTOCKS ONCE EVERY 2 WEEKS.  For 90 days 6 mL 0    calcium carbonate (LAURENCE-SELTZER HEARTBURN) 750 MG chewable tablet Chew 3,000-3,750 mg every 2 hours as needed (heartburn).      Multiple Vitamins-Minerals (MENS ONE DAILY PO) Take 1 Tablet by mouth every morning.      lansoprazole (PREVACID) 30 MG CAPSULE DELAYED RELEASE Take 1 Capsule by mouth every day. 90 Capsule 2    Evolocumab (REPATHA SURECLICK) 140 MG/ML Solution Auto-injector SubQ injection pen Inject 1 mL under the skin every 14 days. 2.1 Each 12    levothyroxine (SYNTHROID) 112 MCG Tab Take 1 Tablet by mouth every morning on an empty stomach. 90 Tablet 3    nitroglycerin (NITROSTAT) 0.4 MG SL Tab Place 1 Tablet under the tongue as needed for Chest Pain. 25 Tablet 11    Syringe/Needle, Disp, (SYRINGE 3CC/23GX1\") 23G X 1\" 3 ML Misc Use with testosterone every 2 weeks 6 Each 3    glucose blood strip 1 Strip by Other route every day. 100 Strip 3    aspirin EC (ECOTRIN) 81 MG Tablet Delayed Response Take 162 mg by mouth every morning. 2 tablets = 162 mg.      fexofenadine (ALLEGRA) 180 MG tablet Take 180 mg by mouth every day.      telmisartan (MICARDIS) 80 MG Tab TAKE 1 TABLET BY MOUTH ONCE DAILY AT BEDTIME TO  LOWER  BLOOD  PRESSURE. (Patient not taking: Reported on 6/2/2025) 100 Tablet 3 "     No current facility-administered medications for this visit.   [4]   Allergies  Allergen Reactions    Iodine Hives, Rash and Swelling     Topical - rash  IV contrast - full body hives and swelling    Lisinopril Cough    Pineapple Itching and Swelling     Tongue swelling and itchy throat    Shellfish Allergy Anaphylaxis    Biofreeze Rash     Rash and blisters    Cat Hair Extract Hives, Shortness of Breath, Rash, Runny Nose, Itching, Swelling and Anxiety          Latex Rash          Mushroom Extract Complex Swelling     Swelling and mouth numbness     Rosuvastatin      Myalgias, elevated CK    Atorvastatin Myalgia     Myaglias

## 2025-06-02 NOTE — PROCEDURES
Impression: Tear film insufficiency of bilateral lacrimal glands: H04.123. Plan: Dry eyes account for the patient's complaints. There is no evidence of permanent changes to the cornea. Explained condition does not have a cure and will need artificial tears for maintenance. 

Pt to use AT's QID OU OD: pink, sharp disc margins. CD 0.6    OS: pink, sharp disc margins, CD 0.6

## 2025-06-04 ENCOUNTER — HOSPITAL ENCOUNTER (OUTPATIENT)
Dept: LAB | Facility: MEDICAL CENTER | Age: 50
End: 2025-06-04
Attending: STUDENT IN AN ORGANIZED HEALTH CARE EDUCATION/TRAINING PROGRAM
Payer: COMMERCIAL

## 2025-06-04 DIAGNOSIS — E07.9 THYROID ORBITOPATHY: ICD-10-CM

## 2025-06-04 DIAGNOSIS — H05.89 THYROID ORBITOPATHY: ICD-10-CM

## 2025-06-04 PROCEDURE — 86376 MICROSOMAL ANTIBODY EACH: CPT

## 2025-06-04 PROCEDURE — 84445 ASSAY OF TSI GLOBULIN: CPT

## 2025-06-04 PROCEDURE — 83520 IMMUNOASSAY QUANT NOS NONAB: CPT

## 2025-06-04 PROCEDURE — 83516 IMMUNOASSAY NONANTIBODY: CPT

## 2025-06-04 PROCEDURE — 82787 IGG 1 2 3 OR 4 EACH: CPT | Mod: 91

## 2025-06-04 PROCEDURE — 36415 COLL VENOUS BLD VENIPUNCTURE: CPT

## 2025-06-04 PROCEDURE — 86038 ANTINUCLEAR ANTIBODIES: CPT

## 2025-06-05 LAB — THYROPEROXIDASE AB SERPL-ACNC: 179 IU/ML (ref 0–9)

## 2025-06-06 LAB
IGG1 SER-MCNC: 690 MG/DL (ref 240–1118)
IGG2 SER-MCNC: 192 MG/DL (ref 124–549)
IGG3 SER-MCNC: 45 MG/DL (ref 21–134)
IGG4 SER-MCNC: 1 MG/DL (ref 1–123)
NUCLEAR IGG SER QL IA: NORMAL
TSH RECEP AB SER-ACNC: <1.1 IU/L

## 2025-06-07 LAB — TSI SER-ACNC: <0.1 IU/L

## 2025-06-08 LAB
MYELOPEROXIDASE AB SER-ACNC: 0 AU/ML (ref 0–19)
PROTEINASE3 AB SER-ACNC: 0 AU/ML (ref 0–19)

## 2025-06-16 ENCOUNTER — APPOINTMENT (OUTPATIENT)
Facility: MEDICAL CENTER | Age: 50
End: 2025-06-16
Attending: INTERNAL MEDICINE
Payer: COMMERCIAL

## 2025-06-16 DIAGNOSIS — I25.2 HISTORY OF ACUTE MYOCARDIAL INFARCTION: ICD-10-CM

## 2025-06-16 DIAGNOSIS — I10 PRIMARY HYPERTENSION: ICD-10-CM

## 2025-06-16 DIAGNOSIS — Z78.9 STATIN INTOLERANCE: ICD-10-CM

## 2025-06-16 DIAGNOSIS — R80.9 TYPE 2 DIABETES MELLITUS WITH DIABETIC MICROALBUMINURIA, WITHOUT LONG-TERM CURRENT USE OF INSULIN (HCC): ICD-10-CM

## 2025-06-16 DIAGNOSIS — R74.8 ELEVATED CK: ICD-10-CM

## 2025-06-16 DIAGNOSIS — E11.29 TYPE 2 DIABETES MELLITUS WITH DIABETIC MICROALBUMINURIA, WITHOUT LONG-TERM CURRENT USE OF INSULIN (HCC): ICD-10-CM

## 2025-06-16 DIAGNOSIS — E78.00 PURE HYPERCHOLESTEROLEMIA: ICD-10-CM

## 2025-06-16 DIAGNOSIS — Z95.5 STENTED CORONARY ARTERY: ICD-10-CM

## 2025-06-16 DIAGNOSIS — Z88.8 ALLERGY TO IODINE: ICD-10-CM

## 2025-06-16 DIAGNOSIS — I24.0 ISCHEMIC HEART DISEASE DUE TO CORONARY ARTERY OBSTRUCTION (HCC): Primary | ICD-10-CM

## 2025-06-16 DIAGNOSIS — I25.9 ISCHEMIC HEART DISEASE DUE TO CORONARY ARTERY OBSTRUCTION (HCC): Primary | ICD-10-CM

## 2025-06-16 DIAGNOSIS — M60.89 OTHER MYOSITIS OF MULTIPLE SITES: ICD-10-CM

## 2025-06-17 ENCOUNTER — PATIENT MESSAGE (OUTPATIENT)
Dept: CARDIOLOGY | Facility: MEDICAL CENTER | Age: 50
End: 2025-06-17
Payer: COMMERCIAL

## 2025-06-18 ENCOUNTER — APPOINTMENT (OUTPATIENT)
Facility: MEDICAL CENTER | Age: 50
End: 2025-06-18
Attending: INTERNAL MEDICINE
Payer: COMMERCIAL

## 2025-06-20 NOTE — PROGRESS NOTES
I spoke with Giuseppe on the phone.  His neuro-ophthalmologist is suggesting Tepezza for thyroid related vision problems.  There are no cardiac contraindications.  Giuseppe knows to watch for elevated hypertension or tachycardia which can be managed medically.  We will also keep an eye on triglycerides.  I have sent a communication to his neuro-ophthalmologist to relay the clearance from cardiology.  I will see Giuseppe in August.

## 2025-06-23 ENCOUNTER — OFFICE VISIT (OUTPATIENT)
Dept: MEDICAL GROUP | Facility: PHYSICIAN GROUP | Age: 50
End: 2025-06-23
Payer: COMMERCIAL

## 2025-06-23 VITALS — BODY MASS INDEX: 31.5 KG/M2 | HEIGHT: 71 IN | WEIGHT: 225 LBS | RESPIRATION RATE: 15 BRPM

## 2025-06-23 DIAGNOSIS — E11.29 TYPE 2 DIABETES MELLITUS WITH DIABETIC MICROALBUMINURIA, WITHOUT LONG-TERM CURRENT USE OF INSULIN (HCC): Primary | ICD-10-CM

## 2025-06-23 DIAGNOSIS — R80.9 TYPE 2 DIABETES MELLITUS WITH DIABETIC MICROALBUMINURIA, WITHOUT LONG-TERM CURRENT USE OF INSULIN (HCC): Primary | ICD-10-CM

## 2025-06-23 LAB
HBA1C MFR BLD: 6.2 % (ref ?–5.8)
POCT INT CON NEG: NEGATIVE
POCT INT CON POS: POSITIVE

## 2025-06-23 PROCEDURE — 83036 HEMOGLOBIN GLYCOSYLATED A1C: CPT | Performed by: INTERNAL MEDICINE

## 2025-06-23 PROCEDURE — 99401 PREV MED CNSL INDIV APPRX 15: CPT | Performed by: INTERNAL MEDICINE

## 2025-06-23 ASSESSMENT — FIBROSIS 4 INDEX: FIB4 SCORE: 1.18

## 2025-06-23 NOTE — PROGRESS NOTES
Patient Consult Note - Follow Up Visit  Primary care physician: Manjeet Moyer III, M.D.    Reason for consult: Management of Uncontrolled Type 2 Diabetes    Time IN:  2:01pm  Time OUT:  2:23pm    HPI:  Giuseppe Swift is a 49 y.o. old patient who comes in today for evaluation of above stated problem.    Most Recent HbA1c:   Lab Results   Component Value Date/Time    HBA1C 6.2 (A) 06/23/2025 02:05 PM          Reliable and Exact Care Diabetes Score    Displays the Diabetes REC Score.  A patient gets 1 point for each measure for a maximum of 7 points   0  Measures Not Met          Current Diabetes Regimen:  GLP1a/GIP:  Mounjaro 5mg SQ once weekly    Previously attempted medications:  Rybelsus - stopped by previous PCP outside of [facility]  Metformin - GI distress  Glimepiride - stopped on own accord    Discussed FBG goal of , 2hrPP <180, and A1c <7.0%.  Before Breakfast:  86 99 80 111  Before Lunch:  Before Dinner:  Before Bedtime:  Other times:  Hypoglycemia:  None    Lifestyle:  No appreciable changes to nutrition habits, other than timing of first meal of the day.  Continues to integrate good amount of protein and fruit throughout the day.  Has got back to consistent exercise with plans to start his normal 90 day rotation for strength training.    Previous visit notes:  1/2025  Has been working to incorporate more protein to nutrition habits and minimize carbs.  He and wife unfortunately suffered a GI illness that caused good amount of nausea, vomiting, and diarrhea.  Was eating bland, carb heavy foods during that time, but is now back to normal eating habits.  Noted appetite suppression with Mounjaro.  Cleared to return to resistance training Feb 1st.    12/2024  Current Exercise - None at this time as directed by cardiology  Exercise Goal - Would highly benefit from at least 150-180 min/week moderate intensity exercise.  Previous hx of resistance training on regular basis along with cardio work.  With  "recent CPK elevations and associated myalgias, has been instructed to holding off on exercise until levels return to baseline.     Nutrition -   Has good understanding of nutrition associated with diabetes and cardiovascular health.  Patient states that he does not necessarily \"cut anything out\" as he look for good mix of macronutrients.  He states that he and wife focus on one true meal per day, but admits that he can get into a good deal of snacking throughout the day.  Does admit to chips, crackers, good deal of fruits, yogurts as part of snacking.  Portions seem to be under good control.    Preventative Management  BP regimen (ACEi/ARB): Losartan 50mg QD  BP <140/90: Yes  Statin:  Repatha 140mg SQ every 14 days  LDL <100: No, labs ordered today  Lab Results   Component Value Date/Time    CHOLSTRLTOT 121 04/10/2025 02:11 PM    LDL 51 04/10/2025 02:11 PM    HDL 33 (A) 04/10/2025 02:11 PM    TRIGLYCERIDE 183 (H) 04/10/2025 02:11 PM       Last Microalbumin/Cr:  Lab Results   Component Value Date/Time    MALBCRT 404 (H) 03/26/2025 10:54 AM    MICROALBUR 54.5 03/26/2025 10:54 AM     Last A1c:  Lab Results   Component Value Date/Time    HBA1C 6.2 (A) 06/23/2025 02:05 PM      Monofilament exam: will need to conduct at next visit      REC DM Score: 0  Care gaps addressed:   N/A  Care gaps updated in Health Maintenance    ROS:  Constitutional: No weight loss  Cardiac: No palpitations or racing heart  Resp: No shortness of breath  Neuro: No numbness or tinging in feet  Endo: No heat or cold intolerance, no polyuria or polydipsia  All other systems were reviewed and were negative.    Past Medical History:  Patient Active Problem List    Diagnosis Date Noted    Thyroid orbitopathy 06/02/2025    Myopia of both eyes 06/02/2025    Elevated hemoglobin (HCC) 03/31/2025    History of non-ST elevation myocardial infarction (NSTEMI) 03/03/2025    Stented coronary artery 03/03/2025    Hepatic steatosis 03/03/2025    Elevated CPK " 03/03/2025    Class 1 obesity with serious comorbidity and body mass index (BMI) of 33.0 to 33.9 in adult, unspecified obesity type 03/03/2025    Statin intolerance 03/03/2025    Myalgia due to statin 03/03/2025    Testicular hypofunction 03/03/2025    Long-term current use of testosterone replacement therapy 03/03/2025    Seasonal allergic rhinitis due to pollen 05/07/2024    Wellness examination 10/24/2023    Chronic fatigue 09/28/2021    Stress 01/25/2021    Acquired hypothyroidism 09/11/2019    Type 2 diabetes mellitus with diabetic microalbuminuria, without long-term current use of insulin (HCC) 09/11/2019    Obesity (BMI 30-39.9) 08/16/2019    GERD (gastroesophageal reflux disease) 08/16/2019    Coronary artery disease involving native coronary artery of native heart without angina pectoris 07/11/2019    Pure hypercholesterolemia 07/11/2019    Low HDL (under 40) 07/11/2019    Primary hypertension 07/11/2019       Past Surgical History:  Past Surgical History:   Procedure Laterality Date    OR UPPER GI ENDOSCOPY,DIAGNOSIS  2/22/2021    Procedure: GASTROSCOPY;  Surgeon: Jcarlos Thomas M.D.;  Location: SURGERY [facility];  Service: EUS    EGD W/ENDOSCOPIC ULTRASOUND  2/22/2021    Procedure: EGD, WITH ENDOSCOPIC US - UPPER RADIAL LINEAR;  Surgeon: Jcarlos Thomas M.D.;  Location: SURGERY [facility];  Service: EUS    VASECTOMY  2007    FUSION, SPINE, LUMBAR, PLIF  2004    L4-S1       Allergies:  Iodine, Lisinopril, Pineapple, Shellfish allergy, Biofreeze, Cat hair extract, Latex, Mushroom extract complex, and Atorvastatin    Social History:  Social History     Socioeconomic History    Marital status:      Spouse name: Not on file    Number of children: Not on file    Years of education: Not on file    Highest education level: Associate degree: occupational, technical, or vocational program   Occupational History    Not on file   Tobacco Use    Smoking status: Never    Smokeless tobacco: Never   Vaping Use     Vaping status: Never Used   Substance and Sexual Activity    Alcohol use: Not Currently     Comment: occasional, rare    Drug use: No    Sexual activity: Yes     Partners: Female     Birth control/protection: Surgical   Other Topics Concern    Not on file   Social History Narrative    Works at [employer]     Social Drivers of Health     Financial Resource Strain: Low Risk  (1/25/2021)    Overall Financial Resource Strain (CARDIA)     Difficulty of Paying Living Expenses: Not hard at all   Food Insecurity: No Food Insecurity (1/25/2021)    Hunger Vital Sign     Worried About Running Out of Food in the Last Year: Never true     Ran Out of Food in the Last Year: Never true   Transportation Needs: No Transportation Needs (1/25/2021)    PRAPARE - Transportation     Lack of Transportation (Medical): No     Lack of Transportation (Non-Medical): No   Physical Activity: Sufficiently Active (1/25/2021)    Exercise Vital Sign     Days of Exercise per Week: 4 days     Minutes of Exercise per Session: 120 min   Stress: Stress Concern Present (1/25/2021)    [place] [employer] - Occupational Stress Questionnaire     Feeling of Stress : To some extent   Social Connections: Moderately Integrated (1/25/2021)    Social Connection and Isolation Panel [NHANES]     Frequency of Communication with Friends and Family: More than three times a week     Frequency of Social Gatherings with Friends and Family: Once a week     Attends Restorationism Services: More than 4 times per year     Active Member of Clubs or Organizations: Yes     Attends Club or Organization Meetings: More than 4 times per year     Marital Status:    Intimate Partner Violence: Not on file   Housing Stability: Unknown (1/25/2021)    Housing Stability Vital Sign     Unable to Pay for Housing in the Last Year: No     Number of Places Lived in the Last Year: Not on file     Unstable Housing in the Last Year: No       Family History:  Family History  "  Problem Relation Age of Onset    Diabetes Mother     Glasses Mother     No Known Problems Father     Diabetes Sister         T2    Accidental Death Brother        Medications:    Current Outpatient Medications:     Tirzepatide (MOUNJARO) 5 MG/0.5ML Solution Auto-injector, Inject 0.5 mL under the skin every 7 days., Disp: 2 mL, Rfl: 5    testosterone cypionate (DEPO-TESTOSTERONE) 200 MG/ML injection, INJECT 1 ML INTRAMUSCULARLY INTO THE SHOULDER, THIGH OR BUTTOCKS ONCE EVERY 2 WEEKS.  For 90 days, Disp: 6 mL, Rfl: 0    telmisartan (MICARDIS) 80 MG Tab, TAKE 1 TABLET BY MOUTH ONCE DAILY AT BEDTIME TO  LOWER  BLOOD  PRESSURE. (Patient not taking: Reported on 6/2/2025), Disp: 100 Tablet, Rfl: 3    calcium carbonate (LAURENCE-SELTZER HEARTBURN) 750 MG chewable tablet, Chew 3,000-3,750 mg every 2 hours as needed (heartburn)., Disp: , Rfl:     Multiple Vitamins-Minerals (MENS ONE DAILY PO), Take 1 Tablet by mouth every morning., Disp: , Rfl:     lansoprazole (PREVACID) 30 MG CAPSULE DELAYED RELEASE, Take 1 Capsule by mouth every day., Disp: 90 Capsule, Rfl: 2    Evolocumab (REPATHA SURECLICK) 140 MG/ML Solution Auto-injector SubQ injection pen, Inject 1 mL under the skin every 14 days., Disp: 2.1 Each, Rfl: 12    levothyroxine (SYNTHROID) 112 MCG Tab, Take 1 Tablet by mouth every morning on an empty stomach., Disp: 90 Tablet, Rfl: 3    nitroglycerin (NITROSTAT) 0.4 MG SL Tab, Place 1 Tablet under the tongue as needed for Chest Pain., Disp: 25 Tablet, Rfl: 11    Syringe/Needle, Disp, (SYRINGE 3CC/23GX1\") 23G X 1\" 3 ML Misc, Use with testosterone every 2 weeks, Disp: 6 Each, Rfl: 3    glucose blood strip, 1 Strip by Other route every day., Disp: 100 Strip, Rfl: 3    aspirin EC (ECOTRIN) 81 MG Tablet Delayed Response, Take 162 mg by mouth every morning. 2 tablets = 162 mg., Disp: , Rfl:     fexofenadine (ALLEGRA) 180 MG tablet, Take 180 mg by mouth every day., Disp: , Rfl:     Labs: Reviewed    Physical Examination:  Vital " "signs: Resp 15   Ht 1.791 m (5' 10.51\")   Wt 102 kg (225 lb)   BMI 31.82 kg/m²  Body mass index is 31.82 kg/m².  General: No apparent distress, cooperative  Eyes: No scleral icterus or discharge  ENMT: Normal on external inspection of nose, lips, normal thyroid exam  Neck: No abnormal masses on inspection  Resp: Normal effort, clear to auscultation bilaterally   CVS: Regular rate and rhythm, S1 S2 normal, no murmur   Extremities: No edema  Abdomen: abdominal obesity present  Neuro: Alert and oriented  Skin: No rash  Psych: Normal mood and affect, intact memory and able to make informed decisions    Assessment and Plan:    Basic physiology of DMII was explained to patient as well as microvascular/macrovascular complications. The importance of increasing physical activity to improve diabetes control was discussed with the patient. Patient was also educated on changing diet and making better choices to help control blood sugar.    Patient tolerating current dosing of Mounjaro  Testing FBG throughout they day, most seem to be at goal at this time.  A1c conducted today, continues to improve to 6.2% (previously 6.6% 3/2025), which remains at goal.    Nutrition habits largely unchanged from last visit.  Has altered the timing of first meal of day, but continues to keep good portion control  Higher protein intake and minimizing carbs as best as possible.  He notes good amount of fatigue that has prevented him from consistent exercise.    Started Tepezza therapy in the near future.  Potential for hyperglycemia associated with Tepezza, will need to monitor closely.  Did discuss possibility of short term insulin therapy during the five month period of infusions, but will have to wait until first treatment to properly assess.    Will continue with current Mounjaro dosing for now, let patient know that if he is comfortable with dose increase to let clinic know.  Stressed importance of continued focus on nutrition habits, " optimize protein intake, minimize carbs.  Continue to increase exercise.    Follow Up:  One month to assess blood glucose after starting Tepezza    Thank you for allowing me to participate in the care of this patient.    Weston Manriquez, PharmD, [facility]  06/23/2025    CC:   Manjeet Moyer III, M.D.

## 2025-07-03 DIAGNOSIS — R79.89 LOW TESTOSTERONE IN MALE: ICD-10-CM

## 2025-07-03 RX ORDER — TESTOSTERONE CYPIONATE 200 MG/ML
INJECTION, SOLUTION INTRAMUSCULAR
Qty: 6 ML | Refills: 0 | Status: SHIPPED | OUTPATIENT
Start: 2025-07-03 | End: 2025-10-01

## 2025-07-21 ENCOUNTER — OFFICE VISIT (OUTPATIENT)
Dept: MEDICAL GROUP | Facility: PHYSICIAN GROUP | Age: 50
End: 2025-07-21
Payer: COMMERCIAL

## 2025-07-21 DIAGNOSIS — E11.29 TYPE 2 DIABETES MELLITUS WITH DIABETIC MICROALBUMINURIA, WITHOUT LONG-TERM CURRENT USE OF INSULIN (HCC): Primary | ICD-10-CM

## 2025-07-21 DIAGNOSIS — R80.9 TYPE 2 DIABETES MELLITUS WITH DIABETIC MICROALBUMINURIA, WITHOUT LONG-TERM CURRENT USE OF INSULIN (HCC): Primary | ICD-10-CM

## 2025-07-21 PROCEDURE — 99401 PREV MED CNSL INDIV APPRX 15: CPT | Performed by: INTERNAL MEDICINE

## 2025-07-21 NOTE — PROGRESS NOTES
Patient Consult Note - Follow Up Visit  Primary care physician: Manjeet Moyer III, M.D.    Reason for consult: Management of Uncontrolled Type 2 Diabetes    Time IN:  2:26 pm  Time OUT:  2:36 pm    HPI:  Giuseppe Swift is a 49 y.o. old patient who comes in today for evaluation of above stated problem.    Most Recent HbA1c:   Lab Results   Component Value Date/Time    HBA1C 6.2 (A) 06/23/2025 02:05 PM          Reliable and Exact Care Diabetes Score    Displays the Diabetes REC Score.  A patient gets 1 point for each measure for a maximum of 7 points   0  Measures Not Met          Current Diabetes Regimen:  GLP1a/GIP:  Mounjaro 5mg SQ once weekly    Previously attempted medications:  Rybelsus - stopped by previous PCP outside of [facility]  Metformin - GI distress  Glimepiride - stopped on own accord    Discussed FBG goal of , 2hrPP <180, and A1c <7.0%.  Before Breakfast:  115, 90, 91, 89, 108, 104, 86, 91, 81, 156, 98, 100  Before Lunch:  Before Dinner:  Before Bedtime:  Other times:  Hypoglycemia:  None    Lifestyle:  Has had one treatment of Tepezza (once every 3 weeks), anticipating 5 treatments   Notes tingling and pinpoint feeling after first dose of Tepezza  Weight lifting more 2-3x/wk at home  Starting in August, will attempt 90 day program at home and look for a gym during this time (not much for cardio)    Pt states appetite suppression; tries to eat one solid meal/day  When pt is off work, tends to eat a little more than if he were at work    Hydration: adequate    Previous notes:   6/23/2025  No appreciable changes to nutrition habits, other than timing of first meal of the day.  Continues to integrate good amount of protein and fruit throughout the day.  Has got back to consistent exercise with plans to start his normal 90 day rotation for strength training.    Previous visit notes:  1/2025  Has been working to incorporate more protein to nutrition habits and minimize carbs.  He and wife  "unfortunately suffered a GI illness that caused good amount of nausea, vomiting, and diarrhea.  Was eating bland, carb heavy foods during that time, but is now back to normal eating habits.  Noted appetite suppression with Mounjaro.  Cleared to return to resistance training Feb 1st.    12/2024  Current Exercise - None at this time as directed by cardiology  Exercise Goal - Would highly benefit from at least 150-180 min/week moderate intensity exercise.  Previous hx of resistance training on regular basis along with cardio work.  With recent CPK elevations and associated myalgias, has been instructed to holding off on exercise until levels return to baseline.     Nutrition -   Has good understanding of nutrition associated with diabetes and cardiovascular health.  Patient states that he does not necessarily \"cut anything out\" as he look for good mix of macronutrients.  He states that he and wife focus on one true meal per day, but admits that he can get into a good deal of snacking throughout the day.  Does admit to chips, crackers, good deal of fruits, yogurts as part of snacking.  Portions seem to be under good control.    Preventative Management  BP regimen (ACEi/ARB): Losartan 50mg QD  BP <140/90: Yes  Statin:  Repatha 140mg SQ every 14 days  LDL <100: No, labs ordered today  Lab Results   Component Value Date/Time    CHOLSTRLTOT 121 04/10/2025 02:11 PM    LDL 51 04/10/2025 02:11 PM    HDL 33 (A) 04/10/2025 02:11 PM    TRIGLYCERIDE 183 (H) 04/10/2025 02:11 PM       Last Microalbumin/Cr:  Lab Results   Component Value Date/Time    MALBCRT 404 (H) 03/26/2025 10:54 AM    MICROALBUR 54.5 03/26/2025 10:54 AM     Last A1c:  Lab Results   Component Value Date/Time    HBA1C 6.2 (A) 06/23/2025 02:05 PM      Monofilament exam: will need to conduct at next visit      REC DM Score: 0  Care gaps addressed:   N/A  Care gaps updated in Health Maintenance    ROS:  Constitutional: No weight loss  Cardiac: No palpitations or " racing heart  Resp: No shortness of breath  Neuro: No numbness or tinging in feet  Endo: No heat or cold intolerance, no polyuria or polydipsia  All other systems were reviewed and were negative.    Past Medical History:  Patient Active Problem List    Diagnosis Date Noted    Thyroid orbitopathy 06/02/2025    Myopia of both eyes 06/02/2025    Elevated hemoglobin (HCC) 03/31/2025    History of non-ST elevation myocardial infarction (NSTEMI) 03/03/2025    Stented coronary artery 03/03/2025    Hepatic steatosis 03/03/2025    Elevated CPK 03/03/2025    Class 1 obesity with serious comorbidity and body mass index (BMI) of 33.0 to 33.9 in adult, unspecified obesity type 03/03/2025    Statin intolerance 03/03/2025    Myalgia due to statin 03/03/2025    Testicular hypofunction 03/03/2025    Long-term current use of testosterone replacement therapy 03/03/2025    Seasonal allergic rhinitis due to pollen 05/07/2024    Wellness examination 10/24/2023    Chronic fatigue 09/28/2021    Stress 01/25/2021    Acquired hypothyroidism 09/11/2019    Type 2 diabetes mellitus with diabetic microalbuminuria, without long-term current use of insulin (HCC) 09/11/2019    Obesity (BMI 30-39.9) 08/16/2019    GERD (gastroesophageal reflux disease) 08/16/2019    Coronary artery disease involving native coronary artery of native heart without angina pectoris 07/11/2019    Pure hypercholesterolemia 07/11/2019    Low HDL (under 40) 07/11/2019    Primary hypertension 07/11/2019       Past Surgical History:  Past Surgical History:   Procedure Laterality Date    VA UPPER GI ENDOSCOPY,DIAGNOSIS  2/22/2021    Procedure: GASTROSCOPY;  Surgeon: Jcarlos Thomas M.D.;  Location: [facility];  Service: EUS    EGD W/ENDOSCOPIC ULTRASOUND  2/22/2021    Procedure: EGD, WITH ENDOSCOPIC US - UPPER RADIAL LINEAR;  Surgeon: Jcarlos Thomas M.D.;  Location: SURGERY [facility];  Service: EUS    VASECTOMY  2007    FUSION, SPINE, LUMBAR, PLIF  2004    L4-S1        Allergies:  Iodine, Lisinopril, Pineapple, Shellfish allergy, Biofreeze, Cat hair extract, Latex, Mushroom extract complex, and Atorvastatin    Social History:  Social History     Socioeconomic History    Marital status:      Spouse name: Not on file    Number of children: Not on file    Years of education: Not on file    Highest education level: Associate degree: occupational, technical, or vocational program   Occupational History    Not on file   Tobacco Use    Smoking status: Never    Smokeless tobacco: Never   Vaping Use    Vaping status: Never Used   Substance and Sexual Activity    Alcohol use: Not Currently     Comment: occasional, rare    Drug use: No    Sexual activity: Yes     Partners: Female     Birth control/protection: Surgical   Other Topics Concern    Not on file   Social History Narrative    Works at [employer]     Social Drivers of Health     Financial Resource Strain: Low Risk  (1/25/2021)    Overall Financial Resource Strain (CARDIA)     Difficulty of Paying Living Expenses: Not hard at all   Food Insecurity: No Food Insecurity (1/25/2021)    Hunger Vital Sign     Worried About Running Out of Food in the Last Year: Never true     Ran Out of Food in the Last Year: Never true   Transportation Needs: No Transportation Needs (1/25/2021)    PRAPARE - Transportation     Lack of Transportation (Medical): No     Lack of Transportation (Non-Medical): No   Physical Activity: Sufficiently Active (1/25/2021)    Exercise Vital Sign     Days of Exercise per Week: 4 days     Minutes of Exercise per Session: 120 min   Stress: Stress Concern Present (1/25/2021)    [employer] - Occupational Stress Questionnaire     Feeling of Stress : To some extent   Social Connections: Moderately Integrated (1/25/2021)    Social Connection and Isolation Panel [NHANES]     Frequency of Communication with Friends and Family: More than three times a week     Frequency of Social Gatherings with  Friends and Family: Once a week     Attends Buddhism Services: More than 4 times per year     Active Member of Clubs or Organizations: Yes     Attends Club or Organization Meetings: More than 4 times per year     Marital Status:    Intimate Partner Violence: Not on file   Housing Stability: Unknown (1/25/2021)    Housing Stability Vital Sign     Unable to Pay for Housing in the Last Year: No     Number of Places Lived in the Last Year: Not on file     Unstable Housing in the Last Year: No       Family History:  Family History   Problem Relation Age of Onset    Diabetes Mother     Glasses Mother     No Known Problems Father     Diabetes Sister         T2    Accidental Death Brother        Medications:    Current Outpatient Medications:     testosterone cypionate (DEPO-TESTOSTERONE) 200 MG/ML injection, INJECT 1 ML INTRAMUSCULARLY INTO THE SHOULDER, THIGH OR BUTTOCKS ONCE EVERY 2 WEEKS.  For 90 days, Disp: 6 mL, Rfl: 0    Tirzepatide (MOUNJARO) 5 MG/0.5ML Solution Auto-injector, Inject 0.5 mL under the skin every 7 days., Disp: 2 mL, Rfl: 5    telmisartan (MICARDIS) 80 MG Tab, TAKE 1 TABLET BY MOUTH ONCE DAILY AT BEDTIME TO  LOWER  BLOOD  PRESSURE. (Patient not taking: Reported on 6/2/2025), Disp: 100 Tablet, Rfl: 3    calcium carbonate (LAURENCE-SELTZER HEARTBURN) 750 MG chewable tablet, Chew 3,000-3,750 mg every 2 hours as needed (heartburn)., Disp: , Rfl:     Multiple Vitamins-Minerals (MENS ONE DAILY PO), Take 1 Tablet by mouth every morning., Disp: , Rfl:     lansoprazole (PREVACID) 30 MG CAPSULE DELAYED RELEASE, Take 1 Capsule by mouth every day., Disp: 90 Capsule, Rfl: 2    Evolocumab (REPATHA SURECLICK) 140 MG/ML Solution Auto-injector SubQ injection pen, Inject 1 mL under the skin every 14 days., Disp: 2.1 Each, Rfl: 12    levothyroxine (SYNTHROID) 112 MCG Tab, Take 1 Tablet by mouth every morning on an empty stomach., Disp: 90 Tablet, Rfl: 3    nitroglycerin (NITROSTAT) 0.4 MG SL Tab, Place 1 Tablet  "under the tongue as needed for Chest Pain., Disp: 25 Tablet, Rfl: 11    Syringe/Needle, Disp, (SYRINGE 3CC/23GX1\") 23G X 1\" 3 ML Misc, Use with testosterone every 2 weeks, Disp: 6 Each, Rfl: 3    glucose blood strip, 1 Strip by Other route every day., Disp: 100 Strip, Rfl: 3    aspirin EC (ECOTRIN) 81 MG Tablet Delayed Response, Take 162 mg by mouth every morning. 2 tablets = 162 mg., Disp: , Rfl:     fexofenadine (ALLEGRA) 180 MG tablet, Take 180 mg by mouth every day., Disp: , Rfl:     Labs: Reviewed    Physical Examination:  Vital signs: There were no vitals taken for this visit. There is no height or weight on file to calculate BMI.  General: No apparent distress, cooperative  Eyes: No scleral icterus or discharge  ENMT: Normal on external inspection of nose, lips, normal thyroid exam  Neck: No abnormal masses on inspection  Resp: Normal effort, clear to auscultation bilaterally   CVS: Regular rate and rhythm, S1 S2 normal, no murmur   Extremities: No edema  Abdomen: abdominal obesity present  Neuro: Alert and oriented  Skin: No rash  Psych: Normal mood and affect, intact memory and able to make informed decisions    Assessment and Plan:  Maintain Mounjaro dosing at current regimen    Previous notes:   6/23/2025  Basic physiology of DMII was explained to patient as well as microvascular/macrovascular complications. The importance of increasing physical activity to improve diabetes control was discussed with the patient. Patient was also educated on changing diet and making better choices to help control blood sugar.    Patient tolerating current dosing of Mounjaro  Testing FBG throughout they day, most seem to be at goal at this time.  A1c conducted at last visit, well within goal at 6.2% at that time.    Primary purpose of today's visit was to assess possible impact of Tepezza therapy on blood glucose readings (warning of possibly hyperglycemia).  FBG all seem to be at goal and largely unchanged from prior to " therapy.  Patient comments that he continues to experience some fatigue, especially after first Tepezza dosing.  He is back to working out on a more frequent basis.      Will continue with current Mounjaro dosing for now, let patient know that if he is comfortable with dose increase to let clinic know.  Stressed importance of continued focus on nutrition habits, optimize protein intake, minimize carbs.  Continue to increase exercise.    Follow Up:  Two months to assess blood glucose after continuing Tepezza; A1C check    Thank you for allowing me to participate in the care of this patient.    Lilian Moses, Pharmacy Intern  Weston Manriquez, PharmD, [facility]  7/21/2025    CC:   Manjeet Moyer III, M.D.

## 2025-07-23 DIAGNOSIS — Z00.6 RESEARCH STUDY PATIENT: Primary | ICD-10-CM

## 2025-08-12 ENCOUNTER — HOSPITAL ENCOUNTER (OUTPATIENT)
Dept: LAB | Facility: MEDICAL CENTER | Age: 50
End: 2025-08-12
Attending: FAMILY MEDICINE
Payer: COMMERCIAL

## 2025-08-12 DIAGNOSIS — Z00.6 RESEARCH STUDY PATIENT: ICD-10-CM

## 2025-08-26 ENCOUNTER — OFFICE VISIT (OUTPATIENT)
Facility: MEDICAL CENTER | Age: 50
End: 2025-08-26
Attending: INTERNAL MEDICINE
Payer: COMMERCIAL

## 2025-08-26 VITALS
SYSTOLIC BLOOD PRESSURE: 124 MMHG | HEART RATE: 82 BPM | DIASTOLIC BLOOD PRESSURE: 70 MMHG | BODY MASS INDEX: 31.92 KG/M2 | HEIGHT: 71 IN | OXYGEN SATURATION: 94 % | RESPIRATION RATE: 16 BRPM | WEIGHT: 228 LBS

## 2025-08-26 DIAGNOSIS — R74.8 ELEVATED CK: ICD-10-CM

## 2025-08-26 DIAGNOSIS — I10 PRIMARY HYPERTENSION: ICD-10-CM

## 2025-08-26 DIAGNOSIS — Z79.890 LONG-TERM CURRENT USE OF TESTOSTERONE REPLACEMENT THERAPY: ICD-10-CM

## 2025-08-26 DIAGNOSIS — R80.9 TYPE 2 DIABETES MELLITUS WITH DIABETIC MICROALBUMINURIA, WITHOUT LONG-TERM CURRENT USE OF INSULIN (HCC): ICD-10-CM

## 2025-08-26 DIAGNOSIS — I25.2 OLD MYOCARDIAL INFARCT: ICD-10-CM

## 2025-08-26 DIAGNOSIS — E11.29 TYPE 2 DIABETES MELLITUS WITH DIABETIC MICROALBUMINURIA, WITHOUT LONG-TERM CURRENT USE OF INSULIN (HCC): ICD-10-CM

## 2025-08-26 DIAGNOSIS — E78.00 PURE HYPERCHOLESTEROLEMIA: ICD-10-CM

## 2025-08-26 DIAGNOSIS — Z95.5 STENTED CORONARY ARTERY: ICD-10-CM

## 2025-08-26 DIAGNOSIS — I25.9 ISCHEMIC HEART DISEASE DUE TO CORONARY ARTERY OBSTRUCTION (HCC): Primary | ICD-10-CM

## 2025-08-26 DIAGNOSIS — R53.82 CHRONIC FATIGUE: ICD-10-CM

## 2025-08-26 DIAGNOSIS — Z88.8 ALLERGY TO IODINE: ICD-10-CM

## 2025-08-26 DIAGNOSIS — D75.1 POLYCYTHEMIA: ICD-10-CM

## 2025-08-26 DIAGNOSIS — I24.0 ISCHEMIC HEART DISEASE DUE TO CORONARY ARTERY OBSTRUCTION (HCC): Primary | ICD-10-CM

## 2025-08-26 DIAGNOSIS — Z78.9 STATIN INTOLERANCE: ICD-10-CM

## 2025-08-26 LAB
APOB+LDLR+PCSK9 GENE MUT ANL BLD/T: NOT DETECTED
BRCA1+BRCA2 DEL+DUP + FULL MUT ANL BLD/T: NOT DETECTED
MLH1+MSH2+MSH6+PMS2 GN DEL+DUP+FUL M: NOT DETECTED

## 2025-08-26 PROCEDURE — 99214 OFFICE O/P EST MOD 30 MIN: CPT | Performed by: INTERNAL MEDICINE

## 2025-08-26 PROCEDURE — 99212 OFFICE O/P EST SF 10 MIN: CPT | Performed by: INTERNAL MEDICINE

## 2025-08-26 PROCEDURE — 3078F DIAST BP <80 MM HG: CPT | Performed by: INTERNAL MEDICINE

## 2025-08-26 PROCEDURE — 3074F SYST BP LT 130 MM HG: CPT | Performed by: INTERNAL MEDICINE

## 2025-08-26 ASSESSMENT — FIBROSIS 4 INDEX: FIB4 SCORE: 1.18

## 2025-09-02 ENCOUNTER — APPOINTMENT (OUTPATIENT)
Dept: OPHTHALMOLOGY | Facility: MEDICAL CENTER | Age: 50
End: 2025-09-02
Payer: COMMERCIAL

## (undated) DEVICE — SPONGE GAUZE NON-STERILE 4X4 - (2000/CA 10PK/CA)

## (undated) DEVICE — SYRINGE SAFETY 10 ML 18 GA X 1 1/2 BLUNT LL (100/BX 4BX/CA)

## (undated) DEVICE — CANISTER SUCTION RIGID RED 1500CC (40EA/CA)

## (undated) DEVICE — NEPTUNE 4 PORT MANIFOLD - (20/PK)

## (undated) DEVICE — KIT  I.V. START (100EA/CA)

## (undated) DEVICE — TUBE E-T HI-LO CUFF 7.5MM (10EA/PK)

## (undated) DEVICE — SET EXTENSION WITH 2 PORTS (48EA/CA) ***PART #2C8610 IS A SUBSTITUTE*****

## (undated) DEVICE — BLOCK BITE ENDOSCOPIC 2809 - (100/BX) INTERMEDIATE

## (undated) DEVICE — CATHETER IV SAFETY 20 GA X 1-1/4 (50/BX)

## (undated) DEVICE — SYRINGE 12 CC LUER TIP - (80/BX) OBSOLETE ITEM

## (undated) DEVICE — FORCEP RADIAL JAW 4 STANDARD CAPACITY W/NEEDLE 240CM (40EA/BX)

## (undated) DEVICE — TUBE E-T HI-LO CUFF 6.5MM (10EA/BX)

## (undated) DEVICE — LACTATED RINGERS INJ 1000 ML - (14EA/CA 60CA/PF)

## (undated) DEVICE — SYRINGE DISP. 60 CC LL - (30/BX, 12BX/CA)**WHEN THESE ARE GONE ORDER #500206**

## (undated) DEVICE — TUBING CLEARLINK DUO-VENT - C-FLO (48EA/CA)

## (undated) DEVICE — GOWN SURGEONS LARGE - (32/CA)

## (undated) DEVICE — MASK ANESTHESIA ADULT  - (100/CA)

## (undated) DEVICE — TUBE CONNECTING SUCTION - CLEAR PLASTIC STERILE 72 IN (50EA/CA)

## (undated) DEVICE — TUBE E-T HI-LO CUFF 8.5MM (10EA/PK)

## (undated) DEVICE — SYRINGE SAFETY 5 ML 18 GA X 1-1/2 BLUNT LL (100/BX 4BX/CA)

## (undated) DEVICE — GLOVE, LITE (PAIR)

## (undated) DEVICE — ELECTRODE DUAL RETURN W/ CORD - (50/PK)

## (undated) DEVICE — KIT ANESTHESIA W/CIRCUIT & 3/LT BAG W/FILTER (20EA/CA)

## (undated) DEVICE — TUBE E-T HI-LO CUFF 8.0MM (10EA/PK)

## (undated) DEVICE — KIT CUSTOM PROCEDURE SINGLE FOR ENDO  (15/CA)

## (undated) DEVICE — MASK WITH FACE SHIELD (25/BX 4BX/CA)

## (undated) DEVICE — SENSOR SPO2 ADULT LNCS ADTX (20/BX) ORDER ITEM #19593

## (undated) DEVICE — ELECTRODE 850 FOAM ADHESIVE - HYDROGEL RADIOTRNSPRNT (50/PK)

## (undated) DEVICE — SYRINGE SAFETY 3 ML 18 GA X 1 1/2 BLUNT LL (100/BX 8BX/CA)

## (undated) DEVICE — WATER IRRIGATION STERILE 1000ML (12EA/CA)

## (undated) DEVICE — TUBE E-T HI-LO CUFF 7.0MM (10EA/PK)

## (undated) DEVICE — TUBE SUCTION YANKAUER  1/4 X 6FT (20EA/CA)"

## (undated) DEVICE — BITE BLOCK ADULT 60FR (100EA/CA)